# Patient Record
Sex: MALE | Race: WHITE | NOT HISPANIC OR LATINO | Employment: OTHER | URBAN - METROPOLITAN AREA
[De-identification: names, ages, dates, MRNs, and addresses within clinical notes are randomized per-mention and may not be internally consistent; named-entity substitution may affect disease eponyms.]

---

## 2017-01-04 RX ORDER — ATORVASTATIN CALCIUM 10 MG/1
TABLET, FILM COATED ORAL
Qty: 90 TABLET | Refills: 1 | Status: SHIPPED | OUTPATIENT
Start: 2017-01-04 | End: 2017-07-11 | Stop reason: SDUPTHER

## 2017-01-27 RX ORDER — NEBIVOLOL HYDROCHLORIDE 10 MG/1
TABLET ORAL
Qty: 90 TABLET | Refills: 2 | Status: SHIPPED | OUTPATIENT
Start: 2017-01-27 | End: 2017-12-20 | Stop reason: SDUPTHER

## 2017-03-08 PROBLEM — K21.9 GERD (GASTROESOPHAGEAL REFLUX DISEASE): Status: ACTIVE | Noted: 2017-03-08

## 2017-03-08 PROBLEM — I10 HYPERTENSION: Status: ACTIVE | Noted: 2017-03-08

## 2017-03-08 PROBLEM — E78.5 HYPERLIPIDEMIA: Status: ACTIVE | Noted: 2017-03-08

## 2017-03-08 PROBLEM — J45.909 ASTHMA: Status: ACTIVE | Noted: 2017-03-08

## 2017-03-08 PROBLEM — I73.9 PAD (PERIPHERAL ARTERY DISEASE): Status: ACTIVE | Noted: 2017-03-08

## 2017-03-08 PROBLEM — I25.10 CAD (CORONARY ARTERY DISEASE): Status: ACTIVE | Noted: 2017-03-08

## 2017-05-17 ENCOUNTER — OFFICE VISIT (OUTPATIENT)
Dept: CARDIOLOGY | Facility: CLINIC | Age: 72
End: 2017-05-17

## 2017-05-17 VITALS
HEIGHT: 67 IN | DIASTOLIC BLOOD PRESSURE: 82 MMHG | HEART RATE: 85 BPM | WEIGHT: 183.4 LBS | SYSTOLIC BLOOD PRESSURE: 147 MMHG | BODY MASS INDEX: 28.79 KG/M2

## 2017-05-17 DIAGNOSIS — I25.10 CORONARY ARTERY DISEASE INVOLVING NATIVE CORONARY ARTERY OF NATIVE HEART WITHOUT ANGINA PECTORIS: Primary | ICD-10-CM

## 2017-05-17 DIAGNOSIS — E78.5 DYSLIPIDEMIA: ICD-10-CM

## 2017-05-17 DIAGNOSIS — I73.9 PAD (PERIPHERAL ARTERY DISEASE) (HCC): ICD-10-CM

## 2017-05-17 DIAGNOSIS — I10 ESSENTIAL HYPERTENSION: ICD-10-CM

## 2017-05-17 PROCEDURE — 99213 OFFICE O/P EST LOW 20 MIN: CPT | Performed by: NURSE PRACTITIONER

## 2017-05-17 RX ORDER — ASPIRIN 325 MG
325 TABLET ORAL DAILY
COMMUNITY

## 2017-05-25 RX ORDER — CHLORTHALIDONE 25 MG/1
TABLET ORAL
Qty: 45 TABLET | Refills: 3 | Status: SHIPPED | OUTPATIENT
Start: 2017-05-25 | End: 2018-05-24 | Stop reason: SDUPTHER

## 2017-06-21 RX ORDER — LISINOPRIL 10 MG/1
TABLET ORAL
Qty: 90 TABLET | Refills: 2 | Status: SHIPPED | OUTPATIENT
Start: 2017-06-21 | End: 2018-02-24 | Stop reason: SDUPTHER

## 2017-07-12 RX ORDER — ATORVASTATIN CALCIUM 10 MG/1
TABLET, FILM COATED ORAL
Qty: 90 TABLET | Refills: 3 | Status: SHIPPED | OUTPATIENT
Start: 2017-07-12 | End: 2018-06-24 | Stop reason: SDUPTHER

## 2017-12-20 RX ORDER — NEBIVOLOL HYDROCHLORIDE 10 MG/1
TABLET ORAL
Qty: 90 TABLET | Refills: 2 | Status: SHIPPED | OUTPATIENT
Start: 2017-12-20 | End: 2018-09-14 | Stop reason: SDUPTHER

## 2018-01-11 ENCOUNTER — TELEPHONE (OUTPATIENT)
Dept: CARDIOLOGY | Facility: CLINIC | Age: 73
End: 2018-01-11

## 2018-01-11 NOTE — TELEPHONE ENCOUNTER
Wife called and said they think patient has a DVT, tried to call back and advise to call PCP, Dr. Blas is not in office today.  Number she left is wrong # and number on chart goes to voicemail.

## 2018-02-26 RX ORDER — LISINOPRIL 10 MG/1
TABLET ORAL
Qty: 90 TABLET | Refills: 2 | Status: SHIPPED | OUTPATIENT
Start: 2018-02-26 | End: 2018-11-05 | Stop reason: SDUPTHER

## 2018-05-21 NOTE — PROGRESS NOTES
Subjective:     Encounter Date:05/23/2018    Primary Care Physician: Simon Easley MD      Patient ID: Uzair Jacob is a 72 y.o. male.    Chief Complaint:Coronary Artery Disease and PAD (peripheral artery disease    PROBLEM LIST:  1. Coronary artery disease:  a. Catheterization, March 2012: 70% to 80% mid-LAD with ‘borderline” FFR. 3.5 x 15 Xience. EF normal.   2. Peripheral arterial disease:  a. Asymptomatic abnormal MADISON.  b. Right 0.7, left NC, April 2012.  3. Hypertension.  4. Hyperlipidemia.  5. DVT, right lower extremity.  1/18.  6. Asthma.  7. Gastroesophageal reflux disease.  8. Tonsillectomy.  9. Sinus polyp extraction.      No Known Allergies      Current Outpatient Prescriptions:   •  aspirin 325 MG tablet, Take 325 mg by mouth Daily., Disp: , Rfl:   •  atorvastatin (LIPITOR) 10 MG tablet, TAKE 1 TABLET AT BEDTIME, Disp: 90 tablet, Rfl: 3  •  BYSTOLIC 10 MG tablet, TAKE 1 TABLET BY MOUTH EVERY DAY, Disp: 90 tablet, Rfl: 2  •  chlorthalidone (HYGROTON) 25 MG tablet, TAKE 1/2 TABLET DAILY., Disp: 45 tablet, Rfl: 3  •  fluticasone-salmeterol (ADVAIR) 100-50 MCG/DOSE DISKUS, Inhale 2 (Two) Times a Day., Disp: , Rfl:   •  lisinopril (PRINIVIL,ZESTRIL) 10 MG tablet, TAKE 1 TABLET BY MOUTH EVERY DAY, Disp: 90 tablet, Rfl: 2  •  warfarin (COUMADIN) 5 MG tablet, Take 5 mg by mouth Daily. 5mg, 5mg 7,5mg cycle, Disp: , Rfl:         History of Present Illness    Patient returns today for annual follow-up of coronary disease and peripheral till disease.  Since her last visit he has no cardiovascular symptoms.  Did have a DVT of his right femoral vein diagnosed in January of this year per the patient, that occurred in the setting of a prolonged car ride.  He is currently on warfarin for this.  Symptoms of his right leg have resolved.  He still remains active physically, and denies chest pain and shortness of breath palpitations presyncope or syncope.  He has tried to self decrease his antihypertensives but  "notes that his heart starts to race and he goes back on his previous doses.    The following portions of the patient's history were reviewed and updated as appropriate: allergies, current medications, past family history, past medical history, past social history, past surgical history and problem list.    Social History   Substance Use Topics   • Smoking status: Never Smoker   • Smokeless tobacco: Never Used   • Alcohol use Yes      Comment: socially         Review of Systems   Constitution: Negative for fever, weakness and malaise/fatigue.   HENT: Negative for nosebleeds.    Eyes: Negative for redness and visual disturbance.   Cardiovascular: Positive for leg swelling. Negative for orthopnea, palpitations and paroxysmal nocturnal dyspnea.   Respiratory: Positive for cough. Negative for snoring, sputum production and wheezing.    Hematologic/Lymphatic: Negative for bleeding problem.   Skin: Negative for flushing, itching and rash.   Musculoskeletal: Negative for falls, joint pain and muscle cramps.   Gastrointestinal: Negative for abdominal pain, diarrhea, heartburn, nausea and vomiting.   Genitourinary: Negative for hematuria.   Neurological: Negative for excessive daytime sleepiness, dizziness, headaches and tremors.   Psychiatric/Behavioral: Negative for substance abuse. The patient is not nervous/anxious.           Objective:    height is 170.2 cm (67\") and weight is 83.5 kg (184 lb). His blood pressure is 126/72 and his pulse is 69.         Physical Exam   Constitutional: He is oriented to person, place, and time. He appears well-developed and well-nourished.   HENT:   Mouth/Throat: Oropharynx is clear and moist.   Neck: No JVD present. Carotid bruit is not present. No thyromegaly present.   Cardiovascular: Regular rhythm, S1 normal, S2 normal, normal heart sounds and intact distal pulses.  Exam reveals no gallop, no S3 and no S4.    No murmur heard.  Pulses:       Carotid pulses are 2+ on the right side, and " 2+ on the left side.       Radial pulses are 2+ on the right side, and 2+ on the left side.   Pulmonary/Chest: Breath sounds normal.   Abdominal: Soft. Bowel sounds are normal. He exhibits no mass. There is no tenderness.   Musculoskeletal: He exhibits no edema.   Neurological: He is alert and oriented to person, place, and time.   Skin: Skin is warm and dry. No rash noted.       Procedures          Assessment:   Assessment/Plan      Uzair was seen today for coronary artery disease and pad (peripheral artery disease.    Diagnoses and all orders for this visit:    Coronary artery disease involving native coronary artery of native heart without angina pectoris    PAD (peripheral artery disease)    Essential hypertension    Pure hypercholesterolemia      1.  Coronary artery disease stable no angina  2.  Peripheral artery disease, asymptomatic  3.  Hypertension controlled  4.  Dyslipidemia controlled  5.  DVT in the setting of prolonged car ride, currently on warfarin, being managed per primary physician.     Recommendations: Discussed the duration of warfarin which we will defer to primary care physician, as we are unclear whether he's had workup with the results of his risk for recurrence (factor V Ley[place] at all) nonetheless he is doing well from a cardiovascular standpoint, has no evidence of active ischemia or claudication and therefore continue current medical therapy.  Revisit annually or when necessary symptom change.    Vic Blas MD      Dictated utilizing Dragon dictation

## 2018-05-23 ENCOUNTER — OFFICE VISIT (OUTPATIENT)
Dept: CARDIOLOGY | Facility: CLINIC | Age: 73
End: 2018-05-23

## 2018-05-23 VITALS
DIASTOLIC BLOOD PRESSURE: 72 MMHG | SYSTOLIC BLOOD PRESSURE: 126 MMHG | WEIGHT: 184 LBS | HEIGHT: 67 IN | HEART RATE: 69 BPM | BODY MASS INDEX: 28.88 KG/M2

## 2018-05-23 DIAGNOSIS — E78.00 PURE HYPERCHOLESTEROLEMIA: ICD-10-CM

## 2018-05-23 DIAGNOSIS — I25.10 CORONARY ARTERY DISEASE INVOLVING NATIVE CORONARY ARTERY OF NATIVE HEART WITHOUT ANGINA PECTORIS: Primary | ICD-10-CM

## 2018-05-23 DIAGNOSIS — I10 ESSENTIAL HYPERTENSION: ICD-10-CM

## 2018-05-23 DIAGNOSIS — I73.9 PAD (PERIPHERAL ARTERY DISEASE) (HCC): ICD-10-CM

## 2018-05-23 PROCEDURE — 99214 OFFICE O/P EST MOD 30 MIN: CPT | Performed by: INTERNAL MEDICINE

## 2018-05-23 RX ORDER — WARFARIN SODIUM 5 MG/1
5 TABLET ORAL
COMMUNITY
End: 2019-05-01

## 2018-05-24 RX ORDER — CHLORTHALIDONE 25 MG/1
TABLET ORAL
Qty: 45 TABLET | Refills: 3 | Status: SHIPPED | OUTPATIENT
Start: 2018-05-24 | End: 2019-04-28 | Stop reason: SDUPTHER

## 2018-06-25 RX ORDER — ATORVASTATIN CALCIUM 10 MG/1
TABLET, FILM COATED ORAL
Qty: 90 TABLET | Refills: 3 | Status: SHIPPED | OUTPATIENT
Start: 2018-06-25 | End: 2018-12-17 | Stop reason: SDUPTHER

## 2018-09-17 RX ORDER — NEBIVOLOL HYDROCHLORIDE 10 MG/1
TABLET ORAL
Qty: 90 TABLET | Refills: 2 | Status: SHIPPED | OUTPATIENT
Start: 2018-09-17 | End: 2018-11-05 | Stop reason: SDUPTHER

## 2018-11-05 RX ORDER — LISINOPRIL 10 MG/1
10 TABLET ORAL DAILY
Qty: 90 TABLET | Refills: 3 | Status: SHIPPED | OUTPATIENT
Start: 2018-11-05 | End: 2019-10-16 | Stop reason: SDUPTHER

## 2018-11-05 RX ORDER — NEBIVOLOL 10 MG/1
10 TABLET ORAL DAILY
Qty: 90 TABLET | Refills: 3 | Status: SHIPPED | OUTPATIENT
Start: 2018-11-05 | End: 2019-12-06 | Stop reason: SDUPTHER

## 2018-12-17 RX ORDER — ATORVASTATIN CALCIUM 10 MG/1
10 TABLET, FILM COATED ORAL
Qty: 30 TABLET | Refills: 3 | Status: SHIPPED | OUTPATIENT
Start: 2018-12-17 | End: 2019-05-01 | Stop reason: SDUPTHER

## 2019-03-04 DIAGNOSIS — Z12.11 SCREENING FOR COLON CANCER: Primary | ICD-10-CM

## 2019-03-12 ENCOUNTER — OUTSIDE FACILITY SERVICE (OUTPATIENT)
Dept: GASTROENTEROLOGY | Facility: CLINIC | Age: 74
End: 2019-03-12

## 2019-03-12 ENCOUNTER — LAB REQUISITION (OUTPATIENT)
Dept: LAB | Facility: HOSPITAL | Age: 74
End: 2019-03-12

## 2019-03-12 DIAGNOSIS — Z12.11 ENCOUNTER FOR SCREENING FOR MALIGNANT NEOPLASM OF COLON: ICD-10-CM

## 2019-03-12 DIAGNOSIS — Z86.010 HISTORY OF COLONIC POLYPS: ICD-10-CM

## 2019-03-12 PROCEDURE — 45385 COLONOSCOPY W/LESION REMOVAL: CPT | Performed by: INTERNAL MEDICINE

## 2019-03-12 PROCEDURE — 88305 TISSUE EXAM BY PATHOLOGIST: CPT | Performed by: INTERNAL MEDICINE

## 2019-03-12 PROCEDURE — G0500 MOD SEDAT ENDO SERVICE >5YRS: HCPCS | Performed by: INTERNAL MEDICINE

## 2019-03-13 LAB
CYTO UR: NORMAL
LAB AP CASE REPORT: NORMAL
LAB AP CLINICAL INFORMATION: NORMAL
PATH REPORT.FINAL DX SPEC: NORMAL
PATH REPORT.GROSS SPEC: NORMAL

## 2019-03-26 RX ORDER — ATORVASTATIN CALCIUM 10 MG/1
10 TABLET, FILM COATED ORAL
Qty: 30 TABLET | Refills: 1 | OUTPATIENT
Start: 2019-03-26

## 2019-04-18 PROBLEM — J44.89 CHRONIC ASTHMATIC BRONCHITIS: Status: ACTIVE | Noted: 2019-04-18

## 2019-04-18 PROBLEM — I82.401 DEEP VEIN THROMBOSIS (DVT) OF RIGHT LOWER EXTREMITY: Status: ACTIVE | Noted: 2019-04-18

## 2019-04-18 PROBLEM — J44.9 CHRONIC ASTHMATIC BRONCHITIS (HCC): Status: ACTIVE | Noted: 2019-04-18

## 2019-04-18 PROBLEM — Z78.9 ALCOHOL USE: Status: ACTIVE | Noted: 2019-04-18

## 2019-04-18 PROBLEM — E78.00 HYPERCHOLESTEROLEMIA: Status: ACTIVE | Noted: 2019-04-18

## 2019-04-18 PROBLEM — R60.0 EDEMA OF RIGHT LOWER EXTREMITY: Status: ACTIVE | Noted: 2019-04-18

## 2019-04-18 PROBLEM — Z00.00 ANNUAL PHYSICAL EXAM: Status: ACTIVE | Noted: 2019-04-18

## 2019-04-18 PROBLEM — R00.1 SINUS BRADYCARDIA, PERSISTENT: Status: ACTIVE | Noted: 2019-04-18

## 2019-04-18 PROBLEM — I20.9 ANGINA PECTORIS: Status: ACTIVE | Noted: 2019-04-18

## 2019-04-18 PROBLEM — J45.909 ACUTE ASTHMATIC BRONCHITIS: Status: ACTIVE | Noted: 2019-04-18

## 2019-04-18 PROBLEM — J30.2 SEASONAL ALLERGIC RHINITIS: Status: ACTIVE | Noted: 2019-04-18

## 2019-04-18 PROBLEM — I82.411 ACUTE DEEP VEIN THROMBOSIS (DVT) OF FEMORAL VEIN OF RIGHT LOWER EXTREMITY (HCC): Status: ACTIVE | Noted: 2019-04-18

## 2019-04-18 PROBLEM — I73.9 PERIPHERAL VASCULAR DISEASE: Status: ACTIVE | Noted: 2019-04-18

## 2019-04-18 PROBLEM — Z01.818 PREOPERATIVE EXAMINATION: Status: ACTIVE | Noted: 2019-04-18

## 2019-04-22 RX ORDER — ATORVASTATIN CALCIUM 10 MG/1
10 TABLET, FILM COATED ORAL
Qty: 30 TABLET | Refills: 3 | OUTPATIENT
Start: 2019-04-22

## 2019-04-22 RX ORDER — ALBUTEROL SULFATE 90 UG/1
2 AEROSOL, METERED RESPIRATORY (INHALATION) EVERY 4 HOURS PRN
Qty: 1 INHALER | Refills: 5 | Status: SHIPPED | OUTPATIENT
Start: 2019-04-22 | End: 2019-12-09 | Stop reason: SDUPTHER

## 2019-04-22 RX ORDER — ALBUTEROL SULFATE 90 UG/1
2 AEROSOL, METERED RESPIRATORY (INHALATION) EVERY 4 HOURS PRN
COMMUNITY
End: 2019-04-22 | Stop reason: SDUPTHER

## 2019-04-29 RX ORDER — CHLORTHALIDONE 25 MG/1
TABLET ORAL
Qty: 45 TABLET | Refills: 3 | Status: SHIPPED | OUTPATIENT
Start: 2019-04-29 | End: 2020-03-03

## 2019-05-01 ENCOUNTER — OFFICE VISIT (OUTPATIENT)
Dept: INTERNAL MEDICINE | Facility: CLINIC | Age: 74
End: 2019-05-01

## 2019-05-01 ENCOUNTER — LAB REQUISITION (OUTPATIENT)
Dept: LAB | Facility: HOSPITAL | Age: 74
End: 2019-05-01

## 2019-05-01 VITALS
SYSTOLIC BLOOD PRESSURE: 122 MMHG | HEIGHT: 67 IN | HEART RATE: 88 BPM | WEIGHT: 177 LBS | DIASTOLIC BLOOD PRESSURE: 66 MMHG | BODY MASS INDEX: 27.78 KG/M2

## 2019-05-01 DIAGNOSIS — I87.009 POSTPHLEBITIC SYNDROME: ICD-10-CM

## 2019-05-01 DIAGNOSIS — J45.40 MODERATE PERSISTENT ASTHMA WITHOUT COMPLICATION: ICD-10-CM

## 2019-05-01 DIAGNOSIS — I10 ESSENTIAL HYPERTENSION: ICD-10-CM

## 2019-05-01 DIAGNOSIS — Z00.00 ROUTINE GENERAL MEDICAL EXAMINATION AT A HEALTH CARE FACILITY: ICD-10-CM

## 2019-05-01 DIAGNOSIS — E78.2 MIXED HYPERLIPIDEMIA: Primary | ICD-10-CM

## 2019-05-01 DIAGNOSIS — I25.10 CORONARY ARTERY DISEASE INVOLVING NATIVE CORONARY ARTERY OF NATIVE HEART WITHOUT ANGINA PECTORIS: ICD-10-CM

## 2019-05-01 DIAGNOSIS — J30.89 SEASONAL ALLERGIC RHINITIS DUE TO OTHER ALLERGIC TRIGGER: ICD-10-CM

## 2019-05-01 PROBLEM — I82.401 DEEP VEIN THROMBOSIS (DVT) OF RIGHT LOWER EXTREMITY: Status: RESOLVED | Noted: 2019-04-18 | Resolved: 2019-05-01

## 2019-05-01 PROCEDURE — 36415 COLL VENOUS BLD VENIPUNCTURE: CPT

## 2019-05-01 PROCEDURE — 99214 OFFICE O/P EST MOD 30 MIN: CPT | Performed by: INTERNAL MEDICINE

## 2019-05-01 RX ORDER — OMEPRAZOLE 20 MG/1
20 CAPSULE, DELAYED RELEASE ORAL DAILY
COMMUNITY

## 2019-05-01 RX ORDER — PREDNISONE 10 MG/1
10 TABLET ORAL DAILY
Qty: 14 TABLET | Refills: 0 | Status: SHIPPED | OUTPATIENT
Start: 2019-05-01 | End: 2019-05-29

## 2019-05-01 RX ORDER — FEXOFENADINE HCL 180 MG/1
180 TABLET ORAL DAILY PRN
COMMUNITY
End: 2021-05-12

## 2019-05-01 RX ORDER — TRIAMCINOLONE ACETONIDE 55 UG/1
2 SPRAY, METERED NASAL 2 TIMES DAILY
COMMUNITY

## 2019-05-01 NOTE — PROGRESS NOTES
[facility]     Uzair Jacob  1945   [id number]      Patient Care Team:  Simon Easley MD as PCP - General    Chief Complaint::   Chief Complaint   Patient presents with   • Allergies     productive green cough   • Hypertension     He is fasting   • Hyperlipidemia        HPI  Mr. Jacob is now 73.  He comes in for follow-up of his hypertension, hyperlipidemia, allergy/asthma, and coronary artery disease.  He sees cardiology annually for his heart disease.  His only complaint today is increasing allergy symptoms with postnasal drainage congestion and wheezing.  There is been no chest pain he wears his compression stockings faithfully, now taking 325 mg aspirin daily since being off warfarin for DVT.    Chronic Conditions:      Patient Active Problem List   Diagnosis   • CAD (coronary artery disease)   • PAD (peripheral artery disease) (CMS/[facility])   • Essential hypertension   • Mixed hyperlipidemia   • Asthma   • GERD (gastroesophageal reflux disease)   • Angina pectoris (CMS/[facility])   • Chronic asthmatic bronchitis (CMS/[facility])   • Peripheral vascular disease (CMS/[facility])   • Seasonal allergic rhinitis   • Edema of right lower extremity   • Annual physical exam   • Alcohol use   • Preoperative examination   • Acute asthmatic bronchitis   • Sinus bradycardia, persistent   • Postphlebitic syndrome        Past Medical History:   Diagnosis Date   • Asthma 3/8/2017   • CAD (coronary artery disease) 3/8/2017   • Chest pain     stent to LAD 2012   • Deep vein thrombosis (DVT) of right lower extremity (CMS/HCC) 4/18/2019   • GERD (gastroesophageal reflux disease) 3/8/2017   • Hyperlipidemia 3/8/2017   • Hypertension 3/8/2017   • Nasal polyps     nasal polypectomy 1996   • PAD (peripheral artery disease) (CMS/HCC) 3/8/2017       Past Surgical History:   Procedure Laterality Date   • CORONARY ANGIOPLASTY WITH STENT PLACEMENT  03/2012    Chest pain; stent to LAD   • NASAL POLYP SURGERY  1996    Nasal  polyps   • POLYPECTOMY      Sinus polyp extraction.   • TONSILLECTOMY         Family History   Problem Relation Age of Onset   • Esophageal cancer Father         cause of death       Social History     Socioeconomic History   • Marital status:      Spouse name: Not on file   • Number of children: Not on file   • Years of education: Not on file   • Highest education level: Not on file   Tobacco Use   • Smoking status: Never Smoker   • Smokeless tobacco: Never Used   Substance and Sexual Activity   • Alcohol use: Yes     Comment: socially   • Drug use: No   • Sexual activity: Defer       No Known Allergies      Current Outpatient Medications:   •  albuterol sulfate  (90 Base) MCG/ACT inhaler, Inhale 2 puffs Every 4 (Four) Hours As Needed (inhale 2 puffs by inhalation route every 4-6 hours as needed)., Disp: 1 inhaler, Rfl: 5  •  aspirin 325 MG tablet, Take 325 mg by mouth Daily., Disp: , Rfl:   •  atorvastatin (LIPITOR) 10 MG tablet, Take 1 tablet by mouth every night at bedtime. Please defer to PCP or have lipid panel checked, Disp: 30 tablet, Rfl: 3  •  chlorthalidone (HYGROTON) 25 MG tablet, TAKE 1/2 TABLET DAILY., Disp: 45 tablet, Rfl: 3  •  fexofenadine (ALLEGRA) 180 MG tablet, Take 180 mg by mouth Daily., Disp: , Rfl:   •  fluticasone-salmeterol (ADVAIR) 100-50 MCG/DOSE DISKUS, Inhale 2 puffs 2 (Two) Times a Day., Disp: , Rfl:   •  lisinopril (PRINIVIL,ZESTRIL) 10 MG tablet, Take 1 tablet by mouth Daily., Disp: 90 tablet, Rfl: 3  •  nebivolol (BYSTOLIC) 10 MG tablet, Take 1 tablet by mouth Daily., Disp: 90 tablet, Rfl: 3  •  omeprazole (priLOSEC) 20 MG capsule, Take 20 mg by mouth Daily., Disp: , Rfl:   •  Triamcinolone Acetonide (NASACORT) 55 MCG/ACT nasal inhaler, 2 sprays into the nostril(s) as directed by provider 2 (Two) Times a Day., Disp: , Rfl:   •  predniSONE (DELTASONE) 10 MG tablet, Take 1 tablet by mouth Daily., Disp: 14 tablet, Rfl: 0    Review of Systems   Constitutional: Negative for  "chills, fatigue and fever.   HENT: Positive for congestion and sneezing. Negative for sinus pressure and sore throat.    Respiratory: Positive for cough, shortness of breath and wheezing. Negative for chest tightness.    Cardiovascular: Negative for chest pain and palpitations.   Gastrointestinal: Negative for abdominal pain, blood in stool and constipation.   Skin: Negative for color change.   Allergic/Immunologic: Negative for environmental allergies.   Neurological: Negative for dizziness, speech difficulty and headache.   Psychiatric/Behavioral: Negative for decreased concentration. The patient is not nervous/anxious.         Vital Signs  Vitals:    05/01/19 0941   BP: 122/66   BP Location: Left arm   Patient Position: Sitting   Cuff Size: Adult   Pulse: 88   Weight: 80.3 kg (177 lb)   Height: 170.2 cm (67\")       Physical Exam   Constitutional: He is oriented to person, place, and time. He appears well-developed and well-nourished.   HENT:   Head: Normocephalic and atraumatic.   Right Ear: External ear normal.   Left Ear: External ear normal.   Mouth/Throat: Posterior oropharyngeal erythema present. No oropharyngeal exudate.   Cardiovascular: Normal rate, regular rhythm and normal heart sounds.   No murmur heard.  Pulmonary/Chest: Effort normal. He has wheezes in the right upper field, the right middle field, the right lower field, the left upper field, the left middle field and the left lower field.   Musculoskeletal:   No edema, wearing compression.   Neurological: He is alert and oriented to person, place, and time.   Psychiatric: He has a normal mood and affect.   Vitals reviewed.     Procedures    ACE III MINI             Assessment/Plan:    Uzair was seen today for allergies, hypertension and hyperlipidemia.    Diagnoses and all orders for this visit:    Mixed hyperlipidemia  -     Lipid Panel; Future  -     Comprehensive Metabolic Panel; Future  -     Comprehensive Metabolic Panel  -     Lipid " Panel    Coronary artery disease involving native coronary artery of native heart without angina pectoris    Essential hypertension  -     Comprehensive Metabolic Panel; Future  -     Comprehensive Metabolic Panel    Seasonal allergic rhinitis due to other allergic trigger    Moderate persistent asthma without complication    Postphlebitic syndrome    Other orders  -     predniSONE (DELTASONE) 10 MG tablet; Take 1 tablet by mouth Daily.    Lipid panel pending, continue atorvastatin, regular physical exercise and healthy diet.    Coronary artery disease is asymptomatic, continue follow-up with cardiology.    Blood pressure well controlled on Bystolic and lisinopril.    Allergies and asthma flare due to the season.  He is given a short course of oral prednisone 10 mg a day for 2 weeks.    Postphlebitic syndrome right lower extremity, well managed with compression and aspirin.        Plan of care reviewed with patient at the conclusion of today's visit. Education was provided regarding diagnosis, management, and any prescribed or recommended OTC medications.Patient verbalizes understanding of and agreement with management plan.         Simon Easley MD

## 2019-05-02 LAB
ALBUMIN SERPL-MCNC: 4 G/DL (ref 3.5–5.2)
ALBUMIN/GLOB SERPL: 1.4 G/DL
ALP SERPL-CCNC: 67 U/L (ref 39–117)
ALT SERPL-CCNC: 15 U/L (ref 1–41)
AST SERPL-CCNC: 15 U/L (ref 1–40)
BILIRUB SERPL-MCNC: 1.2 MG/DL (ref 0.2–1.2)
BUN SERPL-MCNC: 15 MG/DL (ref 8–23)
BUN/CREAT SERPL: 14 (ref 7–25)
CALCIUM SERPL-MCNC: 9.6 MG/DL (ref 8.6–10.5)
CHLORIDE SERPL-SCNC: 99 MMOL/L (ref 98–107)
CHOLEST SERPL-MCNC: 153 MG/DL (ref 0–200)
CO2 SERPL-SCNC: 29.8 MMOL/L (ref 22–29)
CREAT SERPL-MCNC: 1.07 MG/DL (ref 0.76–1.27)
GLOBULIN SER CALC-MCNC: 2.9 GM/DL
GLUCOSE SERPL-MCNC: 101 MG/DL (ref 65–99)
HDLC SERPL-MCNC: 62 MG/DL (ref 40–60)
LDLC SERPL CALC-MCNC: 78 MG/DL (ref 0–100)
POTASSIUM SERPL-SCNC: 3.8 MMOL/L (ref 3.5–5.2)
PROT SERPL-MCNC: 6.9 G/DL (ref 6–8.5)
SODIUM SERPL-SCNC: 140 MMOL/L (ref 136–145)
TRIGL SERPL-MCNC: 63 MG/DL (ref 0–150)
VLDLC SERPL CALC-MCNC: 12.6 MG/DL

## 2019-05-02 RX ORDER — ATORVASTATIN CALCIUM 10 MG/1
10 TABLET, FILM COATED ORAL
Qty: 90 TABLET | Refills: 3 | Status: SHIPPED | OUTPATIENT
Start: 2019-05-02 | End: 2019-11-06

## 2019-05-28 NOTE — PROGRESS NOTES
Subjective:     Encounter Date:05/29/2019    Primary Care Physician: Simon Easley MD      Patient ID: Uzair Jacob is a 73 y.o. male.    Chief Complaint:Follow-up    PROBLEM LIST:  1. Coronary artery disease:  a. Catheterization, March 2012: 70% to 80% mid-LAD with ‘borderline” FFR. 3.5 x 15 Xience. EF normal.   2. Peripheral arterial disease:  a. Asymptomatic abnormal MADISON.  b. Right 0.7, left NC, April 2012.  3. Hypertension.  4. Hyperlipidemia.  5. DVT, right lower extremity.  1/18.  6. Asthma.  7. Gastroesophageal reflux disease.  8. Tonsillectomy.  9. Sinus polyp extraction.      No Known Allergies      Current Outpatient Medications:   •  albuterol sulfate  (90 Base) MCG/ACT inhaler, Inhale 2 puffs Every 4 (Four) Hours As Needed (inhale 2 puffs by inhalation route every 4-6 hours as needed)., Disp: 1 inhaler, Rfl: 5  •  aspirin 325 MG tablet, Take 325 mg by mouth Daily., Disp: , Rfl:   •  atorvastatin (LIPITOR) 10 MG tablet, TAKE 1 TABLET BY MOUTH EVERY NIGHT AT BEDTIME. PLEASE DEFER TO PCP OR HAVE LIPID PANEL CHECKED, Disp: 90 tablet, Rfl: 3  •  chlorthalidone (HYGROTON) 25 MG tablet, TAKE 1/2 TABLET DAILY., Disp: 45 tablet, Rfl: 3  •  fexofenadine (ALLEGRA) 180 MG tablet, Take 180 mg by mouth Daily., Disp: , Rfl:   •  fluticasone-salmeterol (ADVAIR) 100-50 MCG/DOSE DISKUS, Inhale 2 puffs 2 (Two) Times a Day., Disp: , Rfl:   •  lisinopril (PRINIVIL,ZESTRIL) 10 MG tablet, Take 1 tablet by mouth Daily., Disp: 90 tablet, Rfl: 3  •  nebivolol (BYSTOLIC) 10 MG tablet, Take 1 tablet by mouth Daily., Disp: 90 tablet, Rfl: 3  •  omeprazole (priLOSEC) 20 MG capsule, Take 20 mg by mouth Daily., Disp: , Rfl:   •  Triamcinolone Acetonide (NASACORT) 55 MCG/ACT nasal inhaler, 2 sprays into the nostril(s) as directed by provider 2 (Two) Times a Day., Disp: , Rfl:         History of Present Illness    Patient is a 73-year-old  male who we are seeing today for annual follow-up of coronary disease  "and peripheral arterial disease.  Since last being seen he notes to overall be doing well.  He is now off of anticoagulant therapy for DVT.  He denies any chest pain, pressure, tightness.  He denies any increasing shortness of breath.  No syncope, near-syncope, or edema.  Notes that he is compliant with his medications and feels well.  Had recent lab work done with LDL of 78.  His primary complaint is of seasonal allergies.    The following portions of the patient's history were reviewed and updated as appropriate: allergies, current medications, past family history, past medical history, past social history, past surgical history and problem list.      Social History     Tobacco Use   • Smoking status: Never Smoker   • Smokeless tobacco: Never Used   Substance Use Topics   • Alcohol use: Yes     Comment: socially   • Drug use: No         Review of Systems   Constitution: Negative.   Cardiovascular: Negative.    Respiratory: Positive for cough, sputum production and wheezing.    Hematologic/Lymphatic: Negative for bleeding problem. Does not bruise/bleed easily.   Skin: Negative for rash.   Musculoskeletal: Negative for muscle weakness and myalgias.   Gastrointestinal: Negative for heartburn, nausea and vomiting.   Neurological: Negative.    Allergic/Immunologic: Positive for environmental allergies.          Objective:    height is 170.2 cm (67\") and weight is 81.6 kg (180 lb). His blood pressure is 136/72 and his pulse is 70. His oxygen saturation is 94%.         Physical Exam   Constitutional: He is oriented to person, place, and time. He appears well-developed and well-nourished. No distress.   Neck: No JVD present. No tracheal deviation present.   Cardiovascular: Normal rate, regular rhythm, normal heart sounds and intact distal pulses. Exam reveals no friction rub.   No murmur heard.  Pulmonary/Chest: Effort normal. No respiratory distress.   Mild expiratory wheeze   Abdominal: Soft. Bowel sounds are normal. " There is no tenderness.   Musculoskeletal: He exhibits no edema or deformity.   Neurological: He is alert and oriented to person, place, and time.   Skin: Skin is warm and dry.       Procedures          Assessment:   Assessment/Plan      Uzair was seen today for follow-up.    Diagnoses and all orders for this visit:    Coronary artery disease involving native coronary artery of native heart without angina pectoris.  Stable.    PAD (peripheral artery disease) (CMS/HCC), asymptomatic.    Essential hypertension, controlled.    Mixed hyperlipidemia, controlled on statin therapy.      Plan:  1. Continue aspirin and statin for history of coronary disease as well as beta-blocker.  2. Continue statin for dyslipidemia  3. Continue beta-blocker and ACE inhibitor as well as chlorthalidone for hypertension.  4. Continue statin as well for peripheral chill disease.  5. Discussed with patient possibility of decreasing aspirin therapy to 81 mg daily.  However, given his noted history of DVT and that he has been on this dose for quite some time he wishes to remain on 325 mg.  For now we will continue his 325 mg dose.  Discussed with patient should he begin to experience any issues such as excessive bruising he may decrease this to 81 mg.  6. Follow-up in 1 years time or sooner if symptoms change.       CATERINA Gaitan     Dictated utilizing Dragon dictation

## 2019-05-29 ENCOUNTER — OFFICE VISIT (OUTPATIENT)
Dept: CARDIOLOGY | Facility: CLINIC | Age: 74
End: 2019-05-29

## 2019-05-29 VITALS
WEIGHT: 180 LBS | HEIGHT: 67 IN | DIASTOLIC BLOOD PRESSURE: 72 MMHG | OXYGEN SATURATION: 94 % | SYSTOLIC BLOOD PRESSURE: 136 MMHG | HEART RATE: 70 BPM | BODY MASS INDEX: 28.25 KG/M2

## 2019-05-29 DIAGNOSIS — I25.10 CORONARY ARTERY DISEASE INVOLVING NATIVE CORONARY ARTERY OF NATIVE HEART WITHOUT ANGINA PECTORIS: Primary | ICD-10-CM

## 2019-05-29 DIAGNOSIS — I10 ESSENTIAL HYPERTENSION: ICD-10-CM

## 2019-05-29 DIAGNOSIS — I73.9 PAD (PERIPHERAL ARTERY DISEASE) (HCC): ICD-10-CM

## 2019-05-29 DIAGNOSIS — E78.2 MIXED HYPERLIPIDEMIA: ICD-10-CM

## 2019-05-29 PROCEDURE — 99213 OFFICE O/P EST LOW 20 MIN: CPT | Performed by: NURSE PRACTITIONER

## 2019-05-30 DIAGNOSIS — J45.20 MILD INTERMITTENT ASTHMA, UNSPECIFIED WHETHER COMPLICATED: Primary | ICD-10-CM

## 2019-08-22 RX ORDER — FLUTICASONE PROPIONATE AND SALMETEROL XINAFOATE 115; 21 UG/1; UG/1
AEROSOL, METERED RESPIRATORY (INHALATION)
Qty: 36 INHALER | Refills: 3 | Status: SHIPPED | OUTPATIENT
Start: 2019-08-22 | End: 2019-11-06

## 2019-10-16 RX ORDER — LISINOPRIL 10 MG/1
TABLET ORAL
Qty: 90 TABLET | Refills: 3 | Status: SHIPPED | OUTPATIENT
Start: 2019-10-16 | End: 2020-10-02

## 2019-10-25 ENCOUNTER — TELEPHONE (OUTPATIENT)
Dept: CARDIOLOGY | Facility: CLINIC | Age: 74
End: 2019-10-25

## 2019-10-25 NOTE — TELEPHONE ENCOUNTER
Cardiac rehab called to report patient is having increasing shortness of breath with rehab.  His sats are dropping as well.  Has an uncoming appt with PCP on 11/1/19.  Advised will check with TM for furher recommendations if necessary.

## 2019-11-01 ENCOUNTER — OFFICE VISIT (OUTPATIENT)
Dept: INTERNAL MEDICINE | Facility: CLINIC | Age: 74
End: 2019-11-01

## 2019-11-01 ENCOUNTER — LAB REQUISITION (OUTPATIENT)
Dept: LAB | Facility: HOSPITAL | Age: 74
End: 2019-11-01

## 2019-11-01 VITALS
DIASTOLIC BLOOD PRESSURE: 68 MMHG | HEART RATE: 80 BPM | BODY MASS INDEX: 27.47 KG/M2 | WEIGHT: 175 LBS | SYSTOLIC BLOOD PRESSURE: 126 MMHG | HEIGHT: 67 IN

## 2019-11-01 DIAGNOSIS — E78.2 MIXED HYPERLIPIDEMIA: ICD-10-CM

## 2019-11-01 DIAGNOSIS — I10 ESSENTIAL HYPERTENSION: ICD-10-CM

## 2019-11-01 DIAGNOSIS — Z12.5 SCREENING PSA (PROSTATE SPECIFIC ANTIGEN): ICD-10-CM

## 2019-11-01 DIAGNOSIS — K21.9 GASTROESOPHAGEAL REFLUX DISEASE WITHOUT ESOPHAGITIS: ICD-10-CM

## 2019-11-01 DIAGNOSIS — Z00.00 WELL ADULT EXAM: Primary | ICD-10-CM

## 2019-11-01 DIAGNOSIS — Z00.00 ROUTINE GENERAL MEDICAL EXAMINATION AT A HEALTH CARE FACILITY: ICD-10-CM

## 2019-11-01 PROCEDURE — G0439 PPPS, SUBSEQ VISIT: HCPCS | Performed by: PHYSICIAN ASSISTANT

## 2019-11-01 PROCEDURE — 36415 COLL VENOUS BLD VENIPUNCTURE: CPT | Performed by: PHYSICIAN ASSISTANT

## 2019-11-01 NOTE — PATIENT INSTRUCTIONS
Medicare Wellness  Personal Prevention Plan of Service     Date of Office Visit:  2019  Encounter Provider:  Iris Gilmore PA-C  Place of Service:  [facility]  Patient Name: Uzair Jacob  :  1945    As part of the Medicare Wellness portion of your visit today, we are providing you with this personalized preventive plan of services (PPPS). This plan is based upon recommendations of the [place] Preventive Services Task Force (USPSTF) and the Advisory Committee on Immunization Practices (ACIP).    This lists the preventive care services that should be considered, and provides dates of when you are due. Items listed as completed are up-to-date and do not require any further intervention.    Health Maintenance   Topic Date Due   • ZOSTER VACCINE (2 of 3) 08/15/2012   • HEPATITIS C SCREENING  2017   • INFLUENZA VACCINE  2019   • MEDICARE ANNUAL WELLNESS  10/01/2019   • LIPID PANEL  2020   • TDAP/TD VACCINES (2 - Td) 2023   • COLONOSCOPY  2029   • PNEUMOCOCCAL VACCINES (65+ LOW/MEDIUM RISK)  Completed       Orders Placed This Encounter   Procedures   • Comprehensive Metabolic Panel     Standing Status:   Future     Standing Expiration Date:   2020   • Lipid Panel     Standing Status:   Future     Standing Expiration Date:   2020   • MicroAlbumin, Urine, Random - Urine, Clean Catch     Standing Status:   Future     Standing Expiration Date:   2020   • PSA Screen     Standing Status:   Future     Standing Expiration Date:   2020   • CBC & Differential     Standing Status:   Future     Standing Expiration Date:   2020     Order Specific Question:   Manual Differential     Answer:   No       Return for Next scheduled follow up, labs today.

## 2019-11-01 NOTE — PROGRESS NOTES
Subsequent Medicare Wellness Visit   The ABC's of the Annual Wellness Visit    Chief Complaint   Patient presents with   • Medicare Wellness-subsequent     he is fasting       HPI:  Uzair Jacob, -1945, is a 74 y.o. male who presents for a Subsequent Medicare Wellness Visit.    Recent Hospitalizations:  No hospitalization(s) within the last year..    Current Medical Providers:  Patient Care Team:  Simon Easley MD as PCP - General  Vic Blas MD as Consulting Physician (Cardiology)    Health Habits and Functional and Cognitive Screening and Depression Screening:  Functional & Cognitive Status 2019   Do you have difficulty preparing food and eating? No   Do you have difficulty bathing yourself, getting dressed or grooming yourself? No   Do you have difficulty using the toilet? No   Do you have difficulty moving around from place to place? No   Do you have trouble with steps or getting out of a bed or a chair? No   Current Diet Well Balanced Diet   Dental Exam Up to date   Eye Exam Up to date   Exercise (times per week) 3 times per week   Do you need help using the phone?  No   Are you deaf or do you have serious difficulty hearing?  No   Do you need help with transportation? No   Do you need help shopping? No   Do you need help preparing meals?  No   Do you need help with housework?  No   Do you need help with laundry? No   Do you need help taking your medications? No   Do you need help managing money? No   Do you ever drive or ride in a car without wearing a seat belt? No   Have you felt unusual stress, anger or loneliness in the last month? No   Who do you live with? Spouse   If you need help, do you have trouble finding someone available to you? No   Have you been bothered in the last four weeks by sexual problems? No   Do you have difficulty concentrating, remembering or making decisions? No       Compared to one year ago, the patient feels his physical health is the same and his  mental health is the same.    Depression Screen:  PHQ-2/PHQ-9 Depression Screening 2019   Little interest or pleasure in doing things 0   Feeling down, depressed, or hopeless 0   Total Score 0       STEADI Fall Risk Assessment was completed, and patient is at LOW risk for falls.Assessment completed on:2019    ATTENTION  What is the year: correct  What is the month of the year: correct  What is the day of the week?: correct  What is the date?: correct  MEMORY  Repeat address three times, only score third attempt: [address], [place]: 7  HOW MANY ANIMALS DID THE PATIENT NAME  Verbal Fluency -- Animal Names (0-25): 22+  CLOCK DRAWING  Clock Drawing: All Correct  MEMORY RECALL  Tell me what you remember about that name and address we were repeating at the beginnin  ACE TOTAL SCORE  Total ACE Score - <25/30 strongly suggests cognitive impairment; <21/30 almost certainly shows dementia: 27    Past Medical/Family/Social History:  The following portions of the patient's history were reviewed and updated as appropriate: allergies, current medications, past family history, past medical history, past social history and past surgical history.    No Known Allergies      Current Outpatient Medications:   •  ADVAIR -21 MCG/ACT inhaler, INHALE 2 PUFFS TWICE A DAY IN MORNING AND EVENING, Disp: 36 inhaler, Rfl: 3  •  albuterol sulfate  (90 Base) MCG/ACT inhaler, Inhale 2 puffs Every 4 (Four) Hours As Needed (inhale 2 puffs by inhalation route every 4-6 hours as needed)., Disp: 1 inhaler, Rfl: 5  •  aspirin 325 MG tablet, Take 325 mg by mouth Daily., Disp: , Rfl:   •  atorvastatin (LIPITOR) 10 MG tablet, TAKE 1 TABLET BY MOUTH EVERY NIGHT AT BEDTIME. PLEASE DEFER TO PCP OR HAVE LIPID PANEL CHECKED, Disp: 90 tablet, Rfl: 3  •  chlorthalidone (HYGROTON) 25 MG tablet, TAKE 1/2 TABLET DAILY., Disp: 45 tablet, Rfl: 3  •  fexofenadine (ALLEGRA) 180 MG tablet, Take 180 mg by mouth  Daily., Disp: , Rfl:   •  lisinopril (PRINIVIL,ZESTRIL) 10 MG tablet, TAKE 1 TABLET BY MOUTH EVERY DAY, Disp: 90 tablet, Rfl: 3  •  nebivolol (BYSTOLIC) 10 MG tablet, Take 1 tablet by mouth Daily., Disp: 90 tablet, Rfl: 3  •  omeprazole (priLOSEC) 20 MG capsule, Take 20 mg by mouth Daily., Disp: , Rfl:   •  Triamcinolone Acetonide (NASACORT) 55 MCG/ACT nasal inhaler, 2 sprays into the nostril(s) as directed by provider 2 (Two) Times a Day., Disp: , Rfl:     Aspirin use counseling: Taking ASA appropriately as indicated    Current medication list contains no high risk medications.  No harmful drug interactions have been identified.     Family History   Problem Relation Age of Onset   • Esophageal cancer Father         cause of death       Social History     Tobacco Use   • Smoking status: Never Smoker   • Smokeless tobacco: Never Used   Substance Use Topics   • Alcohol use: Yes     Frequency: Monthly or less     Drinks per session: 1 or 2     Binge frequency: Never     Comment: socially       Past Surgical History:   Procedure Laterality Date   • CORONARY ANGIOPLASTY WITH STENT PLACEMENT  03/2012    Chest pain; stent to LAD   • NASAL POLYP SURGERY  1996    Nasal polyps   • POLYPECTOMY      Sinus polyp extraction.   • TONSILLECTOMY         Patient Active Problem List   Diagnosis   • CAD (coronary artery disease)   • PAD (peripheral artery disease) (CMS/HCC)   • Essential hypertension   • Mixed hyperlipidemia   • Asthma   • GERD (gastroesophageal reflux disease)   • Angina pectoris (CMS/HCC)   • Chronic asthmatic bronchitis (CMS/HCC)   • Peripheral vascular disease (CMS/HCC)   • Seasonal allergic rhinitis   • Edema of right lower extremity   • Annual physical exam   • Alcohol use   • Preoperative examination   • Acute asthmatic bronchitis   • Sinus bradycardia, persistent   • Postphlebitic syndrome       Review of Systems    Objective     Vitals:    11/01/19 0924   BP: 126/68   BP Location: Left arm   Patient Position:  "Sitting   Cuff Size: Adult   Pulse: 80   Weight: 79.4 kg (175 lb)   Height: 170.2 cm (67\")   PainSc: 0-No pain       Patient's Body mass index is 27.41 kg/m². BMI is within normal parameters. No follow-up required..      No exam data present    The patient has no evidence of cognitve impairment.   ATTENTION  What is the year: correct  What is the month of the year: correct  What is the day of the week?: correct  What is the date?: correct  MEMORY  Repeat address three times, only score third attempt: [address] [place], [place]: 7  HOW MANY ANIMALS DID THE PATIENT NAME  Verbal Fluency -- Animal Names (0-25): 22+  CLOCK DRAWING  Clock Drawing: All Correct  MEMORY RECALL  Tell me what you remember about that name and address we were repeating at the beginnin  ACE TOTAL SCORE  Total ACE Score - <25/30 strongly suggests cognitive impairment; <21/30 almost certainly shows dementia: 27    Physical Exam    Recent Lab Results:  Lab Results   Component Value Date     (H) 2019     Lab Results   Component Value Date    TRIG 63 2019    HDL 62 (H) 2019    VLDL 12.6 2019       Assessment/Plan   Age-appropriate Screening Schedule:  Refer to the list below for future screening recommendations based on patient's age, sex and/or medical conditions.      Health Maintenance   Topic Date Due   • ZOSTER VACCINE (2 of 3) 08/15/2012   • INFLUENZA VACCINE  2019   • LIPID PANEL  2020   • TDAP/TD VACCINES (2 - Td) 2023   • COLONOSCOPY  2029   • PNEUMOCOCCAL VACCINES (65+ LOW/MEDIUM RISK)  Completed       Medicare Risks and Personalized Health Plan:  Cardiovascular risk      CMS-Preventive Services Quick Reference  Medicare Preventive Services Addressed:  Annual Wellness Visit (AWV)    Advance Care Planning:  Patient has an advance directive - a copy has not been provided. Have asked the patient to send this to us to add to record    Diagnoses and all orders for " this visit:    1. Well adult exam (Primary)  Comments:  He is fasting for labs.  Normal cognitive assessment ACE score 27/30.  Vaccines are up-to-date.    2. Essential hypertension  -     MicroAlbumin, Urine, Random - Urine, Clean Catch; Future    3. Mixed hyperlipidemia  -     Comprehensive Metabolic Panel; Future  -     Lipid Panel; Future    4. Gastroesophageal reflux disease without esophagitis  -     CBC & Differential; Future    5. Screening PSA (prostate specific antigen)  -     PSA Screen; Future        An After Visit Summary and PPPS with all of these plans were given to the patient.      Follow Up:  Return for Next scheduled follow up, labs today.

## 2019-11-02 LAB
ALBUMIN SERPL-MCNC: 4.2 G/DL (ref 3.5–5.2)
ALBUMIN/GLOB SERPL: 1.6 G/DL
ALP SERPL-CCNC: 72 U/L (ref 39–117)
ALT SERPL-CCNC: 11 U/L (ref 1–41)
AST SERPL-CCNC: 12 U/L (ref 1–40)
BASOPHILS # BLD AUTO: 0.08 10*3/MM3 (ref 0–0.2)
BASOPHILS NFR BLD AUTO: 0.8 % (ref 0–1.5)
BILIRUB SERPL-MCNC: 0.9 MG/DL (ref 0.2–1.2)
BUN SERPL-MCNC: 16 MG/DL (ref 8–23)
BUN/CREAT SERPL: 14.3 (ref 7–25)
CALCIUM SERPL-MCNC: 9.2 MG/DL (ref 8.6–10.5)
CHLORIDE SERPL-SCNC: 100 MMOL/L (ref 98–107)
CHOLEST SERPL-MCNC: 168 MG/DL (ref 0–200)
CO2 SERPL-SCNC: 32.2 MMOL/L (ref 22–29)
CREAT SERPL-MCNC: 1.12 MG/DL (ref 0.76–1.27)
EOSINOPHIL # BLD AUTO: 0.96 10*3/MM3 (ref 0–0.4)
EOSINOPHIL NFR BLD AUTO: 9.6 % (ref 0.3–6.2)
ERYTHROCYTE [DISTWIDTH] IN BLOOD BY AUTOMATED COUNT: 13.2 % (ref 12.3–15.4)
GLOBULIN SER CALC-MCNC: 2.7 GM/DL
GLUCOSE SERPL-MCNC: 107 MG/DL (ref 65–99)
HCT VFR BLD AUTO: 45.8 % (ref 37.5–51)
HDLC SERPL-MCNC: 56 MG/DL (ref 40–60)
HGB BLD-MCNC: 15.3 G/DL (ref 13–17.7)
IMM GRANULOCYTES # BLD AUTO: 0.03 10*3/MM3 (ref 0–0.05)
IMM GRANULOCYTES NFR BLD AUTO: 0.3 % (ref 0–0.5)
LDLC SERPL CALC-MCNC: 96 MG/DL (ref 0–100)
LYMPHOCYTES # BLD AUTO: 1.64 10*3/MM3 (ref 0.7–3.1)
LYMPHOCYTES NFR BLD AUTO: 16.4 % (ref 19.6–45.3)
MCH RBC QN AUTO: 28.8 PG (ref 26.6–33)
MCHC RBC AUTO-ENTMCNC: 33.4 G/DL (ref 31.5–35.7)
MCV RBC AUTO: 86.1 FL (ref 79–97)
MICROALBUMIN UR-MCNC: 7.1 UG/ML
MONOCYTES # BLD AUTO: 1.03 10*3/MM3 (ref 0.1–0.9)
MONOCYTES NFR BLD AUTO: 10.3 % (ref 5–12)
NEUTROPHILS # BLD AUTO: 6.27 10*3/MM3 (ref 1.7–7)
NEUTROPHILS NFR BLD AUTO: 62.6 % (ref 42.7–76)
NRBC BLD AUTO-RTO: 0 /100 WBC (ref 0–0.2)
PLATELET # BLD AUTO: 281 10*3/MM3 (ref 140–450)
POTASSIUM SERPL-SCNC: 4 MMOL/L (ref 3.5–5.2)
PROT SERPL-MCNC: 6.9 G/DL (ref 6–8.5)
PSA SERPL-MCNC: 2.22 NG/ML (ref 0–4)
RBC # BLD AUTO: 5.32 10*6/MM3 (ref 4.14–5.8)
SODIUM SERPL-SCNC: 144 MMOL/L (ref 136–145)
TRIGL SERPL-MCNC: 82 MG/DL (ref 0–150)
VLDLC SERPL CALC-MCNC: 16.4 MG/DL
WBC # BLD AUTO: 10.01 10*3/MM3 (ref 3.4–10.8)

## 2019-11-04 ENCOUNTER — TELEPHONE (OUTPATIENT)
Dept: CARDIOLOGY | Facility: CLINIC | Age: 74
End: 2019-11-04

## 2019-11-04 NOTE — TELEPHONE ENCOUNTER
"Patient called to request to get in to talk to Dr. Escalera due to sats decreasing and having some discomfort in chest.  Saw PCP on 11/1/19, patient states \"that went nowhere\" and says I may need some more stents.  Advised will ask  to place on next available.   "

## 2019-11-06 ENCOUNTER — HOSPITAL ENCOUNTER (OUTPATIENT)
Dept: GENERAL RADIOLOGY | Facility: HOSPITAL | Age: 74
Discharge: HOME | End: 2019-11-06
Admitting: INTERNAL MEDICINE

## 2019-11-06 ENCOUNTER — OFFICE VISIT (OUTPATIENT)
Dept: INTERNAL MEDICINE | Facility: CLINIC | Age: 74
End: 2019-11-06

## 2019-11-06 ENCOUNTER — OFFICE VISIT (OUTPATIENT)
Dept: CARDIOLOGY | Facility: CLINIC | Age: 74
End: 2019-11-06

## 2019-11-06 VITALS
DIASTOLIC BLOOD PRESSURE: 68 MMHG | HEIGHT: 68 IN | HEART RATE: 76 BPM | SYSTOLIC BLOOD PRESSURE: 132 MMHG | BODY MASS INDEX: 26.22 KG/M2 | WEIGHT: 173 LBS

## 2019-11-06 VITALS
WEIGHT: 176 LBS | DIASTOLIC BLOOD PRESSURE: 64 MMHG | HEART RATE: 76 BPM | SYSTOLIC BLOOD PRESSURE: 130 MMHG | OXYGEN SATURATION: 91 % | BODY MASS INDEX: 26.67 KG/M2 | HEIGHT: 68 IN

## 2019-11-06 DIAGNOSIS — I25.10 CORONARY ARTERY DISEASE INVOLVING NATIVE CORONARY ARTERY OF NATIVE HEART WITHOUT ANGINA PECTORIS: Primary | ICD-10-CM

## 2019-11-06 DIAGNOSIS — J45.40 MODERATE PERSISTENT ASTHMA WITHOUT COMPLICATION: ICD-10-CM

## 2019-11-06 DIAGNOSIS — L57.0 ACTINIC KERATOSES: ICD-10-CM

## 2019-11-06 DIAGNOSIS — I10 ESSENTIAL HYPERTENSION: ICD-10-CM

## 2019-11-06 DIAGNOSIS — E78.2 MIXED HYPERLIPIDEMIA: ICD-10-CM

## 2019-11-06 DIAGNOSIS — I20.9 ANGINA PECTORIS (HCC): ICD-10-CM

## 2019-11-06 DIAGNOSIS — K21.9 GASTROESOPHAGEAL REFLUX DISEASE WITHOUT ESOPHAGITIS: ICD-10-CM

## 2019-11-06 DIAGNOSIS — R06.09 DOE (DYSPNEA ON EXERTION): ICD-10-CM

## 2019-11-06 DIAGNOSIS — J44.9 CHRONIC ASTHMATIC BRONCHITIS (HCC): ICD-10-CM

## 2019-11-06 DIAGNOSIS — I10 ESSENTIAL HYPERTENSION: Primary | ICD-10-CM

## 2019-11-06 DIAGNOSIS — I25.10 CORONARY ARTERY DISEASE INVOLVING NATIVE CORONARY ARTERY OF NATIVE HEART WITHOUT ANGINA PECTORIS: ICD-10-CM

## 2019-11-06 PROCEDURE — 71046 X-RAY EXAM CHEST 2 VIEWS: CPT

## 2019-11-06 PROCEDURE — 17110 DESTRUCTION B9 LES UP TO 14: CPT | Performed by: INTERNAL MEDICINE

## 2019-11-06 PROCEDURE — 94010 BREATHING CAPACITY TEST: CPT | Performed by: INTERNAL MEDICINE

## 2019-11-06 PROCEDURE — 99214 OFFICE O/P EST MOD 30 MIN: CPT | Performed by: INTERNAL MEDICINE

## 2019-11-06 PROCEDURE — 93010 ELECTROCARDIOGRAM REPORT: CPT | Performed by: INTERNAL MEDICINE

## 2019-11-06 RX ORDER — ATORVASTATIN CALCIUM 20 MG/1
20 TABLET, FILM COATED ORAL
Qty: 30 TABLET | Refills: 5 | Status: SHIPPED | OUTPATIENT
Start: 2019-11-06 | End: 2020-06-01

## 2019-11-06 RX ORDER — DOXYCYCLINE HYCLATE 100 MG/1
100 CAPSULE ORAL 2 TIMES DAILY
Qty: 28 CAPSULE | Refills: 0 | Status: SHIPPED | OUTPATIENT
Start: 2019-11-06 | End: 2019-11-20

## 2019-11-06 NOTE — PROGRESS NOTES
[facility]     Uzair Jacob  1945   [id number]      Patient Care Team:  Simon Easley MD as PCP - General  Vic Blas MD as Consulting Physician (Cardiology)    Chief Complaint::   Chief Complaint   Patient presents with   • Coronary Artery Disease   • Hyperlipidemia   • Hypertension   • Asthma        HPI  Mr. Jacob is now 74.  He comes in for follow-up of his hypertension, coronary artery disease, asthma, hyperlipidemia, coronary artery disease and GERD.  His main concern recently has been persistent shortness of breath.  He saw Dr. Blas recently, who feels it is not his heart, but has scheduled a stress test.  He ordered a chest x-ray which by my reading unofficially shows chronic changes.  He complains of congestion and cough.  He has long-standing asthma and has been on Advair.  It was noted at cardiac rehab that after he had been on the treadmill for some time is O2 sat dropped from the low 90s to 89.  He has not found this limiting in any way.    Chronic Conditions:      Patient Active Problem List   Diagnosis   • CAD (coronary artery disease)   • PAD (peripheral artery disease) (CMS/[facility])   • Essential hypertension   • Mixed hyperlipidemia   • Asthma   • GERD (gastroesophageal reflux disease)   • Angina pectoris (CMS/HCC)   • Chronic asthmatic bronchitis (CMS/[facility])   • Peripheral vascular disease (CMS/HCC)   • Seasonal allergic rhinitis   • Edema of right lower extremity   • Annual physical exam   • Alcohol use   • Preoperative examination   • Acute asthmatic bronchitis   • Sinus bradycardia, persistent   • Postphlebitic syndrome        Past Medical History:   Diagnosis Date   • Asthma 3/8/2017   • CAD (coronary artery disease) 3/8/2017   • Chest pain     stent to LAD 2012   • Deep vein thrombosis (DVT) of right lower extremity (CMS/HCC) 4/18/2019   • GERD (gastroesophageal reflux disease) 3/8/2017   • Hyperlipidemia 3/8/2017   • Hypertension 3/8/2017   • Nasal polyps      nasal polypectomy 1996   • PAD (peripheral artery disease) (CMS/HCC) 3/8/2017       Past Surgical History:   Procedure Laterality Date   • CORONARY ANGIOPLASTY WITH STENT PLACEMENT  03/2012    Chest pain; stent to LAD   • NASAL POLYP SURGERY  1996    Nasal polyps   • POLYPECTOMY      Sinus polyp extraction.   • TONSILLECTOMY         Family History   Problem Relation Age of Onset   • Esophageal cancer Father         cause of death       Social History     Socioeconomic History   • Marital status:      Spouse name: Not on file   • Number of children: Not on file   • Years of education: Not on file   • Highest education level: Not on file   Tobacco Use   • Smoking status: Never Smoker   • Smokeless tobacco: Never Used   Substance and Sexual Activity   • Alcohol use: Yes     Frequency: Monthly or less     Drinks per session: 1 or 2     Binge frequency: Never     Comment: socially   • Drug use: No   • Sexual activity: Defer       No Known Allergies      Current Outpatient Medications:   •  albuterol sulfate  (90 Base) MCG/ACT inhaler, Inhale 2 puffs Every 4 (Four) Hours As Needed (inhale 2 puffs by inhalation route every 4-6 hours as needed)., Disp: 1 inhaler, Rfl: 5  •  aspirin 325 MG tablet, Take 325 mg by mouth Daily., Disp: , Rfl:   •  atorvastatin (LIPITOR) 20 MG tablet, Take 1 tablet by mouth every night at bedtime., Disp: 30 tablet, Rfl: 5  •  chlorthalidone (HYGROTON) 25 MG tablet, TAKE 1/2 TABLET DAILY., Disp: 45 tablet, Rfl: 3  •  doxycycline (VIBRAMYCIN) 100 MG capsule, Take 1 capsule by mouth 2 (Two) Times a Day for 14 days., Disp: 28 capsule, Rfl: 0  •  fexofenadine (ALLEGRA) 180 MG tablet, Take 180 mg by mouth Daily As Needed., Disp: , Rfl:   •  Fluticasone Furoate-Vilanterol (BREO ELLIPTA) 100-25 MCG/INH inhaler, Inhale 1 puff Daily., Disp: 1 inhaler, Rfl: 0  •  lisinopril (PRINIVIL,ZESTRIL) 10 MG tablet, TAKE 1 TABLET BY MOUTH EVERY DAY, Disp: 90 tablet, Rfl: 3  •  nebivolol (BYSTOLIC) 10  "MG tablet, Take 1 tablet by mouth Daily., Disp: 90 tablet, Rfl: 3  •  omeprazole (priLOSEC) 20 MG capsule, Take 20 mg by mouth Daily., Disp: , Rfl:   •  Triamcinolone Acetonide (NASACORT) 55 MCG/ACT nasal inhaler, 2 sprays into the nostril(s) as directed by provider 2 (Two) Times a Day., Disp: , Rfl:     Review of Systems   Constitutional: Negative for chills, fatigue and fever.   HENT: Positive for congestion. Negative for ear pain and sinus pressure.    Respiratory: Positive for cough, shortness of breath and wheezing. Negative for chest tightness.    Cardiovascular: Negative for chest pain and palpitations.   Gastrointestinal: Negative for abdominal pain, blood in stool and constipation.   Skin: Negative for color change.   Allergic/Immunologic: Negative for environmental allergies.   Neurological: Negative for dizziness, speech difficulty and headache.   Psychiatric/Behavioral: Negative for decreased concentration. The patient is not nervous/anxious.         Vital Signs  Vitals:    11/06/19 1325   BP: 132/68   BP Location: Left arm   Patient Position: Sitting   Cuff Size: Adult   Pulse: 76   Weight: 78.5 kg (173 lb)   Height: 172.7 cm (67.99\")   PainSc: 0-No pain       Physical Exam   Constitutional: He is oriented to person, place, and time. He appears well-developed and well-nourished.   HENT:   Head: Normocephalic and atraumatic.   Right Ear: External ear normal.   Left Ear: External ear normal.   Nose: Nose normal.   Mouth/Throat: Oropharynx is clear and moist. No oropharyngeal exudate.   Eyes: Conjunctivae and EOM are normal. Pupils are equal, round, and reactive to light.   Neck: Normal range of motion. Neck supple. No JVD present. No thyromegaly present.   Cardiovascular: Normal rate, regular rhythm, normal heart sounds and intact distal pulses. Exam reveals no gallop and no friction rub.   No murmur heard.  Pulmonary/Chest: Effort normal. No respiratory distress. He has wheezes in the right upper field, " the right middle field, the right lower field, the left upper field, the left middle field and the left lower field. He has no rales. He exhibits no tenderness.   Coarse expiratory wheezes throughout on forced expiration.   Abdominal: Soft. Bowel sounds are normal. He exhibits no distension and no mass. There is no tenderness. There is no rebound and no guarding. No hernia.   Musculoskeletal: Normal range of motion. He exhibits no tenderness.   Lymphadenopathy:     He has no cervical adenopathy.   Neurological: He is alert and oriented to person, place, and time. He displays normal reflexes. No cranial nerve deficit or sensory deficit. He exhibits normal muscle tone. Coordination normal.   Skin: Skin is warm and dry. No rash noted. No erythema.   He has crusted lesions on his scalp, 2 AK's on each ear 4 on his scalp.   Psychiatric: He has a normal mood and affect. His behavior is normal. Judgment and thought content normal.   Nursing note and vitals reviewed.     Cryotherapy, Skin Lesion  Date/Time: 11/6/2019 9:30 PM  Performed by: Simon Easley MD  Authorized by: Simon Easley MD   Local anesthesia used: no    Anesthesia:  Local anesthesia used: no    Sedation:  Patient sedated: no    Patient tolerance: Patient tolerated the procedure well with no immediate complications  Comments: 2 crusted lesions on the left ear, 2 crusted lesions on the right here, and 4 crusted lesions on his scalp were treated with cryotherapy.          ACE III MINI      PFTs show severe obstruction with FVC of 2.80 and FEV1 of 1.16.      Assessment/Plan:    Uzair was seen today for coronary artery disease, hyperlipidemia, hypertension and asthma.    Diagnoses and all orders for this visit:    Essential hypertension    Angina pectoris (CMS/HCC)    Chronic asthmatic bronchitis (CMS/HCC)    Mixed hyperlipidemia    Coronary artery disease involving native coronary artery of native heart without angina  pectoris    Gastroesophageal reflux disease without esophagitis    Plan    Blood pressure is well controlled on lisinopril, chlorthalidone and Bystolic.    Angina is asymptomatic on aspirin, atorvastatin, nebivolol, and lisinopril.    He may well now have emphysema, a consequence of long-standing asthma, despite being on controller therapy with Advair for many years.  Chest x-ray reading is pending, PFTs show a severe obstructive component which does not appear reversible.  He will continue the trial of Breo given by Dr. Blas.  However if he is not improved in 2 to 3 weeks, would consider an anticholinergic such as Spiriva.  If that does not help with then consider pulmonary referral.    Lipids are well controlled on atorvastatin and healthy diet.    GERD is stable on omeprazole.      Plan of care reviewed with patient at the conclusion of today's visit. Education was provided regarding diagnosis, management, and any prescribed or recommended OTC medications.Patient verbalizes understanding of and agreement with management plan.         Simon Easley MD

## 2019-11-06 NOTE — PROGRESS NOTES
Subjective:     Encounter Date:11/06/2019    Primary Care Physician: Simon Easley MD      Patient ID: Uzair Jacob is a 74 y.o. male.    Chief Complaint:chest pain    PROBLEM LIST:  1. Coronary artery disease:  a. Catheterization, March 2012: 70% to 80% mid-LAD with ‘borderline” FFR. 3.5 x 15 Xience. EF normal.   2. Peripheral arterial disease:  a. Asymptomatic abnormal MADISON.  b. Right 0.7, left NC, April 2012.  3. Hypertension.  4. Hyperlipidemia.  5. DVT, right lower extremity.  1/18.  6. Asthma.  7. Gastroesophageal reflux disease.  8. Tonsillectomy.  9. Sinus polyp extraction.        No Known Allergies      Current Outpatient Medications:   •  ADVAIR -21 MCG/ACT inhaler, INHALE 2 PUFFS TWICE A DAY IN MORNING AND EVENING, Disp: 36 inhaler, Rfl: 3  •  albuterol sulfate  (90 Base) MCG/ACT inhaler, Inhale 2 puffs Every 4 (Four) Hours As Needed (inhale 2 puffs by inhalation route every 4-6 hours as needed)., Disp: 1 inhaler, Rfl: 5  •  aspirin 325 MG tablet, Take 325 mg by mouth Daily., Disp: , Rfl:   •  atorvastatin (LIPITOR) 10 MG tablet, TAKE 1 TABLET BY MOUTH EVERY NIGHT AT BEDTIME. PLEASE DEFER TO PCP OR HAVE LIPID PANEL CHECKED, Disp: 90 tablet, Rfl: 3  •  chlorthalidone (HYGROTON) 25 MG tablet, TAKE 1/2 TABLET DAILY., Disp: 45 tablet, Rfl: 3  •  fexofenadine (ALLEGRA) 180 MG tablet, Take 180 mg by mouth Daily., Disp: , Rfl:   •  lisinopril (PRINIVIL,ZESTRIL) 10 MG tablet, TAKE 1 TABLET BY MOUTH EVERY DAY, Disp: 90 tablet, Rfl: 3  •  nebivolol (BYSTOLIC) 10 MG tablet, Take 1 tablet by mouth Daily., Disp: 90 tablet, Rfl: 3  •  omeprazole (priLOSEC) 20 MG capsule, Take 20 mg by mouth Daily., Disp: , Rfl:   •  Triamcinolone Acetonide (NASACORT) 55 MCG/ACT nasal inhaler, 2 sprays into the nostril(s) as directed by provider 2 (Two) Times a Day., Disp: , Rfl:         History of Present Illness    Patient returns today for follow-up of coronary artery disease peripheral till disease and for  "evaluation of new onset dyspnea.  Patient has been continued to cardiac rehab.  He notes over the last 6 to 9 months progressive dyspnea on exertion.  This is progressive he is also somewhat short of breath at rest.  He is doing cardiac rehab but notes his exercise capacity is decreased significantly, and his O2 saturations are dropping to the high 80s at the end of exercise before rapid recovery.  He notes he is over this last several months he is also had a cough, productive of some mildly \"green\" sputum.  He notes some wheezing.  He has lots of \"secretions\", and requires taking a Benadryl at night so that he can breathe.  When the \"Benadryl wears off\", he notes he is dyspneic again.  Saw his recent primary care physician's PA, where CBC BMP were all normal.  Does note some chest discomfort in his left chest with heavy exertion as well over the same time..  He does not have this at rest.    The following portions of the patient's history were reviewed and updated as appropriate: allergies, current medications, past family history, past medical history, past social history, past surgical history and problem list.      Social History     Tobacco Use   • Smoking status: Never Smoker   • Smokeless tobacco: Never Used   Substance Use Topics   • Alcohol use: Yes     Frequency: Monthly or less     Drinks per session: 1 or 2     Binge frequency: Never     Comment: socially   • Drug use: No         Review of Systems   Constitution: Negative.   Cardiovascular: Positive for chest pain.   Respiratory: Positive for cough, shortness of breath, sputum production and wheezing.    Hematologic/Lymphatic: Negative for bleeding problem. Does not bruise/bleed easily.   Skin: Negative for rash.   Musculoskeletal: Negative for muscle weakness and myalgias.   Gastrointestinal: Negative for heartburn, nausea and vomiting.   Neurological: Negative.           Objective:    height is 172.7 cm (68\") and weight is 79.8 kg (176 lb). His blood " pressure is 130/64 and his pulse is 76. His oxygen saturation is 91%.         Physical Exam   Constitutional: He is oriented to person, place, and time. He appears well-developed and well-nourished.   HENT:   Mouth/Throat: Oropharynx is clear and moist.   Neck: No JVD present. Carotid bruit is not present. No thyromegaly present.   Cardiovascular: Regular rhythm, S1 normal, S2 normal, normal heart sounds and intact distal pulses. Exam reveals no gallop, no S3 and no S4.   No murmur heard.  Pulses:       Carotid pulses are 2+ on the right side, and 2+ on the left side.       Radial pulses are 2+ on the right side, and 2+ on the left side.   Pulmonary/Chest: He has rhonchi in the right middle field, the right lower field, the left middle field and the left lower field.   Abdominal: Soft. Bowel sounds are normal. He exhibits no mass. There is no tenderness.   Musculoskeletal: He exhibits no edema.   Neurological: He is alert and oriented to person, place, and time.   Skin: Skin is warm and dry. No rash noted.         ECG 12 Lead  Date/Time: 11/6/2019 11:09 AM  Performed by: Vic Blas MD  Authorized by: Vic Blas MD   Previous ECG: no previous ECG available  Rhythm: sinus rhythm  Q waves: II, III and aVF                      Assessment:   Assessment/Plan      Diagnoses and all orders for this visit:    Coronary artery disease involving native coronary artery of native heart without angina pectoris  -     Stress Test With Myocardial Perfusion; Future  -     Adult Transthoracic Echo Complete W/ Cont if Necessary Per Protocol; Future    GOODSON (dyspnea on exertion)  -     XR Chest PA & Lateral; Future  -     Stress Test With Myocardial Perfusion; Future  -     Adult Transthoracic Echo Complete W/ Cont if Necessary Per Protocol; Future    Essential hypertension    Mixed hyperlipidemia    Moderate persistent asthma without complication    Other orders  -     doxycycline (VIBRAMYCIN) 100 MG capsule; Take 1 capsule  by mouth 2 (Two) Times a Day for 14 days.  -     Fluticasone Furoate-Vilanterol (BREO ELLIPTA) 100-25 MCG/INH inhaler; Inhale 1 puff Daily.  -     ECG 12 Lead      1.  Coronary artery disease, dyspnea on exertion and chest discomfort on exertion possible angina.  Will evaluate initially noninvasively with an echocardiogram, and stress myocardial perfusion study.  2.  Asthma, hypoxic with exertion, with the abnormal examination and hypoxia believe this is the most likely cause of his symptoms of dyspnea on exertion.  We will check a chest x-ray today.  We will give the patient Brio metered-dose inhaler (discontinue Advair).  Also 2-week course of doxycycline 100 mg twice daily.  3.  Hypertension, well-controlled today will not change medical therapy  4.  Dyslipidemia LDL checked earlier this week of 96.  Will increase atorvastatin to 20 mg nightly  We will follow-up after the above testing.    Vic Blas MD             Dictated utilizing Dragon dictation

## 2019-12-06 ENCOUNTER — HOSPITAL ENCOUNTER (OUTPATIENT)
Dept: CARDIOLOGY | Facility: HOSPITAL | Age: 74
Discharge: HOME | End: 2019-12-06

## 2019-12-06 ENCOUNTER — HOSPITAL ENCOUNTER (OUTPATIENT)
Dept: CARDIOLOGY | Facility: HOSPITAL | Age: 74
Discharge: HOME | End: 2019-12-06
Admitting: INTERNAL MEDICINE

## 2019-12-06 VITALS — WEIGHT: 173.06 LBS | HEIGHT: 68 IN | BODY MASS INDEX: 26.23 KG/M2

## 2019-12-06 VITALS — BODY MASS INDEX: 26.23 KG/M2 | HEIGHT: 68 IN | WEIGHT: 173.06 LBS

## 2019-12-06 DIAGNOSIS — R06.09 DOE (DYSPNEA ON EXERTION): ICD-10-CM

## 2019-12-06 DIAGNOSIS — I25.10 CORONARY ARTERY DISEASE INVOLVING NATIVE CORONARY ARTERY OF NATIVE HEART WITHOUT ANGINA PECTORIS: ICD-10-CM

## 2019-12-06 LAB
ASCENDING AORTA: 3.46 CM
BH CV ECHO MEAS - AO MAX PG (FULL): 2.1 MMHG
BH CV ECHO MEAS - AO MAX PG: 5.1 MMHG
BH CV ECHO MEAS - AO MEAN PG (FULL): 0.81 MMHG
BH CV ECHO MEAS - AO MEAN PG: 2.3 MMHG
BH CV ECHO MEAS - AO ROOT AREA (BSA CORRECTED): 1.8
BH CV ECHO MEAS - AO ROOT AREA: 9.9 CM^2
BH CV ECHO MEAS - AO ROOT DIAM: 3.6 CM
BH CV ECHO MEAS - AO V2 MAX: 112.4 CM/SEC
BH CV ECHO MEAS - AO V2 MEAN: 68 CM/SEC
BH CV ECHO MEAS - AO V2 VTI: 24.2 CM
BH CV ECHO MEAS - AVA(I,A): 2.7 CM^2
BH CV ECHO MEAS - AVA(I,D): 2.7 CM^2
BH CV ECHO MEAS - AVA(V,A): 2.4 CM^2
BH CV ECHO MEAS - AVA(V,D): 2.4 CM^2
BH CV ECHO MEAS - BSA(HAYCOCK): 2 M^2
BH CV ECHO MEAS - BSA: 1.9 M^2
BH CV ECHO MEAS - BZI_BMI: 26.6 KILOGRAMS/M^2
BH CV ECHO MEAS - BZI_METRIC_HEIGHT: 172.7 CM
BH CV ECHO MEAS - BZI_METRIC_WEIGHT: 79.4 KG
BH CV ECHO MEAS - EDV(CUBED): 109.2 ML
BH CV ECHO MEAS - EDV(MOD-SP2): 56 ML
BH CV ECHO MEAS - EDV(MOD-SP4): 62 ML
BH CV ECHO MEAS - EDV(TEICH): 106.5 ML
BH CV ECHO MEAS - EF(CUBED): 74.1 %
BH CV ECHO MEAS - EF(MOD-BP): 57 %
BH CV ECHO MEAS - EF(MOD-SP2): 57.1 %
BH CV ECHO MEAS - EF(MOD-SP4): 58.1 %
BH CV ECHO MEAS - EF(TEICH): 65.9 %
BH CV ECHO MEAS - ESV(CUBED): 28.3 ML
BH CV ECHO MEAS - ESV(MOD-SP2): 24 ML
BH CV ECHO MEAS - ESV(MOD-SP4): 26 ML
BH CV ECHO MEAS - ESV(TEICH): 36.4 ML
BH CV ECHO MEAS - FS: 36.3 %
BH CV ECHO MEAS - IVS/LVPW: 1.1
BH CV ECHO MEAS - IVSD: 0.89 CM
BH CV ECHO MEAS - LA DIMENSION: 3.4 CM
BH CV ECHO MEAS - LA/AO: 0.95
BH CV ECHO MEAS - LAD MAJOR: 4.6 CM
BH CV ECHO MEAS - LAT PEAK E' VEL: 7.7 CM/SEC
BH CV ECHO MEAS - LATERAL E/E' RATIO: 8.1
BH CV ECHO MEAS - LV DIASTOLIC VOL/BSA (35-75): 32.1 ML/M^2
BH CV ECHO MEAS - LV MASS(C)D: 133.2 GRAMS
BH CV ECHO MEAS - LV MASS(C)DI: 69 GRAMS/M^2
BH CV ECHO MEAS - LV MAX PG: 2.9 MMHG
BH CV ECHO MEAS - LV MEAN PG: 1.5 MMHG
BH CV ECHO MEAS - LV SYSTOLIC VOL/BSA (12-30): 13.5 ML/M^2
BH CV ECHO MEAS - LV V1 MAX: 85.7 CM/SEC
BH CV ECHO MEAS - LV V1 MEAN: 56.9 CM/SEC
BH CV ECHO MEAS - LV V1 VTI: 21.4 CM
BH CV ECHO MEAS - LVIDD: 4.8 CM
BH CV ECHO MEAS - LVIDS: 3 CM
BH CV ECHO MEAS - LVLD AP2: 6.2 CM
BH CV ECHO MEAS - LVLD AP4: 7.1 CM
BH CV ECHO MEAS - LVLS AP2: 4.9 CM
BH CV ECHO MEAS - LVLS AP4: 6.1 CM
BH CV ECHO MEAS - LVOT AREA (M): 3.1 CM^2
BH CV ECHO MEAS - LVOT AREA: 3.1 CM^2
BH CV ECHO MEAS - LVOT DIAM: 2 CM
BH CV ECHO MEAS - LVPWD: 0.78 CM
BH CV ECHO MEAS - MED PEAK E' VEL: 5.4 CM/SEC
BH CV ECHO MEAS - MEDIAL E/E' RATIO: 11.5
BH CV ECHO MEAS - MV A MAX VEL: 106.1 CM/SEC
BH CV ECHO MEAS - MV DEC TIME: 0.26 SEC
BH CV ECHO MEAS - MV E MAX VEL: 63.7 CM/SEC
BH CV ECHO MEAS - MV E/A: 0.6
BH CV ECHO MEAS - PA ACC SLOPE: 432.9 CM/SEC^2
BH CV ECHO MEAS - PA ACC TIME: 0.14 SEC
BH CV ECHO MEAS - PA MAX PG: 2.6 MMHG
BH CV ECHO MEAS - PA MEAN PG: 1.2 MMHG
BH CV ECHO MEAS - PA PR(ACCEL): 15.6 MMHG
BH CV ECHO MEAS - PA V2 MAX: 79.9 CM/SEC
BH CV ECHO MEAS - PA V2 MEAN: 50.9 CM/SEC
BH CV ECHO MEAS - PA V2 VTI: 19.5 CM
BH CV ECHO MEAS - SI(AO): 124 ML/M^2
BH CV ECHO MEAS - SI(CUBED): 41.9 ML/M^2
BH CV ECHO MEAS - SI(LVOT): 34.3 ML/M^2
BH CV ECHO MEAS - SI(MOD-SP2): 16.6 ML/M^2
BH CV ECHO MEAS - SI(MOD-SP4): 18.6 ML/M^2
BH CV ECHO MEAS - SI(TEICH): 36.3 ML/M^2
BH CV ECHO MEAS - SV(AO): 239.4 ML
BH CV ECHO MEAS - SV(CUBED): 80.9 ML
BH CV ECHO MEAS - SV(LVOT): 66.2 ML
BH CV ECHO MEAS - SV(MOD-SP2): 32 ML
BH CV ECHO MEAS - SV(MOD-SP4): 36 ML
BH CV ECHO MEAS - SV(TEICH): 70.1 ML
BH CV ECHO MEAS - TAPSE (>1.6): 2.4 CM2
BH CV ECHO MEASUREMENTS AVERAGE E/E' RATIO: 9.73
BH CV STRESS DURATION MIN STAGE 1: 3
BH CV STRESS DURATION MIN STAGE 2: 2
BH CV STRESS DURATION SEC STAGE 1: 0
BH CV STRESS DURATION SEC STAGE 2: 50
BH CV STRESS GRADE STAGE 1: 10
BH CV STRESS GRADE STAGE 2: 12
BH CV STRESS HR STAGE 1: 100
BH CV STRESS METS STAGE 1: 5
BH CV STRESS METS STAGE 2: 7.5
BH CV STRESS O2 STAGE 1: 88
BH CV STRESS O2 STAGE 2: 86
BH CV STRESS PROTOCOL 1: NORMAL
BH CV STRESS PROTOCOL 2 BP STAGE 1: NORMAL
BH CV STRESS PROTOCOL 2 BP STAGE 2: NORMAL
BH CV STRESS PROTOCOL 2 BP STAGE 3: 104
BH CV STRESS PROTOCOL 2 BP STAGE 4: 115
BH CV STRESS PROTOCOL 2 BP STAGE 5: 125
BH CV STRESS PROTOCOL 2 DOSE DOBUTAMINE STAGE 1: 10
BH CV STRESS PROTOCOL 2 DOSE DOBUTAMINE STAGE 2: 20
BH CV STRESS PROTOCOL 2 DOSE DOBUTAMINE STAGE 3: 30
BH CV STRESS PROTOCOL 2 DOSE DOBUTAMINE STAGE 4: 40
BH CV STRESS PROTOCOL 2 DURATION MIN STAGE 1: 2
BH CV STRESS PROTOCOL 2 DURATION MIN STAGE 2: 2
BH CV STRESS PROTOCOL 2 DURATION MIN STAGE 3: 2
BH CV STRESS PROTOCOL 2 DURATION MIN STAGE 4: 2
BH CV STRESS PROTOCOL 2 DURATION MIN STAGE 5: 2 MIN
BH CV STRESS PROTOCOL 2 DURATION SEC STAGE 1: 0
BH CV STRESS PROTOCOL 2 DURATION SEC STAGE 2: 0
BH CV STRESS PROTOCOL 2 DURATION SEC STAGE 3: 0
BH CV STRESS PROTOCOL 2 DURATION SEC STAGE 4: 0
BH CV STRESS PROTOCOL 2 DURATION SEC STAGE 5: 23 SEC
BH CV STRESS PROTOCOL 2 HR STAGE 1: 86
BH CV STRESS PROTOCOL 2 HR STAGE 2: 92
BH CV STRESS PROTOCOL 2 HR STAGE 3: NORMAL
BH CV STRESS PROTOCOL 2 HR STAGE 4: NORMAL
BH CV STRESS PROTOCOL 2 HR STAGE 5: NORMAL BPM
BH CV STRESS PROTOCOL 2 O2 STAGE 1: 95
BH CV STRESS PROTOCOL 2 O2 STAGE 2: 92
BH CV STRESS PROTOCOL 2 O2 STAGE 3: 93
BH CV STRESS PROTOCOL 2 O2 STAGE 4: 93
BH CV STRESS PROTOCOL 2 RATE STAGE 1: 48
BH CV STRESS PROTOCOL 2 RATE STAGE 2: 95
BH CV STRESS PROTOCOL 2 RATE STAGE 4: 191
BH CV STRESS PROTOCOL 2 RATE STAGE 7: 143 ML/HR
BH CV STRESS PROTOCOL 2 STAGE 1: 1
BH CV STRESS PROTOCOL 2 STAGE 2: 2
BH CV STRESS PROTOCOL 2 STAGE 3: 3
BH CV STRESS PROTOCOL 2 STAGE 4: 4
BH CV STRESS PROTOCOL 2 STAGE 5: 5
BH CV STRESS PROTOCOL 2: NORMAL
BH CV STRESS RECOVERY BP: NORMAL MMHG
BH CV STRESS RECOVERY HR: 101 BPM
BH CV STRESS RECOVERY O2: 97 %
BH CV STRESS SPEED STAGE 1: 1.7
BH CV STRESS SPEED STAGE 2: 2.5
BH CV STRESS STAGE 1: 1
BH CV STRESS STAGE 2: 2
BH CV VAS BP LEFT ARM: NORMAL MMHG
BH CV XLRA - RV BASE: 4.2 CM
BH CV XLRA - RV LENGTH: 7.3 CM
BH CV XLRA - RV MID: 2.7 CM
BH CV XLRA - TDI S': 13.7 CM/SEC
LEFT ATRIUM VOLUME INDEX: 20.7 ML/M2
LV EF 2D ECHO EST: 60 %
LV EF NUC BP: 70 %
Lab: 239 ML/HR
Lab: 90 %
MAXIMAL PREDICTED HEART RATE: 146 BPM
MAXIMAL PREDICTED HEART RATE: 146 BPM
PERCENT MAX PREDICTED HR: 86.3 %
STRESS BASELINE BP: NORMAL MMHG
STRESS BASELINE HR: 64 BPM
STRESS O2 SAT REST: 91 %
STRESS PERCENT HR: 102 %
STRESS POST ESTIMATED WORKLOAD: 7 METS
STRESS POST EXERCISE DUR MIN: 10 MIN
STRESS POST EXERCISE DUR SEC: 23 SEC
STRESS POST O2 SAT PEAK: 90 %
STRESS POST PEAK BP: NORMAL MMHG
STRESS POST PEAK HR: 126 BPM
STRESS TARGET HR: 124 BPM
STRESS TARGET HR: 124 BPM

## 2019-12-06 PROCEDURE — 93017 CV STRESS TEST TRACING ONLY: CPT

## 2019-12-06 PROCEDURE — A9500 TC99M SESTAMIBI: HCPCS | Performed by: INTERNAL MEDICINE

## 2019-12-06 PROCEDURE — 93018 CV STRESS TEST I&R ONLY: CPT | Performed by: INTERNAL MEDICINE

## 2019-12-06 PROCEDURE — 0 TECHNETIUM SESTAMIBI: Performed by: INTERNAL MEDICINE

## 2019-12-06 PROCEDURE — 78452 HT MUSCLE IMAGE SPECT MULT: CPT | Performed by: INTERNAL MEDICINE

## 2019-12-06 PROCEDURE — 93306 TTE W/DOPPLER COMPLETE: CPT

## 2019-12-06 PROCEDURE — 78452 HT MUSCLE IMAGE SPECT MULT: CPT

## 2019-12-06 PROCEDURE — 93306 TTE W/DOPPLER COMPLETE: CPT | Performed by: INTERNAL MEDICINE

## 2019-12-06 PROCEDURE — 25010000002 DOBUTAMINE PER 250 MG: Performed by: INTERNAL MEDICINE

## 2019-12-06 RX ORDER — DOBUTAMINE HYDROCHLORIDE 100 MG/100ML
20 INJECTION INTRAVENOUS
Status: DISCONTINUED | OUTPATIENT
Start: 2019-12-06 | End: 2019-12-07 | Stop reason: HOSPADM

## 2019-12-06 RX ORDER — NEBIVOLOL HYDROCHLORIDE 10 MG/1
TABLET ORAL
Qty: 90 TABLET | Refills: 3 | Status: SHIPPED | OUTPATIENT
Start: 2019-12-06 | End: 2020-11-18

## 2019-12-06 RX ADMIN — DOBUTAMINE HYDROCHLORIDE 20 MCG/KG/MIN: 100 INJECTION INTRAVENOUS at 13:37

## 2019-12-06 RX ADMIN — TECHNETIUM TC 99M SESTAMIBI 1 DOSE: 1 INJECTION INTRAVENOUS at 13:15

## 2019-12-06 RX ADMIN — TECHNETIUM TC 99M SESTAMIBI 1 DOSE: 1 INJECTION INTRAVENOUS at 10:15

## 2019-12-09 ENCOUNTER — TELEPHONE (OUTPATIENT)
Dept: CARDIOLOGY | Facility: CLINIC | Age: 74
End: 2019-12-09

## 2019-12-09 RX ORDER — ALBUTEROL SULFATE 90 UG/1
AEROSOL, METERED RESPIRATORY (INHALATION)
Qty: 18 INHALER | Refills: 5 | Status: SHIPPED | OUTPATIENT
Start: 2019-12-09 | End: 2020-01-31 | Stop reason: SDUPTHER

## 2019-12-09 NOTE — TELEPHONE ENCOUNTER
Spoke with patient and advised of below.     ----- Message from Vic Blas MD sent at 12/6/2019  5:55 PM EST -----  Normal results issues are pulmonary, would see pulmonary medicine or primary physician

## 2019-12-11 ENCOUNTER — OFFICE VISIT (OUTPATIENT)
Dept: CARDIOLOGY | Facility: CLINIC | Age: 74
End: 2019-12-11

## 2019-12-11 VITALS
SYSTOLIC BLOOD PRESSURE: 128 MMHG | BODY MASS INDEX: 26.83 KG/M2 | HEART RATE: 64 BPM | HEIGHT: 68 IN | DIASTOLIC BLOOD PRESSURE: 68 MMHG | WEIGHT: 177 LBS

## 2019-12-11 DIAGNOSIS — I25.10 CORONARY ARTERY DISEASE INVOLVING NATIVE CORONARY ARTERY OF NATIVE HEART WITHOUT ANGINA PECTORIS: Primary | ICD-10-CM

## 2019-12-11 DIAGNOSIS — R06.09 DOE (DYSPNEA ON EXERTION): ICD-10-CM

## 2019-12-11 DIAGNOSIS — I10 ESSENTIAL HYPERTENSION: ICD-10-CM

## 2019-12-11 DIAGNOSIS — I73.9 PAD (PERIPHERAL ARTERY DISEASE) (HCC): ICD-10-CM

## 2019-12-11 DIAGNOSIS — E78.2 MIXED HYPERLIPIDEMIA: ICD-10-CM

## 2019-12-11 PROCEDURE — 99212 OFFICE O/P EST SF 10 MIN: CPT | Performed by: INTERNAL MEDICINE

## 2019-12-11 NOTE — PROGRESS NOTES
Subjective:     Encounter Date:12/11/2019    Primary Care Physician: Simon Easley MD      Patient ID: Uzair Jacob is a 74 y.o. male.    Chief Complaint:No chief complaint on file.    PROBLEM LIST:  1. Coronary artery disease:  a. Catheterization, March 2012: 70% to 80% mid-LAD with ‘borderline” FFR. 3.5 x 15 Xience. EF normal.   b. 11/6/2019.  Normal stress perfusion and echo  2. Peripheral arterial disease:  a. Asymptomatic abnormal MADISON.  b. Right 0.7, left NC, April 2012.  3. Hypertension.  4. Hyperlipidemia.  5. DVT, right lower extremity.  1/18.  6. Asthma.  7. Gastroesophageal reflux disease.  8. Tonsillectomy.  9. Sinus polyp extraction.      No Known Allergies      Current Outpatient Medications:   •  aspirin 325 MG tablet, Take 325 mg by mouth Daily., Disp: , Rfl:   •  atorvastatin (LIPITOR) 20 MG tablet, Take 1 tablet by mouth every night at bedtime., Disp: 30 tablet, Rfl: 5  •  BYSTOLIC 10 MG tablet, TAKE 1 TABLET BY MOUTH EVERY DAY, Disp: 90 tablet, Rfl: 3  •  chlorthalidone (HYGROTON) 25 MG tablet, TAKE 1/2 TABLET DAILY., Disp: 45 tablet, Rfl: 3  •  fexofenadine (ALLEGRA) 180 MG tablet, Take 180 mg by mouth Daily As Needed., Disp: , Rfl:   •  Fluticasone Furoate-Vilanterol (BREO ELLIPTA) 100-25 MCG/INH inhaler, Inhale 1 puff Daily., Disp: 1 inhaler, Rfl: 0  •  lisinopril (PRINIVIL,ZESTRIL) 10 MG tablet, TAKE 1 TABLET BY MOUTH EVERY DAY, Disp: 90 tablet, Rfl: 3  •  omeprazole (priLOSEC) 20 MG capsule, Take 20 mg by mouth Daily., Disp: , Rfl:   •  Triamcinolone Acetonide (NASACORT) 55 MCG/ACT nasal inhaler, 2 sprays into the nostril(s) as directed by provider 2 (Two) Times a Day., Disp: , Rfl:   •  VENTOLIN  (90 Base) MCG/ACT inhaler, INHALE 2 PUFFS EVERY 4 HOURS AS NEEDED, Disp: 18 inhaler, Rfl: 5        History of Present Illness    Patient returns today for further evaluation of progressive dyspnea and coronary artery disease.  At her last visit he had progressive dyspnea on  exertion, given his known vascular disease he underwent noninvasive evaluation with echocardiography and stress Cardiolite.  His stress perfusion study showed no evidence of ischemia.  His echo was normal.  He notes with change in inhalers, no significant change in his dyspnea.  His x-ray showed some old histoplasmosis symptoms, however his PFTs did suggest COPD/asthma.  He returns today feeling no better nor worse than he was.  He is fine at rest but notes dyspnea on exertion.  He denies chest pain.    The following portions of the patient's history were reviewed and updated as appropriate: allergies, current medications, past family history, past medical history, past social history, past surgical history and problem list.      Social History     Tobacco Use   • Smoking status: Never Smoker   • Smokeless tobacco: Never Used   Substance Use Topics   • Alcohol use: Yes     Frequency: Monthly or less     Drinks per session: 1 or 2     Binge frequency: Never     Comment: socially   • Drug use: No         Review of Systems   Constitution: Negative.   Cardiovascular: Positive for dyspnea on exertion.   Respiratory: Positive for shortness of breath.    Hematologic/Lymphatic: Negative for bleeding problem. Does not bruise/bleed easily.   Skin: Negative for rash.   Musculoskeletal: Negative for muscle weakness and myalgias.   Gastrointestinal: Negative for heartburn, nausea and vomiting.   Neurological: Negative.           Objective:    vitals were not taken for this visit.         Physical Exam   Constitutional: He is oriented to person, place, and time. He appears well-developed and well-nourished.   HENT:   Mouth/Throat: Oropharynx is clear and moist.   Neck: No JVD present. Carotid bruit is not present. No thyromegaly present.   Cardiovascular: Regular rhythm, S1 normal, S2 normal, normal heart sounds and intact distal pulses. Exam reveals no gallop, no S3 and no S4.   No murmur heard.  Pulses:       Carotid pulses are  2+ on the right side, and 2+ on the left side.       Radial pulses are 2+ on the right side, and 2+ on the left side.   Pulmonary/Chest: He has decreased breath sounds in the right lower field and the left lower field. He has rhonchi in the right middle field, the right lower field, the left middle field and the left lower field.   Abdominal: Soft. Bowel sounds are normal. He exhibits no mass. There is no tenderness.   Musculoskeletal: He exhibits no edema.   Neurological: He is alert and oriented to person, place, and time.   Skin: Skin is warm and dry. No rash noted.       Procedures          Assessment:   Assessment/Plan      Uzair was seen today for follow-up.    Diagnoses and all orders for this visit:    Coronary artery disease involving native coronary artery of native heart without angina pectoris    PAD (peripheral artery disease) (CMS/[facility])    Essential hypertension    Mixed hyperlipidemia    GOODSON (dyspnea on exertion)      1.  Dyspnea on exertion with normal noninvasive studies.  Do not think this represents ischemia or cardiac disease.  2.  COPD/chronic asthma is likely the cause of his symptoms.  Will defer definitive therapy to primary physician plus minus pulmonary medicine.  Perhaps he would benefit from an inhaled steroid.  3.  Hypertension and lipids are both well controlled with the continued current medical therapy  Revisit annually or PRN symptom change.    MD Kalpana Calvo     Dictated utilizing Dragon dictation

## 2019-12-12 ENCOUNTER — TELEPHONE (OUTPATIENT)
Dept: INTERNAL MEDICINE | Facility: CLINIC | Age: 74
End: 2019-12-12

## 2019-12-12 DIAGNOSIS — R06.02 SHORTNESS OF BREATH: Primary | ICD-10-CM

## 2019-12-12 NOTE — TELEPHONE ENCOUNTER
Pt said he has had a work up with Dr. Blas re: shortness of breath and decreased O2 sats. Dr. Blas said the issue is not cardiac and recommends he see pulmonary.

## 2019-12-12 NOTE — TELEPHONE ENCOUNTER
Pt called and said he is going to see Dr. Jaspreet Quiroz he is a [employer] pulmonary specialist his number is [phone] fax#[phone]    He just needs a referral put in to Dr. Quiroz.

## 2019-12-19 ENCOUNTER — OFFICE VISIT (OUTPATIENT)
Dept: PULMONOLOGY | Facility: CLINIC | Age: 74
End: 2019-12-19

## 2019-12-19 VITALS
HEART RATE: 84 BPM | SYSTOLIC BLOOD PRESSURE: 118 MMHG | TEMPERATURE: 97.8 F | OXYGEN SATURATION: 90 % | DIASTOLIC BLOOD PRESSURE: 78 MMHG | RESPIRATION RATE: 16 BRPM | HEIGHT: 68 IN | BODY MASS INDEX: 26.54 KG/M2 | WEIGHT: 175.13 LBS

## 2019-12-19 DIAGNOSIS — Z78.9 NON-SMOKER: ICD-10-CM

## 2019-12-19 DIAGNOSIS — I10 ESSENTIAL HYPERTENSION: ICD-10-CM

## 2019-12-19 DIAGNOSIS — J30.2 SEASONAL AND PERENNIAL ALLERGIC RHINITIS: ICD-10-CM

## 2019-12-19 DIAGNOSIS — J30.89 SEASONAL AND PERENNIAL ALLERGIC RHINITIS: ICD-10-CM

## 2019-12-19 DIAGNOSIS — K21.9 GASTROESOPHAGEAL REFLUX DISEASE, ESOPHAGITIS PRESENCE NOT SPECIFIED: ICD-10-CM

## 2019-12-19 DIAGNOSIS — J44.9 CHRONIC ASTHMATIC BRONCHITIS (HCC): Primary | ICD-10-CM

## 2019-12-19 DIAGNOSIS — J45.909 IDIOPATHIC ASTHMA: ICD-10-CM

## 2019-12-19 LAB
ALBUMIN SERPL-MCNC: 3.6 G/DL (ref 3.5–5.2)
ALBUMIN/GLOB SERPL: 0.9 G/DL
ALP SERPL-CCNC: 70 U/L (ref 39–117)
ALT SERPL W P-5'-P-CCNC: 15 U/L (ref 1–41)
ANION GAP SERPL CALCULATED.3IONS-SCNC: 14.5 MMOL/L (ref 5–15)
AST SERPL-CCNC: 15 U/L (ref 1–40)
BASOPHILS # BLD AUTO: 0.09 10*3/MM3 (ref 0–0.2)
BASOPHILS NFR BLD AUTO: 0.7 % (ref 0–1.5)
BILIRUB SERPL-MCNC: 0.7 MG/DL (ref 0.2–1.2)
BUN BLD-MCNC: 20 MG/DL (ref 8–23)
BUN/CREAT SERPL: 19 (ref 7–25)
CALCIUM SPEC-SCNC: 9.3 MG/DL (ref 8.6–10.5)
CHLORIDE SERPL-SCNC: 99 MMOL/L (ref 98–107)
CO2 SERPL-SCNC: 29.5 MMOL/L (ref 22–29)
CREAT BLD-MCNC: 1.05 MG/DL (ref 0.76–1.27)
DEPRECATED RDW RBC AUTO: 39.6 FL (ref 37–54)
EOSINOPHIL # BLD AUTO: 1.09 10*3/MM3 (ref 0–0.4)
EOSINOPHIL NFR BLD AUTO: 9 % (ref 0.3–6.2)
ERYTHROCYTE [DISTWIDTH] IN BLOOD BY AUTOMATED COUNT: 12.8 % (ref 12.3–15.4)
GFR SERPL CREATININE-BSD FRML MDRD: 69 ML/MIN/1.73
GLOBULIN UR ELPH-MCNC: 3.8 GM/DL
GLUCOSE BLD-MCNC: 103 MG/DL (ref 65–99)
HCT VFR BLD AUTO: 43.1 % (ref 37.5–51)
HGB BLD-MCNC: 14.4 G/DL (ref 13–17.7)
IMM GRANULOCYTES # BLD AUTO: 0.03 10*3/MM3 (ref 0–0.05)
IMM GRANULOCYTES NFR BLD AUTO: 0.2 % (ref 0–0.5)
LYMPHOCYTES # BLD AUTO: 1.47 10*3/MM3 (ref 0.7–3.1)
LYMPHOCYTES NFR BLD AUTO: 12.1 % (ref 19.6–45.3)
MCH RBC QN AUTO: 28.2 PG (ref 26.6–33)
MCHC RBC AUTO-ENTMCNC: 33.4 G/DL (ref 31.5–35.7)
MCV RBC AUTO: 84.5 FL (ref 79–97)
MONOCYTES # BLD AUTO: 1.14 10*3/MM3 (ref 0.1–0.9)
MONOCYTES NFR BLD AUTO: 9.4 % (ref 5–12)
NEUTROPHILS # BLD AUTO: 8.29 10*3/MM3 (ref 1.7–7)
NEUTROPHILS NFR BLD AUTO: 68.6 % (ref 42.7–76)
NRBC BLD AUTO-RTO: 0 /100 WBC (ref 0–0.2)
PLATELET # BLD AUTO: 291 10*3/MM3 (ref 140–450)
PMV BLD AUTO: 10.1 FL (ref 6–12)
POTASSIUM BLD-SCNC: 3.6 MMOL/L (ref 3.5–5.2)
PROT SERPL-MCNC: 7.4 G/DL (ref 6–8.5)
RBC # BLD AUTO: 5.1 10*6/MM3 (ref 4.14–5.8)
SODIUM BLD-SCNC: 143 MMOL/L (ref 136–145)
WBC NRBC COR # BLD: 12.11 10*3/MM3 (ref 3.4–10.8)

## 2019-12-19 PROCEDURE — 86003 ALLG SPEC IGE CRUDE XTRC EA: CPT | Performed by: INTERNAL MEDICINE

## 2019-12-19 PROCEDURE — 86256 FLUORESCENT ANTIBODY TITER: CPT | Performed by: INTERNAL MEDICINE

## 2019-12-19 PROCEDURE — 83520 IMMUNOASSAY QUANT NOS NONAB: CPT | Performed by: INTERNAL MEDICINE

## 2019-12-19 PROCEDURE — 36415 COLL VENOUS BLD VENIPUNCTURE: CPT | Performed by: INTERNAL MEDICINE

## 2019-12-19 PROCEDURE — 86606 ASPERGILLUS ANTIBODY: CPT | Performed by: INTERNAL MEDICINE

## 2019-12-19 PROCEDURE — 82784 ASSAY IGA/IGD/IGG/IGM EACH: CPT | Performed by: INTERNAL MEDICINE

## 2019-12-19 PROCEDURE — 86612 BLASTOMYCES ANTIBODY: CPT | Performed by: INTERNAL MEDICINE

## 2019-12-19 PROCEDURE — 85025 COMPLETE CBC W/AUTO DIFF WBC: CPT | Performed by: INTERNAL MEDICINE

## 2019-12-19 PROCEDURE — 86698 HISTOPLASMA ANTIBODY: CPT | Performed by: INTERNAL MEDICINE

## 2019-12-19 PROCEDURE — 82787 IGG 1 2 3 OR 4 EACH: CPT | Performed by: INTERNAL MEDICINE

## 2019-12-19 PROCEDURE — 86038 ANTINUCLEAR ANTIBODIES: CPT | Performed by: INTERNAL MEDICINE

## 2019-12-19 PROCEDURE — 82785 ASSAY OF IGE: CPT | Performed by: INTERNAL MEDICINE

## 2019-12-19 PROCEDURE — 82103 ALPHA-1-ANTITRYPSIN TOTAL: CPT | Performed by: INTERNAL MEDICINE

## 2019-12-19 PROCEDURE — 99204 OFFICE O/P NEW MOD 45 MIN: CPT | Performed by: INTERNAL MEDICINE

## 2019-12-19 PROCEDURE — 82104 ALPHA-1-ANTITRYPSIN PHENO: CPT | Performed by: INTERNAL MEDICINE

## 2019-12-19 PROCEDURE — 80053 COMPREHEN METABOLIC PANEL: CPT | Performed by: INTERNAL MEDICINE

## 2019-12-20 DIAGNOSIS — J45.909 IDIOPATHIC ASTHMA: Primary | ICD-10-CM

## 2019-12-20 PROBLEM — K21.9 CHRONIC GERD: Status: ACTIVE | Noted: 2019-12-20

## 2019-12-20 PROBLEM — J30.2 SEASONAL AND PERENNIAL ALLERGIC RHINITIS: Status: ACTIVE | Noted: 2019-12-20

## 2019-12-20 PROBLEM — Z78.9 NON-SMOKER: Status: ACTIVE | Noted: 2019-12-20

## 2019-12-20 PROBLEM — J30.89 SEASONAL AND PERENNIAL ALLERGIC RHINITIS: Status: ACTIVE | Noted: 2019-12-20

## 2019-12-20 NOTE — PROGRESS NOTES
PULMONARY  NOTE    Chief Complaint     Chronic persistent asthma, dyspnea, hypertension, non-smoker, coronary artery disease, perennial rhinitis    History of Present Illness     74-year-old white male referred for evaluation of persistent respiratory symptoms.    He is a lifelong non-smoker.    He has a history of asthma that was originally diagnosed back in 1985.  He has been on combination therapy with Advair for many years.  He also has albuterol to use on a daily basis.    He feels that his dyspnea has slowly but progressively gotten worse over the last 2 years.  He has dyspnea on exertion, not at rest.  Now he would have difficulty going up more than 1 flight of stairs.  He does cardiac rehab on a regular basis.  Typically he will go about 15 minutes at 2 mph    He has a daily cough which is gotten worse over the last year.  He frequently will produce green sputum.  Is never had hemoptysis.    He tried Breo in the past but did not like the dry powder inhaler.  He is on Advair HFA with Ventolin on an as-needed basis.  He has not been on any recent steroids.    Patient Active Problem List   Diagnosis   • CAD (coronary artery disease)   • PAD (peripheral artery disease) (CMS/[facility])   • Essential hypertension   • Mixed hyperlipidemia   • Angina pectoris (CMS/HCC)   • Peripheral vascular disease (CMS/[facility])   • Edema of right lower extremity   • Annual physical exam   • Alcohol use   • Preoperative examination   • Sinus bradycardia, persistent   • Postphlebitic syndrome   • GERD   • Perennial rhinitis   • Chronic persistent asthma   • Non-smoker     No Known Allergies    Current Outpatient Medications:   •  aspirin 325 MG tablet, Take 325 mg by mouth Daily., Disp: , Rfl:   •  atorvastatin (LIPITOR) 20 MG tablet, Take 1 tablet by mouth every night at bedtime., Disp: 30 tablet, Rfl: 5  •  BYSTOLIC 10 MG tablet, TAKE 1 TABLET BY MOUTH EVERY DAY, Disp: 90 tablet, Rfl: 3  •  chlorthalidone (HYGROTON) 25 MG tablet, TAKE  "1/2 TABLET DAILY., Disp: 45 tablet, Rfl: 3  •  fexofenadine (ALLEGRA) 180 MG tablet, Take 180 mg by mouth Daily As Needed., Disp: , Rfl:   •  Fluticasone Furoate-Vilanterol (BREO ELLIPTA) 100-25 MCG/INH inhaler, Inhale 1 puff Daily., Disp: 1 inhaler, Rfl: 0  •  fluticasone-salmeterol (ADVAIR HFA) 115-21 MCG/ACT inhaler, Inhale 2 puffs 2 (Two) Times a Day., Disp: , Rfl:   •  lisinopril (PRINIVIL,ZESTRIL) 10 MG tablet, TAKE 1 TABLET BY MOUTH EVERY DAY, Disp: 90 tablet, Rfl: 3  •  omeprazole (priLOSEC) 20 MG capsule, Take 20 mg by mouth Daily., Disp: , Rfl:   •  Triamcinolone Acetonide (NASACORT) 55 MCG/ACT nasal inhaler, 2 sprays into the nostril(s) as directed by provider 2 (Two) Times a Day., Disp: , Rfl:   •  VENTOLIN  (90 Base) MCG/ACT inhaler, INHALE 2 PUFFS EVERY 4 HOURS AS NEEDED, Disp: 18 inhaler, Rfl: 5  MEDICATION LIST AND ALLERGIES REVIEWED.    Family History   Problem Relation Age of Onset   • Esophageal cancer Father         cause of death     Social History     Tobacco Use   • Smoking status: Never Smoker   • Smokeless tobacco: Never Used   Substance Use Topics   • Alcohol use: Yes     Frequency: Monthly or less     Drinks per session: 1 or 2     Binge frequency: Never     Comment: socially   • Drug use: No     Social History     Social History Narrative        Retired     Lifelong non-smoker    Drinks about 1 alcoholic beverage on a weekly basis     FAMILY AND SOCIAL HISTORY REVIEWED.    Review of Systems  ALSO REFER TO SCANNED ROS SHEET FROM SAME DATE.    /78   Pulse 84   Temp 97.8 °F (36.6 °C) (Temporal)   Resp 16   Ht 172.7 cm (68\")   Wt 79.4 kg (175 lb 2 oz)   SpO2 90%   BMI 26.63 kg/m²   Physical Exam   Constitutional: He is oriented to person, place, and time. He appears well-developed. No distress.   HENT:   Head: Normocephalic and atraumatic.   Neck: No thyromegaly present.   Cardiovascular: Normal rate, regular rhythm and normal heart sounds.   No " "murmur heard.  Pulmonary/Chest: Effort normal. No stridor.   Scattered rhonchi that clear partially, but not completely with cough   Abdominal: Soft. Bowel sounds are normal.   Musculoskeletal: Normal range of motion. He exhibits no edema.   Lymphadenopathy:     He has no cervical adenopathy.        Right: No supraclavicular and no epitrochlear adenopathy present.        Left: No supraclavicular and no epitrochlear adenopathy present.   Neurological: He is alert and oriented to person, place, and time.   Skin: Skin is warm and dry. He is not diaphoretic.   Psychiatric: He has a normal mood and affect. His behavior is normal.   Nursing note and vitals reviewed.      Results     Chest x-ray done on 11/6/2019 reviewed on PACS.  Prominent interstitial pattern with a \"dirty\" chest x-ray appearance    Spirometry done at the hospital on 11/6/2019 reveals severe airway obstruction without significant improvement following use of inhaled bronchodilator    Problem List       ICD-10-CM ICD-9-CM   1. Chronic persistent asthma J44.9 493.20   2. GERD K21.9 530.81   3. Essential hypertension I10 401.9   4. Seasonal and perennial allergic rhinitis J30.89 477.9    J30.2    5. Chronic persistent asthma J45.909 493.90   6. Non-smoker Z78.9 V49.89       Discussion     We discussed his test results.  He has severe obstructive airways disease.  This is presumably due to chronic persistent asthma.    However, given his exam, chest x-ray appearance, and symptoms, I am worried about other etiologies.  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  He does have an elevated eosinophil count.  He may have underlying bronchiectasis.    Were going to get a HRCT.  Also labs including alpha-1 antitrypsin screen and ANCA.    In the meantime, we will switch him to Symbicort 2 puffs twice a day with a spacer.  Consider adding Spiriva in the future.  Also, consider adding a biologic agent, such as Nucala, in the future based on lab results.    I would like to put him on a " steroid trial.  Number to put him on 40 mg prednisone today for 2 weeks and we will follow-up on spirometry in 2 weeks.  We discussed potential long-term and short-term side effects of steroid therapy.    Therefore, we will see him back in roughly 2 weeks with repeat spirometry after the above    Jaspreet Quiroz MD  Note electronically signed    CC: Simon Easley MD

## 2019-12-23 LAB
ANA SER QL: NEGATIVE
IGA SERPL-MCNC: 280 MG/DL (ref 61–437)
IGG SERPL-MCNC: 1172 MG/DL (ref 700–1600)
IGM SERPL-MCNC: 39 MG/DL (ref 15–143)
TOTAL IGE SMQN RAST: 382 IU/ML (ref 6–495)

## 2019-12-24 LAB
A FLAVUS AB SER QL ID: NEGATIVE
A FUMIGATUS AB SER QL ID: NEGATIVE
A NIGER AB SER QL ID: NEGATIVE
B DERMAT AB TITR SER: NEGATIVE {TITER}
C-ANCA TITR SER IF: ABNORMAL TITER
H CAPSUL AB TITR SER ID: NEGATIVE {TITER}
IGG SERPL-MCNC: 1181 MG/DL (ref 700–1600)
IGG1 SER-MCNC: 548 MG/DL (ref 248–810)
IGG2 SER-MCNC: 501 MG/DL (ref 130–555)
IGG3 SER-MCNC: 92 MG/DL (ref 15–102)
IGG4 SER-MCNC: 243 MG/DL (ref 2–96)
MYELOPEROXIDASE AB SER-ACNC: 9.5 U/ML (ref 0–9)
P-ANCA ATYPICAL TITR SER IF: ABNORMAL TITER
P-ANCA TITR SER IF: ABNORMAL TITER
PROTEINASE3 AB SER IA-ACNC: <3.5 U/ML (ref 0–3.5)
TOTAL IGE SMQN RAST: 381 IU/ML (ref 6–495)

## 2019-12-27 LAB
A ALTERNATA IGE QN: 5.91 KU/L
A FUMIGATUS IGE QN: 16.5 KU/L
A1AT SERPL-MCNC: 171 MG/DL (ref 101–187)
AMER ROACH IGE QN: <0.1 KU/L
BAHIA GRASS IGE QN: <0.1 KU/L
BERMUDA GRASS IGE QN: <0.1 KU/L
BOXELDER IGE QN: 0.14 KU/L
C HERBARUM IGE QN: 0.71 KU/L
CAT DANDER IGG QN: <0.1 KU/L
CMN PIGWEED IGE QN: 0.32 KU/L
COMMON RAGWEED IGE QN: 0.74 KU/L
CONV CLASS DESCRIPTION: ABNORMAL
D FARINAE IGE QN: 0.45 KU/L
D PTERONYSS IGE QN: 0.41 KU/L
DOG DANDER IGE QN: <0.1 KU/L
ENGL PLANTAIN IGE QN: 0.11 KU/L
HAZELNUT POLN IGE QN: ABNORMAL
JOHNSON GRASS IGE QN: <0.1 KU/L
KENT BLUE GRASS IGE QN: <0.1 KU/L
M RACEMOSUS IGE QN: ABNORMAL
MT JUNIPER IGE QN: 0.16 KU/L
MUGWORT IGE QN: 0.21 KU/L
NETTLE IGE QN: ABNORMAL
P NOTATUM IGE QN: 3.43 KU/L
PHENOTYPE: NORMAL
S BOTRYOSUM IGE QN: ABNORMAL
SHEEP SORREL IGE QN: ABNORMAL
SWEET GUM IGE QN: ABNORMAL
T011-IGE MAPLE LEAF SYCAMORE: ABNORMAL
WHITE ELM IGE QN: 0.15 KU/L
WHITE HICKORY IGE QN: ABNORMAL
WHITE MULBERRY IGE QN: ABNORMAL
WHITE OAK IGE QN: 0.11 KU/L

## 2020-01-02 ENCOUNTER — HOSPITAL ENCOUNTER (OUTPATIENT)
Dept: CT IMAGING | Facility: HOSPITAL | Age: 75
Discharge: HOME | End: 2020-01-02
Admitting: INTERNAL MEDICINE

## 2020-01-02 DIAGNOSIS — J45.909 IDIOPATHIC ASTHMA: ICD-10-CM

## 2020-01-02 PROCEDURE — 71250 CT THORAX DX C-: CPT

## 2020-01-02 RX ORDER — BUDESONIDE AND FORMOTEROL FUMARATE DIHYDRATE 160; 4.5 UG/1; UG/1
2 AEROSOL RESPIRATORY (INHALATION)
Refills: 12
Start: 2020-01-02 | End: 2020-02-06

## 2020-01-06 ENCOUNTER — OFFICE VISIT (OUTPATIENT)
Dept: PULMONOLOGY | Facility: CLINIC | Age: 75
End: 2020-01-06

## 2020-01-06 VITALS
BODY MASS INDEX: 27.47 KG/M2 | WEIGHT: 181.25 LBS | DIASTOLIC BLOOD PRESSURE: 80 MMHG | OXYGEN SATURATION: 93 % | SYSTOLIC BLOOD PRESSURE: 130 MMHG | TEMPERATURE: 97.8 F | RESPIRATION RATE: 16 BRPM | HEART RATE: 73 BPM | HEIGHT: 68 IN

## 2020-01-06 DIAGNOSIS — J45.909 IDIOPATHIC ASTHMA: Primary | ICD-10-CM

## 2020-01-06 DIAGNOSIS — R91.1 PULMONARY NODULE: ICD-10-CM

## 2020-01-06 DIAGNOSIS — J30.2 SEASONAL AND PERENNIAL ALLERGIC RHINITIS: ICD-10-CM

## 2020-01-06 DIAGNOSIS — R76.8 P-ANCA AND MPO ANTIBODIES POSITIVE: ICD-10-CM

## 2020-01-06 DIAGNOSIS — J30.89 SEASONAL AND PERENNIAL ALLERGIC RHINITIS: ICD-10-CM

## 2020-01-06 DIAGNOSIS — K21.9 GASTROESOPHAGEAL REFLUX DISEASE, ESOPHAGITIS PRESENCE NOT SPECIFIED: ICD-10-CM

## 2020-01-06 DIAGNOSIS — J47.9 BRONCHIECTASIS WITHOUT ACUTE EXACERBATION (HCC): ICD-10-CM

## 2020-01-06 PROCEDURE — 94060 EVALUATION OF WHEEZING: CPT | Performed by: NURSE PRACTITIONER

## 2020-01-06 PROCEDURE — 99214 OFFICE O/P EST MOD 30 MIN: CPT | Performed by: NURSE PRACTITIONER

## 2020-01-06 PROCEDURE — 94726 PLETHYSMOGRAPHY LUNG VOLUMES: CPT | Performed by: NURSE PRACTITIONER

## 2020-01-06 PROCEDURE — 94729 DIFFUSING CAPACITY: CPT | Performed by: NURSE PRACTITIONER

## 2020-01-06 RX ORDER — BUDESONIDE AND FORMOTEROL FUMARATE DIHYDRATE 160; 4.5 UG/1; UG/1
2 AEROSOL RESPIRATORY (INHALATION)
Qty: 1 INHALER | Refills: 11 | Status: SHIPPED | OUTPATIENT
Start: 2020-01-06 | End: 2020-12-17 | Stop reason: SDUPTHER

## 2020-01-06 RX ORDER — PREDNISONE 10 MG/1
10 TABLET ORAL 2 TIMES DAILY
COMMUNITY
Start: 2019-12-19 | End: 2020-02-06

## 2020-01-06 RX ORDER — ALBUTEROL SULFATE 90 UG/1
4 AEROSOL, METERED RESPIRATORY (INHALATION) ONCE
Status: COMPLETED | OUTPATIENT
Start: 2020-01-06 | End: 2020-01-06

## 2020-01-06 RX ADMIN — ALBUTEROL SULFATE 4 PUFF: 90 AEROSOL, METERED RESPIRATORY (INHALATION) at 09:37

## 2020-01-06 NOTE — PROGRESS NOTES
[facility] Pulmonary Follow Up Note    Chief Complaint:  Chief Complaint   Patient presents with   • Chronic Persistent Asthma     f/u       History of Present Illness:  Uzair Jacob is a 74 y.o.male here today for Chronic Persistent Asthma (f/u) with testing. He was last seen in the office on 12/19/19 by Dr Blayne Quiroz for referral by his PCP for evaluation of persistent respiratory symptoms      He is a lifelong non-smoker.     He has a history of asthma that was originally diagnosed back in 1985. He has been on combination therapy with Advair as well as albuterol as needed. Dr Quiroz switched him to another ICS/LABA which he has been using since his last visit. He feels that he had similar results with Symbicort 160 as previous meds, both Advair and Breo. He has been using a spacer without any c/o oral candidosis.    Dr Quiroz had also placed him on a 2 week course of steroids with some improvement of his acute symptomology. He was on a recent course of steroid per his PCP 2 months ago.      His dyspnea has progressively gotten worse over the last 2 years and is primarily on exertion. He does participate in cardiac rehab on a regular basis.They had noticed a decrease in his baseline oxygen saturation, which is one of the reasons that prompted his referral to our office.     He has a daily persistent productive cough, which has gotten worse over the last year. He frequently will produce light to dark green sputum. It will be clear on occasion. He denies any hemoptysis, fever, or chills.     I have reviewed the patients past medical, past surgical and family history as noted in Epic.     Subjective     Social History     Socioeconomic History   • Marital status:      Spouse name: Not on file   • Number of children: Not on file   • Years of education: Not on file   • Highest education level: Not on file   Tobacco Use   • Smoking status: Never Smoker   • Smokeless tobacco: Never Used    Substance and Sexual Activity   • Alcohol use: Yes     Frequency: Monthly or less     Drinks per session: 1 or 2     Binge frequency: Never     Comment: socially   • Drug use: No   • Sexual activity: Defer   Social History Narrative        Retired     Lifelong non-smoker    Drinks about 1 alcoholic beverage on a weekly basis         Current Outpatient Medications:   •  aspirin 325 MG tablet, Take 325 mg by mouth Daily., Disp: , Rfl:   •  atorvastatin (LIPITOR) 20 MG tablet, Take 1 tablet by mouth every night at bedtime., Disp: 30 tablet, Rfl: 5  •  budesonide-formoterol (SYMBICORT) 160-4.5 MCG/ACT inhaler, Inhale 2 puffs 2 (Two) Times a Day., Disp: , Rfl: 12  •  BYSTOLIC 10 MG tablet, TAKE 1 TABLET BY MOUTH EVERY DAY, Disp: 90 tablet, Rfl: 3  •  chlorthalidone (HYGROTON) 25 MG tablet, TAKE 1/2 TABLET DAILY., Disp: 45 tablet, Rfl: 3  •  fexofenadine (ALLEGRA) 180 MG tablet, Take 180 mg by mouth Daily As Needed., Disp: , Rfl:   •  lisinopril (PRINIVIL,ZESTRIL) 10 MG tablet, TAKE 1 TABLET BY MOUTH EVERY DAY, Disp: 90 tablet, Rfl: 3  •  omeprazole (priLOSEC) 20 MG capsule, Take 20 mg by mouth Daily., Disp: , Rfl:   •  predniSONE (DELTASONE) 10 MG tablet, Take 10 mg by mouth 2 (Two) Times a Day., Disp: , Rfl:   •  Triamcinolone Acetonide (NASACORT) 55 MCG/ACT nasal inhaler, 2 sprays into the nostril(s) as directed by provider 2 (Two) Times a Day., Disp: , Rfl:   •  VENTOLIN  (90 Base) MCG/ACT inhaler, INHALE 2 PUFFS EVERY 4 HOURS AS NEEDED, Disp: 18 inhaler, Rfl: 5  •  budesonide-formoterol (SYMBICORT) 160-4.5 MCG/ACT inhaler, Inhale 2 puffs 2 (Two) Times a Day., Disp: 1 inhaler, Rfl: 11  No current facility-administered medications for this visit.     No Known Allergies    Immunization History   Administered Date(s) Administered   • FLUAD TRI 65YR+ 10/21/2019   • Flu Vaccine Quad PF >18YRS 10/30/2015   • Fluzone High Dose =>65 Years ([employer] ONLY) 10/01/2014, 12/14/2016, 11/30/2017,  "10/01/2018   • Hepatitis A 11/06/2018, 04/06/2019   • Pneumococcal Conjugate 13-Valent (PCV13) 12/17/2014   • Pneumococcal Polysaccharide (PPSV23) 06/25/2013   • Pneumococcal, Unspecified 06/25/2013   • Tdap 06/25/2013   • Zostavax 06/20/2012       Review of Systems:    Review of Systems   Constitutional: Negative for chills, fatigue and fever.   HENT: Positive for congestion, rhinorrhea and sinus pressure. Negative for trouble swallowing and voice change.    Eyes: Negative for blurred vision and visual disturbance.   Respiratory: Positive for cough, shortness of breath and wheezing. Negative for apnea and chest tightness.    Cardiovascular: Negative for chest pain, palpitations and leg swelling.   Gastrointestinal: Negative for abdominal pain, nausea and vomiting.   Endocrine: Negative for cold intolerance and heat intolerance.   Genitourinary: Negative for dysuria and hematuria.   Musculoskeletal: Negative for arthralgias and joint swelling.   Skin: Negative for rash and bruise.   Allergic/Immunologic: Positive for environmental allergies. Negative for food allergies and immunocompromised state.   Neurological: Negative for dizziness, speech difficulty, weakness and headache.   Hematological: Negative for adenopathy. Does not bruise/bleed easily.   Psychiatric/Behavioral: Negative for dysphoric mood. The patient is not nervous/anxious.        Objective     Vital Signs:  Vitals:    01/06/20 0941   BP: 130/80   BP Location: Left arm   Patient Position: Sitting   Cuff Size: Adult   Pulse: 73   Resp: 16   Temp: 97.8 °F (36.6 °C)   SpO2: 93%  Comment: room air at rest   Weight: 82.2 kg (181 lb 4 oz)   Height: 172.7 cm (68\")       Physical Exam:    Physical Exam   Constitutional: He is oriented to person, place, and time. He appears well-developed and well-nourished.   HENT:   Head: Normocephalic and atraumatic.   Mouth/Throat: Oropharynx is clear and moist.   Eyes: Pupils are equal, round, and reactive to light. EOM " are normal.   Neck: Normal range of motion. Neck supple.   Cardiovascular: Normal rate and regular rhythm.   Pulmonary/Chest: Effort normal. He has rhonchi (scattered, clears with cough).   Abdominal: Soft. Bowel sounds are normal.   Musculoskeletal: Normal range of motion.   Neurological: He is alert and oriented to person, place, and time.   Skin: Skin is warm and dry.   Psychiatric: He has a normal mood and affect. Judgment normal.   Nursing note and vitals reviewed.      Results Review:    I reviewed the patient's new clinical results.    Ct Chest Hi Resolution    Result Date: 1/2/2020  1.  This is an abnormal high-resolution CT scan of the lungs which demonstrates multifocal bronchiectasis with bronchial wall thickening with additional peribronchial micronodularity and scattered groundglass changes of the lungs which are nonspecific but can be seen within the setting of chronic hypersensitivity pneumonitis, sarcoidosis, eosinophilic pneumonia, or other inflammatory/infectious airway conditions. If there remains clinical uncertainty, consider further evaluation with bronchoscopy.    2.  There are scattered sub centimeter pulmonary nodules as described above which are incompletely evaluated secondary to the high-resolution nature of the CT exam. Specifically, identified within the anterior right upper lobe is a 1.1 cm pulmonary nodule with surrounding groundglass changes which require continued evaluation. Ultimately follow-up standard CT of the chest with IV contrast is advised for more definitive evaluation of these underlying pulmonary nodules.  D:  01/02/2020 E:  01/02/2020  This report was finalized on 1/2/2020 3:17 PM by Dr. Ha Sprague MD.        Spirometry Interpretation (@TD):   PFT'S in the office today, read by me.    1. Severe airway obstruction. (FEV1 35-49% pred.).  2. The maximum voluntary ventilation (MVV) is reduced.  3. Pre-Bronchodilators FEV = 1.07 CL. (37 % of predicted)  4. Following  the inhalation of a bronchodilator, there is NO significant change in airway obstruction.  The lack of a bronchodilator response in the laboratory does not preclude a clinical response to bronchodilator therapy.  5. Lung volumes are normal. and The elevated Residual Volume is consistent with Air Trapping.  6. The diffusing capacity is normal.    Assessment/Plan   Assessment / Plan:    Problem List Items Addressed This Visit        Respiratory    Perennial rhinitis    Chronic persistent asthma - Primary    Relevant Medications    albuterol sulfate HFA (PROVENTIL HFA;VENTOLIN HFA;PROAIR HFA) inhaler 4 puff (Completed)    budesonide-formoterol (SYMBICORT) 160-4.5 MCG/ACT inhaler    Other Relevant Orders    Pulmonary Function Test (Completed)      Other Visit Diagnoses     Bronchiectasis without acute exacerbation (CMS/HCC)        Relevant Medications    budesonide-formoterol (SYMBICORT) 160-4.5 MCG/ACT inhaler    P-ANCA and MPO antibodies positive        Pulmonary nodule        GERD              Discussion:    We discussed his test results in detail today. Repeat PFT in our office revealed severe obstructive airway disease, similar to preveious spirometry, presumably due to chronic persistent asthma. No restriction, but RV elevated consistent with air trapping. Diffusion normal.     Dr Quiroz was concerned about other possible etiologies based on his chest x-ray appearance and symptoms. Immunoglobulins, IgE, fungal AB, PEG were all WNL. Alpha 1 Antitrypsin was normal with phenotype MM. Allergy testing resulted in multiple triggers.     CBC revealed elevated eosinophils with mildly elevated WBC. IgG SC 4, P-ANCA and MPO antibodies were positive.  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 We discussed this prognosis. We also discussed adding a biologic agent. Patient is agreeable. We will start the process of initiating Nucala today.      We also reviewed his HRCT chest today. Scan revealed multifocal bronchiectasis with  bronchial wall thickening with additional peribronchial micronodularity and scattered groundglass changes which are nonspecific. These can be seen with hypersensitivity pneumonitis, sarcoidosis, eosinophilic pneumonia, or other inflammatory/infectious airway conditions. We discussed the possibility of further evaluation with bronchoscopy. We discussed the reasons for the procedure, what it entails, as well as risks vs benefits. I will discuss CT findings with Dr Quiroz next week to see if he wishes to pursue bronchoscopy. Wife and patient are agreeable with this plan.    HRCT also revealed scattered sub centimeter pulmonary nodules which are incompletely evaluated secondary to the high-resolution. Specifically, identified within the anterior right upper lobe is a 1.1 cm pulmonary nodule with surrounding groundglass changes which require continued evaluation. Ultimately follow-up standard CT of the chest with IV contrast is advised. I will see if Dr Quiroz wants a FU CT before or after possible bronch or a short interval CT to ensure stability. I will call patient early next week after I speak with him.     He will continue Symbicort 2 puffs twice a day with a spacer. I will send in a prescription today. He will also continue Ventolin as needed. May consider adding Spiriva in the future.     He also completed a steroid trial of 40 mg prednisone daily for 2 weeks. No significant change in spirometry post trial, although patients symptoms improved slightly. We discussed potential long-term and short-term side effects of steroid therapy. Hopefully patient will see results after the addition of biologic agent.      The plan will be for patient to FU in the clinic with Dr Quiroz in 1-2 months, depending on route future testing takes.     Plan of care was reviewed with the patient and wife at the conclusion of today's visit. Education was provided regarding diagnosis, management, and any prescribed or recommended  over the counter medications. Patient verbalizes understanding of and agreement with management plan.     I spent 25 minutes with the patient and wife. I spent > 50% percent of this time counseling and discussing diagnosis, prognosis, diagnostic testing, evaluation, current status, treatment options and management.    CATERINA Rutledge  Electronically signed     Please note that portions of this note were completed with a voice recognition program. Efforts were made to edit the dictations, but occasionally words are mistranscribed.

## 2020-01-13 ENCOUNTER — TELEPHONE (OUTPATIENT)
Dept: PULMONOLOGY | Facility: CLINIC | Age: 75
End: 2020-01-13

## 2020-01-13 ENCOUNTER — DOCUMENTATION (OUTPATIENT)
Dept: PULMONOLOGY | Facility: CLINIC | Age: 75
End: 2020-01-13

## 2020-01-13 DIAGNOSIS — R91.8 ABNORMAL CT SCAN OF LUNG: ICD-10-CM

## 2020-01-13 DIAGNOSIS — R91.8 MULTIPLE PULMONARY NODULES: Primary | ICD-10-CM

## 2020-01-13 NOTE — TELEPHONE ENCOUNTER
Called patient with Dr Blayne Quiroz's recommendations. Will continue with initiating of Nucala. Will order a short interval CT Chest to follow up abnormal findings and schedule an apt with Dr Quiroz post scan.

## 2020-01-13 NOTE — PROGRESS NOTES
Patient underwent a high-resolution CT scan of the chest and lab evaluation.    He has bilateral bronchiectasis and an approximately 1 cm right upper lobe nodule with surrounding groundglass changes.  Labs reveal a positive atypical ANCA and high eosinophil count.    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  The right apical density is not amenable to a percutaneous needle biopsy and I think it is unlikely that I would be able to safely obtain biopsies with any likelihood using navigational bronchoscopy.    At this point, I recommend initiating EGPA therapy with Nucala and pursue a short interval follow-up CT scan of the chest in 3 months.

## 2020-01-23 ENCOUNTER — DOCUMENTATION (OUTPATIENT)
Dept: PULMONOLOGY | Facility: CLINIC | Age: 75
End: 2020-01-23

## 2020-01-23 NOTE — PROGRESS NOTES
Nucala approved thru medical benefits and [employer] scheduled patient for Riky nolen spoke with daniel and faxed order  Appointment is Feb. 6 th at 9:30 am      Patient notified and voiced understanding

## 2020-01-31 DIAGNOSIS — J45.909 IDIOPATHIC ASTHMA: Primary | ICD-10-CM

## 2020-01-31 RX ORDER — ALBUTEROL SULFATE 90 UG/1
2 AEROSOL, METERED RESPIRATORY (INHALATION) EVERY 4 HOURS PRN
Qty: 18 INHALER | Refills: 5 | Status: SHIPPED | OUTPATIENT
Start: 2020-01-31 | End: 2022-07-13 | Stop reason: SDUPTHER

## 2020-01-31 RX ORDER — EPINEPHRINE 0.3 MG/.3ML
0.3 INJECTION SUBCUTANEOUS ONCE
Qty: 1 EACH | Refills: 0 | Status: SHIPPED | OUTPATIENT
Start: 2020-01-31 | End: 2020-01-31

## 2020-01-31 NOTE — TELEPHONE ENCOUNTER
E-RX  Epipen 0.3mg to have on hand with his Nucala injection      E-Rx  Ventolin 2 puffs qid as needed   # 1 with 5 refills  Per patient request

## 2020-02-05 PROBLEM — J45.50 SEVERE PERSISTENT ASTHMA: Status: ACTIVE | Noted: 2020-02-05

## 2020-02-06 ENCOUNTER — INFUSION (OUTPATIENT)
Dept: ONCOLOGY | Facility: HOSPITAL | Age: 75
End: 2020-02-06

## 2020-02-06 VITALS
TEMPERATURE: 98 F | BODY MASS INDEX: 26.46 KG/M2 | SYSTOLIC BLOOD PRESSURE: 132 MMHG | DIASTOLIC BLOOD PRESSURE: 83 MMHG | HEART RATE: 96 BPM | RESPIRATION RATE: 18 BRPM | WEIGHT: 174 LBS

## 2020-02-06 DIAGNOSIS — J45.50 SEVERE PERSISTENT ASTHMA, UNSPECIFIED WHETHER COMPLICATED: Primary | ICD-10-CM

## 2020-02-06 PROCEDURE — 25010000002 MEPOLIZUMAB 100 MG RECONSTITUTED SOLUTION: Performed by: NURSE PRACTITIONER

## 2020-02-06 PROCEDURE — 96372 THER/PROPH/DIAG INJ SC/IM: CPT

## 2020-02-06 RX ADMIN — MEPOLIZUMAB 100 MG: 100 INJECTION, POWDER, FOR SOLUTION SUBCUTANEOUS at 09:00

## 2020-03-03 RX ORDER — CHLORTHALIDONE 25 MG/1
TABLET ORAL
Qty: 45 TABLET | Refills: 3 | Status: SHIPPED | OUTPATIENT
Start: 2020-03-03 | End: 2021-03-18

## 2020-03-05 ENCOUNTER — INFUSION (OUTPATIENT)
Dept: ONCOLOGY | Facility: HOSPITAL | Age: 75
End: 2020-03-05

## 2020-03-05 VITALS
HEART RATE: 80 BPM | TEMPERATURE: 97.7 F | SYSTOLIC BLOOD PRESSURE: 157 MMHG | RESPIRATION RATE: 14 BRPM | DIASTOLIC BLOOD PRESSURE: 86 MMHG

## 2020-03-05 DIAGNOSIS — J45.50 SEVERE PERSISTENT ASTHMA, UNSPECIFIED WHETHER COMPLICATED: Primary | ICD-10-CM

## 2020-03-05 PROCEDURE — 96372 THER/PROPH/DIAG INJ SC/IM: CPT

## 2020-03-05 PROCEDURE — 25010000002 MEPOLIZUMAB 100 MG RECONSTITUTED SOLUTION: Performed by: NURSE PRACTITIONER

## 2020-03-05 RX ADMIN — MEPOLIZUMAB 100 MG: 100 INJECTION, POWDER, FOR SOLUTION SUBCUTANEOUS at 09:13

## 2020-04-02 ENCOUNTER — INFUSION (OUTPATIENT)
Dept: ONCOLOGY | Facility: HOSPITAL | Age: 75
End: 2020-04-02

## 2020-04-02 VITALS
SYSTOLIC BLOOD PRESSURE: 127 MMHG | TEMPERATURE: 98.2 F | HEART RATE: 74 BPM | RESPIRATION RATE: 16 BRPM | DIASTOLIC BLOOD PRESSURE: 73 MMHG

## 2020-04-02 DIAGNOSIS — J45.50 SEVERE PERSISTENT ASTHMA, UNSPECIFIED WHETHER COMPLICATED: Primary | ICD-10-CM

## 2020-04-02 PROCEDURE — 96372 THER/PROPH/DIAG INJ SC/IM: CPT

## 2020-04-02 PROCEDURE — 25010000002 MEPOLIZUMAB 100 MG RECONSTITUTED SOLUTION: Performed by: NURSE PRACTITIONER

## 2020-04-02 RX ADMIN — MEPOLIZUMAB 100 MG: 100 INJECTION, POWDER, FOR SOLUTION SUBCUTANEOUS at 09:03

## 2020-04-07 ENCOUNTER — HOSPITAL ENCOUNTER (OUTPATIENT)
Dept: CT IMAGING | Facility: HOSPITAL | Age: 75
Discharge: HOME | End: 2020-04-07
Admitting: NURSE PRACTITIONER

## 2020-04-07 DIAGNOSIS — R91.8 ABNORMAL CT SCAN OF LUNG: ICD-10-CM

## 2020-04-07 DIAGNOSIS — R91.8 MULTIPLE PULMONARY NODULES: ICD-10-CM

## 2020-04-07 LAB — CREAT BLDA-MCNC: 1.2 MG/DL (ref 0.6–1.3)

## 2020-04-07 PROCEDURE — 71270 CT THORAX DX C-/C+: CPT

## 2020-04-07 PROCEDURE — 25010000002 IOPAMIDOL 61 % SOLUTION: Performed by: NURSE PRACTITIONER

## 2020-04-07 PROCEDURE — 82565 ASSAY OF CREATININE: CPT

## 2020-04-07 RX ADMIN — IOPAMIDOL 60 ML: 612 INJECTION, SOLUTION INTRAVENOUS at 11:17

## 2020-04-09 ENCOUNTER — APPOINTMENT (OUTPATIENT)
Dept: CT IMAGING | Facility: HOSPITAL | Age: 75
End: 2020-04-09

## 2020-04-10 ENCOUNTER — TELEMEDICINE (OUTPATIENT)
Dept: PULMONOLOGY | Facility: CLINIC | Age: 75
End: 2020-04-10

## 2020-04-10 DIAGNOSIS — R91.8 ABNORMAL CT SCAN OF LUNG: ICD-10-CM

## 2020-04-10 DIAGNOSIS — J30.2 SEASONAL AND PERENNIAL ALLERGIC RHINITIS: ICD-10-CM

## 2020-04-10 DIAGNOSIS — D72.18 ALLERGIC GRANULOMATOSIS OF CHURG-STRAUSS (HCC): Primary | ICD-10-CM

## 2020-04-10 DIAGNOSIS — R76.8 P-ANCA AND MPO ANTIBODIES POSITIVE: ICD-10-CM

## 2020-04-10 DIAGNOSIS — R91.8 MULTIPLE PULMONARY NODULES: ICD-10-CM

## 2020-04-10 DIAGNOSIS — J45.909 IDIOPATHIC ASTHMA: ICD-10-CM

## 2020-04-10 DIAGNOSIS — J30.89 SEASONAL AND PERENNIAL ALLERGIC RHINITIS: ICD-10-CM

## 2020-04-10 DIAGNOSIS — K21.9 GASTROESOPHAGEAL REFLUX DISEASE, ESOPHAGITIS PRESENCE NOT SPECIFIED: ICD-10-CM

## 2020-04-10 DIAGNOSIS — M30.1 ALLERGIC GRANULOMATOSIS OF CHURG-STRAUSS (HCC): Primary | ICD-10-CM

## 2020-04-10 PROCEDURE — 99214 OFFICE O/P EST MOD 30 MIN: CPT | Performed by: INTERNAL MEDICINE

## 2020-04-10 NOTE — PROGRESS NOTES
[facility] PULMONARY  VIDEO VISIT NOTE    Chief Complaint     Chronic persistent asthma, positive ANCA, eosinophilia, probable EGPA  Consent for Video Visit was obtained via Satori Brandst.  History of Present Illness     74-year-old white male last saw in the office on 12/19/2019.    He is a lifelong non-smoker.    He has a long history of asthma and had severe obstructive airways disease on prior spirometry  Labs were significant for elevated eosinophil count and positive atypical ANCA.  I felt that his clinical picture was most consistent with EGPA and he was started on Nucala.  He is been given 3 Nucala doses.  He initially had what he describes as dramatic improvement in symptoms, less so on subsequent injections.  However, overall he feels that his symptoms are better.    He remains on Symbicort with albuterol on an as-needed basis.    He had been doing cardiac rehab until the rehab program was closed due to the virus situation.    He denies chest pain or palpitations.    He typically has sinus drainage and postnasal drip.  Usually he takes OTC medications.    Patient Active Problem List   Diagnosis   • CAD (coronary artery disease)   • PAD (peripheral artery disease) (CMS/[facility])   • Essential hypertension   • Mixed hyperlipidemia   • Angina pectoris (CMS/[facility])   • Peripheral vascular disease (CMS/[facility])   • Edema of right lower extremity   • Annual physical exam   • Alcohol use   • Preoperative examination   • Sinus bradycardia, persistent   • Postphlebitic syndrome   • GERD   • Perennial rhinitis   • Chronic persistent asthma   • Non-smoker   • Severe persistent asthma     No Known Allergies    Current Outpatient Medications:   •  albuterol sulfate HFA (VENTOLIN HFA) 108 (90 Base) MCG/ACT inhaler, Inhale 2 puffs Every 4 (Four) Hours As Needed for Wheezing., Disp: 18 inhaler, Rfl: 5  •  aspirin 325 MG tablet, Take 325 mg by mouth Daily., Disp: , Rfl:   •  atorvastatin (LIPITOR) 20 MG tablet, Take 1 tablet by mouth every  night at bedtime., Disp: 30 tablet, Rfl: 5  •  budesonide-formoterol (SYMBICORT) 160-4.5 MCG/ACT inhaler, Inhale 2 puffs 2 (Two) Times a Day., Disp: 1 inhaler, Rfl: 11  •  BYSTOLIC 10 MG tablet, TAKE 1 TABLET BY MOUTH EVERY DAY, Disp: 90 tablet, Rfl: 3  •  chlorthalidone (HYGROTON) 25 MG tablet, TAKE 1/2 TABLET DAILY., Disp: 45 tablet, Rfl: 3  •  fexofenadine (ALLEGRA) 180 MG tablet, Take 180 mg by mouth Daily As Needed., Disp: , Rfl:   •  lisinopril (PRINIVIL,ZESTRIL) 10 MG tablet, TAKE 1 TABLET BY MOUTH EVERY DAY, Disp: 90 tablet, Rfl: 3  •  omeprazole (priLOSEC) 20 MG capsule, Take 20 mg by mouth Daily., Disp: , Rfl:   •  Triamcinolone Acetonide (NASACORT) 55 MCG/ACT nasal inhaler, 2 sprays into the nostril(s) as directed by provider 2 (Two) Times a Day., Disp: , Rfl:   MEDICATION LIST AND ALLERGIES REVIEWED.    Family History   Problem Relation Age of Onset   • Esophageal cancer Father         cause of death     Social History     Tobacco Use   • Smoking status: Never Smoker   • Smokeless tobacco: Never Used   Substance Use Topics   • Alcohol use: Yes     Frequency: Monthly or less     Drinks per session: 1 or 2     Binge frequency: Never     Comment: socially   • Drug use: No     Social History     Social History Narrative        Retired     Lifelong non-smoker    Drinks about 1 alcoholic beverage on a weekly basis     FAMILY AND SOCIAL HISTORY REVIEWED.    Review of Systems   Constitutional: Negative.  Negative for fatigue and unexpected weight change.   HENT: Negative for postnasal drip and rhinorrhea.    Respiratory: Positive for shortness of breath and wheezing. Negative for cough.    Cardiovascular: Negative.  Negative for chest pain, palpitations and leg swelling.   Gastrointestinal: Negative for abdominal distention and nausea.   Musculoskeletal: Negative.  Negative for arthralgias and joint swelling.   Skin: Negative.  Negative for rash and wound.   Allergic/Immunologic:  Negative.  Negative for immunocompromised state.   Hematological: Negative.  Negative for adenopathy.   Psychiatric/Behavioral: Negative.  Negative for sleep disturbance.     ALSO REFER TO SCANNED ROS SHEET FROM SAME DATE.    There were no vitals taken for this visit.  Physical Exam   Constitutional: He is oriented to person, place, and time. He appears well-developed and well-nourished. No distress.   HENT:   Head: Normocephalic and atraumatic.   Eyes: Conjunctivae are normal. Right eye exhibits no discharge. Left eye exhibits no discharge.   Pulmonary/Chest: Effort normal.   Musculoskeletal: He exhibits no edema.   Neurological: He is alert and oriented to person, place, and time.   Skin: No rash noted. He is not diaphoretic. No erythema.   Psychiatric: He has a normal mood and affect. His behavior is normal. Judgment and thought content normal.       Results     Most recent CT scan of the chest from 4/7/2020 reviewed on PACS as well as prior CT scan and HRCT.  New left upper lobe density noted    Problem List       ICD-10-CM ICD-9-CM   1. 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) M30.1 446.4   2. Chronic persistent asthma J45.909 493.90   3. Abnormal CT scan of lung R91.8 793.19   4. Multiple pulmonary nodules R91.8 793.19   5. P-ANCA and MPO antibodies positive R76.8 795.79   6. Seasonal and perennial allergic rhinitis J30.89 477.9    J30.2    7. GERD K21.9 530.81       Discussion     We reviewed his most recent CT scan of the chest.  He has had multiple, somewhat migratory, pulmonary nodules.  I think this is the sequela of his EGPA.  The left-sided nodule, came up quite rapidly and is unlikely to represent malignancy.  At this point, I would recommend intensifying his EGPA therapy and obtaining another short interval CT scan of the chest in late summer.    I am going to increase his Nucala to 300 mg every 4 weeks  He will remain on Symbicort with pro-air on an as-needed basis.    I have encouraged  continued efforts at exercise.  He is going to try to get back to cardiac rehab once the virus situation has improved.    We will have him come to the office and get a CBC and spirometry in the next couple days.  I will plan to see him back in 2 months with repeat spirometry.  Hopefully at that time, his symptoms will be significantly improved on the higher dose of Nucala    Jaspreet Quiroz MD  Note electronically signed    CC: Simon Easley MD

## 2020-04-13 ENCOUNTER — DOCUMENTATION (OUTPATIENT)
Dept: PULMONOLOGY | Facility: CLINIC | Age: 75
End: 2020-04-13

## 2020-04-13 ENCOUNTER — LAB (OUTPATIENT)
Dept: PULMONOLOGY | Facility: CLINIC | Age: 75
End: 2020-04-13

## 2020-04-13 DIAGNOSIS — J45.909 IDIOPATHIC ASTHMA: Primary | ICD-10-CM

## 2020-04-13 LAB
BASOPHILS # BLD AUTO: 0.05 10*3/MM3 (ref 0–0.2)
BASOPHILS NFR BLD AUTO: 0.6 % (ref 0–1.5)
DEPRECATED RDW RBC AUTO: 47.4 FL (ref 37–54)
EOSINOPHIL # BLD AUTO: 0.02 10*3/MM3 (ref 0–0.4)
EOSINOPHIL NFR BLD AUTO: 0.2 % (ref 0.3–6.2)
ERYTHROCYTE [DISTWIDTH] IN BLOOD BY AUTOMATED COUNT: 14.1 % (ref 12.3–15.4)
HCT VFR BLD AUTO: 45.3 % (ref 37.5–51)
HGB BLD-MCNC: 14.5 G/DL (ref 13–17.7)
IMM GRANULOCYTES # BLD AUTO: 0.04 10*3/MM3 (ref 0–0.05)
IMM GRANULOCYTES NFR BLD AUTO: 0.5 % (ref 0–0.5)
LYMPHOCYTES # BLD AUTO: 1.37 10*3/MM3 (ref 0.7–3.1)
LYMPHOCYTES NFR BLD AUTO: 16.1 % (ref 19.6–45.3)
MCH RBC QN AUTO: 29.2 PG (ref 26.6–33)
MCHC RBC AUTO-ENTMCNC: 32 G/DL (ref 31.5–35.7)
MCV RBC AUTO: 91.3 FL (ref 79–97)
MONOCYTES # BLD AUTO: 0.69 10*3/MM3 (ref 0.1–0.9)
MONOCYTES NFR BLD AUTO: 8.1 % (ref 5–12)
NEUTROPHILS # BLD AUTO: 6.34 10*3/MM3 (ref 1.7–7)
NEUTROPHILS NFR BLD AUTO: 74.5 % (ref 42.7–76)
NRBC BLD AUTO-RTO: 0 /100 WBC (ref 0–0.2)
PLATELET # BLD AUTO: 263 10*3/MM3 (ref 140–450)
PMV BLD AUTO: 10.4 FL (ref 6–12)
RBC # BLD AUTO: 4.96 10*6/MM3 (ref 4.14–5.8)
WBC NRBC COR # BLD: 8.51 10*3/MM3 (ref 3.4–10.8)

## 2020-04-13 PROCEDURE — 36415 COLL VENOUS BLD VENIPUNCTURE: CPT | Performed by: INTERNAL MEDICINE

## 2020-04-13 PROCEDURE — 36415 COLL VENOUS BLD VENIPUNCTURE: CPT

## 2020-04-13 PROCEDURE — 85025 COMPLETE CBC W/AUTO DIFF WBC: CPT | Performed by: INTERNAL MEDICINE

## 2020-04-13 NOTE — PROGRESS NOTES
Spoke with patient about [employer] paper work patient gave verbal over phone for me to sign on his behalf to get things going

## 2020-04-14 ENCOUNTER — TELEPHONE (OUTPATIENT)
Dept: PULMONOLOGY | Facility: CLINIC | Age: 75
End: 2020-04-14

## 2020-04-30 ENCOUNTER — INFUSION (OUTPATIENT)
Dept: ONCOLOGY | Facility: HOSPITAL | Age: 75
End: 2020-04-30

## 2020-04-30 VITALS
SYSTOLIC BLOOD PRESSURE: 146 MMHG | RESPIRATION RATE: 15 BRPM | HEART RATE: 77 BPM | DIASTOLIC BLOOD PRESSURE: 70 MMHG | TEMPERATURE: 97.9 F

## 2020-04-30 DIAGNOSIS — J45.50 SEVERE PERSISTENT ASTHMA, UNSPECIFIED WHETHER COMPLICATED: Primary | ICD-10-CM

## 2020-04-30 PROCEDURE — 25010000002 MEPOLIZUMAB 100 MG RECONSTITUTED SOLUTION: Performed by: NURSE PRACTITIONER

## 2020-04-30 PROCEDURE — 96372 THER/PROPH/DIAG INJ SC/IM: CPT

## 2020-04-30 RX ADMIN — MEPOLIZUMAB 100 MG: 100 INJECTION, POWDER, FOR SOLUTION SUBCUTANEOUS at 09:08

## 2020-05-28 ENCOUNTER — INFUSION (OUTPATIENT)
Dept: ONCOLOGY | Facility: HOSPITAL | Age: 75
End: 2020-05-28

## 2020-05-28 VITALS
RESPIRATION RATE: 16 BRPM | DIASTOLIC BLOOD PRESSURE: 70 MMHG | TEMPERATURE: 97.8 F | HEART RATE: 79 BPM | SYSTOLIC BLOOD PRESSURE: 128 MMHG

## 2020-05-28 DIAGNOSIS — J45.50 SEVERE PERSISTENT ASTHMA, UNSPECIFIED WHETHER COMPLICATED: Primary | ICD-10-CM

## 2020-05-28 PROCEDURE — 25010000002 MEPOLIZUMAB 100 MG RECONSTITUTED SOLUTION: Performed by: NURSE PRACTITIONER

## 2020-05-28 PROCEDURE — 96372 THER/PROPH/DIAG INJ SC/IM: CPT

## 2020-05-28 RX ADMIN — MEPOLIZUMAB 100 MG: 100 INJECTION, POWDER, FOR SOLUTION SUBCUTANEOUS at 08:51

## 2020-06-01 RX ORDER — ATORVASTATIN CALCIUM 20 MG/1
TABLET, FILM COATED ORAL
Qty: 90 TABLET | Refills: 1 | Status: SHIPPED | OUTPATIENT
Start: 2020-06-01 | End: 2021-01-04

## 2020-06-08 ENCOUNTER — TELEPHONE (OUTPATIENT)
Dept: PULMONOLOGY | Facility: CLINIC | Age: 75
End: 2020-06-08

## 2020-06-16 ENCOUNTER — OFFICE VISIT (OUTPATIENT)
Dept: PULMONOLOGY | Facility: CLINIC | Age: 75
End: 2020-06-16

## 2020-06-16 VITALS
TEMPERATURE: 97 F | WEIGHT: 175.38 LBS | DIASTOLIC BLOOD PRESSURE: 70 MMHG | RESPIRATION RATE: 16 BRPM | BODY MASS INDEX: 26.67 KG/M2 | SYSTOLIC BLOOD PRESSURE: 134 MMHG | OXYGEN SATURATION: 92 % | HEART RATE: 52 BPM

## 2020-06-16 DIAGNOSIS — J30.89 SEASONAL AND PERENNIAL ALLERGIC RHINITIS: ICD-10-CM

## 2020-06-16 DIAGNOSIS — J45.909 IDIOPATHIC ASTHMA: Primary | ICD-10-CM

## 2020-06-16 DIAGNOSIS — Z78.9 NON-SMOKER: ICD-10-CM

## 2020-06-16 DIAGNOSIS — D72.18 EOSINOPHILIC GRANULOMATOSIS WITH POLYANGIITIS (EGPA) (HCC): ICD-10-CM

## 2020-06-16 DIAGNOSIS — J30.2 SEASONAL AND PERENNIAL ALLERGIC RHINITIS: ICD-10-CM

## 2020-06-16 DIAGNOSIS — M30.1 EOSINOPHILIC GRANULOMATOSIS WITH POLYANGIITIS (EGPA) (HCC): ICD-10-CM

## 2020-06-16 DIAGNOSIS — K21.9 CHRONIC GERD: ICD-10-CM

## 2020-06-16 PROCEDURE — 99214 OFFICE O/P EST MOD 30 MIN: CPT | Performed by: INTERNAL MEDICINE

## 2020-06-16 NOTE — PROGRESS NOTES
PULMONARY  NOTE    Chief Complaint     Chronic persistent asthma, rule out EGPA, perennial rhinitis, non-smoker    History of Present Illness     74-year-old white male returns today for follow-up.  I last visited with him on a video visit on 4/10/2020    He is a lifelong non-smoker.    He has a history of asthma with severe obstructive airways disease.  Prior alpha-1 antitrypsin screen is been negative.  Labs were significant for an elevated eosinophil count and a positive atypical ANCA.    For that reason he is felt to have a clinical picture consistent with EGPA although no tissue biopsy has been performed.  He was started on Nucala.  He has been getting Nucala and initially felt that it resulted in a dramatic improvement, less so over the next 3 doses but then since that time he does feel that his exercise capacity is improved as has his dyspnea and wheezing.    He has had no recent acute exacerbation of asthma.  Has not had to be on any recent oral antibiotics or steroids.    He remains on Symbicort with albuterol on an as-needed basis.    He has regular sinus drainage and postnasal drip for which he uses OTC medications.    Patient Active Problem List   Diagnosis   • CAD (coronary artery disease)   • PAD (peripheral artery disease) (CMS/[facility])   • Essential hypertension   • Mixed hyperlipidemia   • Angina pectoris (CMS/[facility])   • Peripheral vascular disease (CMS/[facility])   • Edema of right lower extremity   • Annual physical exam   • Alcohol use   • Preoperative examination   • Sinus bradycardia, persistent   • Postphlebitic syndrome   • GERD   • Perennial rhinitis   • Non-smoker   • Severe persistent asthma   • 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) (CMS/[facility])     No Known Allergies    Current Outpatient Medications:   •  albuterol sulfate HFA (VENTOLIN HFA) 108 (90 Base) MCG/ACT inhaler, Inhale 2 puffs Every 4 (Four) Hours As Needed for Wheezing., Disp: 18 inhaler, Rfl: 5  •  aspirin 325 MG  tablet, Take 325 mg by mouth Daily., Disp: , Rfl:   •  atorvastatin (LIPITOR) 20 MG tablet, TAKE 1 TABLET BY MOUTH EVERYDAY AT BEDTIME, Disp: 90 tablet, Rfl: 1  •  budesonide-formoterol (SYMBICORT) 160-4.5 MCG/ACT inhaler, Inhale 2 puffs 2 (Two) Times a Day., Disp: 1 inhaler, Rfl: 11  •  BYSTOLIC 10 MG tablet, TAKE 1 TABLET BY MOUTH EVERY DAY, Disp: 90 tablet, Rfl: 3  •  chlorthalidone (HYGROTON) 25 MG tablet, TAKE 1/2 TABLET DAILY., Disp: 45 tablet, Rfl: 3  •  fexofenadine (ALLEGRA) 180 MG tablet, Take 180 mg by mouth Daily As Needed., Disp: , Rfl:   •  lisinopril (PRINIVIL,ZESTRIL) 10 MG tablet, TAKE 1 TABLET BY MOUTH EVERY DAY, Disp: 90 tablet, Rfl: 3  •  omeprazole (priLOSEC) 20 MG capsule, Take 20 mg by mouth Daily., Disp: , Rfl:   •  Triamcinolone Acetonide (NASACORT) 55 MCG/ACT nasal inhaler, 2 sprays into the nostril(s) as directed by provider 2 (Two) Times a Day., Disp: , Rfl:   MEDICATION LIST AND ALLERGIES REVIEWED.    Family History   Problem Relation Age of Onset   • Esophageal cancer Father         cause of death     Social History     Tobacco Use   • Smoking status: Never Smoker   • Smokeless tobacco: Never Used   Substance Use Topics   • Alcohol use: Yes     Frequency: Monthly or less     Drinks per session: 1 or 2     Binge frequency: Never     Comment: socially   • Drug use: No     Social History     Social History Narrative        Retired     Lifelong non-smoker    Drinks about 1 alcoholic beverage on a weekly basis     FAMILY AND SOCIAL HISTORY REVIEWED.    Review of Systems   Constitutional: Negative.  Negative for fatigue and unexpected weight change.   HENT: Negative for postnasal drip and rhinorrhea.    Respiratory: Positive for cough, shortness of breath and wheezing.    Cardiovascular: Negative.  Negative for chest pain, palpitations and leg swelling.   Gastrointestinal: Negative for abdominal distention and nausea.   Musculoskeletal: Negative.  Negative for  arthralgias and joint swelling.   Skin: Negative.  Negative for rash and wound.   Allergic/Immunologic: Negative.  Negative for immunocompromised state.   Hematological: Negative.  Negative for adenopathy.   Psychiatric/Behavioral: Negative.  Negative for sleep disturbance.     ALSO REFER TO SCANNED ROS SHEET FROM SAME DATE.    /70 (BP Location: Left arm, Patient Position: Sitting, Cuff Size: Adult)   Pulse 52   Temp 97 °F (36.1 °C)   Resp 16   Wt 79.5 kg (175 lb 6 oz)   SpO2 92% Comment: room air at rest  BMI 26.67 kg/m²   Physical Exam   Constitutional: He is oriented to person, place, and time. He appears well-developed. No distress.   HENT:   Head: Normocephalic and atraumatic.   Neck: No thyromegaly present.   Cardiovascular: Normal rate, regular rhythm and normal heart sounds.   No murmur heard.  Pulmonary/Chest: Effort normal and breath sounds normal. No stridor.   Abdominal: Soft. Bowel sounds are normal.   Musculoskeletal: Normal range of motion. He exhibits no edema.   Lymphadenopathy:     He has no cervical adenopathy.        Right: No supraclavicular and no epitrochlear adenopathy present.        Left: No supraclavicular and no epitrochlear adenopathy present.   Neurological: He is alert and oriented to person, place, and time.   Skin: Skin is warm and dry. He is not diaphoretic.   Psychiatric: He has a normal mood and affect. His behavior is normal.   Nursing note and vitals reviewed.      Results     Eosinophil count from CBC done on 4/13/2020 was normal    Problem List       ICD-10-CM ICD-9-CM   1. Chronic persistent asthma J45.909 493.90   2. Perennial rhinitis J30.89 477.9    J30.2    3. Non-smoker Z78.9 V49.89   4. GERD K21.9 530.81   5. 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  Maybe not as good as he felt after his first Nucala dose but overall significantly better than he was  before the institution of Nucala.  At this point questioning whether we should increase his Nucala which was just approved.    He is going to remain on Symbicort with albuterol on an as-needed basis.    I have encouraged regular exercise.    He is can continue to use OTC medications for his sinus symptoms.    I will see him back in August with repeat PFTs.  Also have to repeat chest imaging at that time to follow-up on his nodular infiltrates.  Based on that we will consider whether or not to increase his Nucala dosing.    Jaspreet Quiroz MD  Note electronically signed    CC: Simon Easley MD

## 2020-06-17 DIAGNOSIS — R91.8 MULTIPLE PULMONARY NODULES: ICD-10-CM

## 2020-06-17 DIAGNOSIS — R91.8 ABNORMAL CT SCAN OF LUNG: Primary | ICD-10-CM

## 2020-06-25 ENCOUNTER — INFUSION (OUTPATIENT)
Dept: ONCOLOGY | Facility: HOSPITAL | Age: 75
End: 2020-06-25

## 2020-06-25 VITALS
TEMPERATURE: 98.2 F | DIASTOLIC BLOOD PRESSURE: 71 MMHG | RESPIRATION RATE: 16 BRPM | SYSTOLIC BLOOD PRESSURE: 139 MMHG | HEART RATE: 61 BPM

## 2020-06-25 DIAGNOSIS — J45.50 SEVERE PERSISTENT ASTHMA, UNSPECIFIED WHETHER COMPLICATED: Primary | ICD-10-CM

## 2020-06-25 PROCEDURE — 25010000002 MEPOLIZUMAB 100 MG RECONSTITUTED SOLUTION: Performed by: NURSE PRACTITIONER

## 2020-06-25 PROCEDURE — 96372 THER/PROPH/DIAG INJ SC/IM: CPT

## 2020-06-25 RX ADMIN — MEPOLIZUMAB 100 MG: 100 INJECTION, POWDER, FOR SOLUTION SUBCUTANEOUS at 08:18

## 2020-07-23 ENCOUNTER — INFUSION (OUTPATIENT)
Dept: ONCOLOGY | Facility: HOSPITAL | Age: 75
End: 2020-07-23

## 2020-07-23 VITALS
HEART RATE: 65 BPM | SYSTOLIC BLOOD PRESSURE: 149 MMHG | TEMPERATURE: 98.3 F | DIASTOLIC BLOOD PRESSURE: 85 MMHG | RESPIRATION RATE: 16 BRPM

## 2020-07-23 DIAGNOSIS — J45.50 SEVERE PERSISTENT ASTHMA, UNSPECIFIED WHETHER COMPLICATED: Primary | ICD-10-CM

## 2020-07-23 PROCEDURE — 25010000002 MEPOLIZUMAB 100 MG RECONSTITUTED SOLUTION: Performed by: NURSE PRACTITIONER

## 2020-07-23 PROCEDURE — 96372 THER/PROPH/DIAG INJ SC/IM: CPT

## 2020-07-23 RX ADMIN — MEPOLIZUMAB 100 MG: 100 INJECTION, POWDER, FOR SOLUTION SUBCUTANEOUS at 10:05

## 2020-08-05 ENCOUNTER — HOSPITAL ENCOUNTER (OUTPATIENT)
Dept: CT IMAGING | Facility: HOSPITAL | Age: 75
Discharge: HOME | End: 2020-08-05
Admitting: INTERNAL MEDICINE

## 2020-08-05 DIAGNOSIS — R91.8 MULTIPLE PULMONARY NODULES: ICD-10-CM

## 2020-08-05 DIAGNOSIS — R91.8 ABNORMAL CT SCAN OF LUNG: ICD-10-CM

## 2020-08-05 PROCEDURE — 71250 CT THORAX DX C-: CPT

## 2020-08-10 DIAGNOSIS — Z01.812 BLOOD TESTS PRIOR TO TREATMENT OR PROCEDURE: Primary | ICD-10-CM

## 2020-08-10 PROCEDURE — U0004 COV-19 TEST NON-CDC HGH THRU: HCPCS | Performed by: INTERNAL MEDICINE

## 2020-08-10 PROCEDURE — U0002 COVID-19 LAB TEST NON-CDC: HCPCS | Performed by: INTERNAL MEDICINE

## 2020-08-11 LAB
REF LAB TEST METHOD: NORMAL
SARS-COV-2 RNA RESP QL NAA+PROBE: NOT DETECTED

## 2020-08-13 ENCOUNTER — OFFICE VISIT (OUTPATIENT)
Dept: PULMONOLOGY | Facility: CLINIC | Age: 75
End: 2020-08-13

## 2020-08-13 VITALS
TEMPERATURE: 97.1 F | HEART RATE: 70 BPM | RESPIRATION RATE: 16 BRPM | OXYGEN SATURATION: 95 % | DIASTOLIC BLOOD PRESSURE: 84 MMHG | HEIGHT: 68 IN | SYSTOLIC BLOOD PRESSURE: 120 MMHG | WEIGHT: 180.25 LBS | BODY MASS INDEX: 27.32 KG/M2

## 2020-08-13 DIAGNOSIS — Z78.9 NON-SMOKER: ICD-10-CM

## 2020-08-13 DIAGNOSIS — J30.89 SEASONAL AND PERENNIAL ALLERGIC RHINITIS: ICD-10-CM

## 2020-08-13 DIAGNOSIS — J30.2 SEASONAL AND PERENNIAL ALLERGIC RHINITIS: ICD-10-CM

## 2020-08-13 DIAGNOSIS — K21.9 CHRONIC GERD: ICD-10-CM

## 2020-08-13 DIAGNOSIS — D72.18 EOSINOPHILIC GRANULOMATOSIS WITH POLYANGIITIS (EGPA) (HCC): Primary | ICD-10-CM

## 2020-08-13 DIAGNOSIS — R91.8 BILATERAL PULMONARY INFILTRATES ON CHEST X-RAY: ICD-10-CM

## 2020-08-13 DIAGNOSIS — J45.50 SEVERE PERSISTENT ASTHMA, UNSPECIFIED WHETHER COMPLICATED: ICD-10-CM

## 2020-08-13 DIAGNOSIS — M30.1 EOSINOPHILIC GRANULOMATOSIS WITH POLYANGIITIS (EGPA) (HCC): Primary | ICD-10-CM

## 2020-08-13 PROCEDURE — 94729 DIFFUSING CAPACITY: CPT | Performed by: INTERNAL MEDICINE

## 2020-08-13 PROCEDURE — 99214 OFFICE O/P EST MOD 30 MIN: CPT | Performed by: INTERNAL MEDICINE

## 2020-08-13 PROCEDURE — 94375 RESPIRATORY FLOW VOLUME LOOP: CPT | Performed by: INTERNAL MEDICINE

## 2020-08-13 PROCEDURE — 94726 PLETHYSMOGRAPHY LUNG VOLUMES: CPT | Performed by: INTERNAL MEDICINE

## 2020-08-13 NOTE — PROGRESS NOTES
PULMONARY  NOTE    Chief Complaint     Chronic persistent asthma, rule out EGPA, perennial rhinitis, non-smoker, pulmonary infiltrates    History of Present Illness     75-year-old white male returns today for follow-up.  I last saw him on 6/16/2020    He is a lifelong non-smoker.    He has severe obstructive airways disease due to chronic persistent asthma.  Alpha 1 antitrypsin screen was negative but he had a positive atypical ANCA and elevated eosinophil count.  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  Has been started on new Ivy and has noted significant improvement in symptoms.  He returns today indicating that he feels that his symptoms remain significantly improved on his current medical regimen.    He continues to use Symbicort on a regular basis and albuterol infrequently.    He has perennial sinus symptoms, currently relatively quiesced sent.  He typically uses OTC medications    Patient Active Problem List   Diagnosis   • CAD (coronary artery disease)   • PAD (peripheral artery disease) (CMS/[facility])   • Essential hypertension   • Mixed hyperlipidemia   • Angina pectoris (CMS/[facility])   • Peripheral vascular disease (CMS/[facility])   • Edema of right lower extremity   • Annual physical exam   • Alcohol use   • Preoperative examination   • Sinus bradycardia, persistent   • Postphlebitic syndrome   • GERD   • Perennial rhinitis   • Non-smoker   • Severe persistent asthma   • 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) (CMS/[facility])   • Bilateral pulmonary infiltrates     No Known Allergies    Current Outpatient Medications:   •  albuterol sulfate HFA (VENTOLIN HFA) 108 (90 Base) MCG/ACT inhaler, Inhale 2 puffs Every 4 (Four) Hours As Needed for Wheezing., Disp: 18 inhaler, Rfl: 5  •  aspirin 325 MG tablet, Take 325 mg by mouth Daily., Disp: , Rfl:   •  atorvastatin (LIPITOR) 20 MG tablet, TAKE 1 TABLET BY MOUTH EVERYDAY AT BEDTIME, Disp: 90 tablet, Rfl: 1  •   "budesonide-formoterol (SYMBICORT) 160-4.5 MCG/ACT inhaler, Inhale 2 puffs 2 (Two) Times a Day., Disp: 1 inhaler, Rfl: 11  •  BYSTOLIC 10 MG tablet, TAKE 1 TABLET BY MOUTH EVERY DAY, Disp: 90 tablet, Rfl: 3  •  chlorthalidone (HYGROTON) 25 MG tablet, TAKE 1/2 TABLET DAILY., Disp: 45 tablet, Rfl: 3  •  fexofenadine (ALLEGRA) 180 MG tablet, Take 180 mg by mouth Daily As Needed., Disp: , Rfl:   •  lisinopril (PRINIVIL,ZESTRIL) 10 MG tablet, TAKE 1 TABLET BY MOUTH EVERY DAY, Disp: 90 tablet, Rfl: 3  •  omeprazole (priLOSEC) 20 MG capsule, Take 20 mg by mouth Daily., Disp: , Rfl:   •  Triamcinolone Acetonide (NASACORT) 55 MCG/ACT nasal inhaler, 2 sprays into the nostril(s) as directed by provider 2 (Two) Times a Day., Disp: , Rfl:   MEDICATION LIST AND ALLERGIES REVIEWED.    Family History   Problem Relation Age of Onset   • Esophageal cancer Father         cause of death     Social History     Tobacco Use   • Smoking status: Never Smoker   • Smokeless tobacco: Never Used   Substance Use Topics   • Alcohol use: Yes     Frequency: Monthly or less     Drinks per session: 1 or 2     Binge frequency: Never     Comment: socially   • Drug use: No     Social History     Social History Narrative        Retired     Lifelong non-smoker    Drinks about 1 alcoholic beverage on a weekly basis     FAMILY AND SOCIAL HISTORY REVIEWED.    Review of Systems  ALSO REFER TO SCANNED ROS SHEET FROM SAME DATE.    /84 (BP Location: Right arm, Patient Position: Sitting, Cuff Size: Adult)   Pulse 70   Temp 97.1 °F (36.2 °C)   Resp 16   Ht 172.7 cm (68\")   Wt 81.8 kg (180 lb 4 oz)   SpO2 95% Comment: room air at rest  BMI 27.41 kg/m²   Physical Exam   Constitutional: He is oriented to person, place, and time. He appears well-developed. No distress.   HENT:   Head: Normocephalic and atraumatic.   Neck: No thyromegaly present.   Cardiovascular: Normal rate, regular rhythm and normal heart sounds.   No murmur " heard.  Pulmonary/Chest: Effort normal and breath sounds normal. No stridor.   Abdominal: Soft. Bowel sounds are normal.   Musculoskeletal: Normal range of motion. He exhibits no edema.   Lymphadenopathy:     He has no cervical adenopathy.        Right: No supraclavicular and no epitrochlear adenopathy present.        Left: No supraclavicular and no epitrochlear adenopathy present.   Neurological: He is alert and oriented to person, place, and time.   Skin: Skin is warm and dry. He is not diaphoretic.   Psychiatric: He has a normal mood and affect. His behavior is normal.   Nursing note and vitals reviewed.      Results     CT scan of the chest from 8/5/2020 reviewed on PACS.  Significant interval improvement in infiltrates    PFTs today reveal severe airway obstruction, no restriction.  Normal diffusion capacity    Problem List       ICD-10-CM ICD-9-CM   1. 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) M30.1 446.4   2. Severe persistent asthma, unspecified whether complicated J45.50 493.90   3. Non-smoker Z78.9 V49.89   4. Perennial rhinitis J30.89 477.9    J30.2    5. GERD K21.9 530.81   6. Bilateral pulmonary infiltrates R91.8 793.19       Discussion     Radiographically and symptomatically he is improved on his current medical regimen.  PFTs continue to exhibit significant airway obstruction.  His clinical picture is consistent with EGPA and will continue Nucala.  We discussed increasing his dosing however with improvement in symptoms and improvement in CT scan will keep him on his current dosing for the time being    Should probably do additional chest imaging sometime in the future, possibly after the first of the year    Also, continue Symbicort with albuterol on an as-needed basis.    He will remain on OTC medications for his perennial rhinitis symptoms.    I will plan to see him back in 3 to 4 months or earlier if there are any problems in the meantime    Jaspreet Quiroz MD  Note  electronically signed    CC: Simon Easley MD

## 2020-08-20 ENCOUNTER — INFUSION (OUTPATIENT)
Dept: ONCOLOGY | Facility: HOSPITAL | Age: 75
End: 2020-08-20

## 2020-08-20 VITALS
RESPIRATION RATE: 20 BRPM | SYSTOLIC BLOOD PRESSURE: 141 MMHG | HEART RATE: 70 BPM | DIASTOLIC BLOOD PRESSURE: 76 MMHG | TEMPERATURE: 97.6 F

## 2020-08-20 DIAGNOSIS — J45.50 SEVERE PERSISTENT ASTHMA, UNSPECIFIED WHETHER COMPLICATED: Primary | ICD-10-CM

## 2020-08-20 PROCEDURE — 96372 THER/PROPH/DIAG INJ SC/IM: CPT

## 2020-08-20 PROCEDURE — 25010000002 MEPOLIZUMAB 100 MG RECONSTITUTED SOLUTION: Performed by: NURSE PRACTITIONER

## 2020-08-20 RX ADMIN — MEPOLIZUMAB 100 MG: 100 INJECTION, POWDER, FOR SOLUTION SUBCUTANEOUS at 08:56

## 2020-09-15 DIAGNOSIS — D72.18 EOSINOPHILIC GRANULOMATOSIS WITH POLYANGIITIS (EGPA) (HCC): Primary | ICD-10-CM

## 2020-09-15 DIAGNOSIS — M30.1 EOSINOPHILIC GRANULOMATOSIS WITH POLYANGIITIS (EGPA) (HCC): Primary | ICD-10-CM

## 2020-09-15 RX ORDER — MEPOLIZUMAB 100 MG/ML
100 INJECTION, SOLUTION SUBCUTANEOUS
Qty: 1 ML | Refills: 11 | OUTPATIENT
Start: 2020-09-15 | End: 2022-02-03

## 2020-09-17 ENCOUNTER — INFUSION (OUTPATIENT)
Dept: ONCOLOGY | Facility: HOSPITAL | Age: 75
End: 2020-09-17

## 2020-09-17 VITALS
SYSTOLIC BLOOD PRESSURE: 129 MMHG | TEMPERATURE: 97.8 F | DIASTOLIC BLOOD PRESSURE: 61 MMHG | HEART RATE: 55 BPM | RESPIRATION RATE: 18 BRPM

## 2020-09-17 DIAGNOSIS — D72.18 EOSINOPHILIC GRANULOMATOSIS WITH POLYANGIITIS (EGPA) (HCC): Primary | ICD-10-CM

## 2020-09-17 DIAGNOSIS — M30.1 EOSINOPHILIC GRANULOMATOSIS WITH POLYANGIITIS (EGPA) (HCC): Primary | ICD-10-CM

## 2020-09-17 PROCEDURE — 96372 THER/PROPH/DIAG INJ SC/IM: CPT

## 2020-09-17 PROCEDURE — 25010000002 MEPOLIZUMAB 100 MG RECONSTITUTED SOLUTION: Performed by: INTERNAL MEDICINE

## 2020-09-17 RX ADMIN — MEPOLIZUMAB 100 MG: 100 INJECTION, POWDER, FOR SOLUTION SUBCUTANEOUS at 09:18

## 2020-10-02 RX ORDER — LISINOPRIL 10 MG/1
TABLET ORAL
Qty: 90 TABLET | Refills: 3 | Status: SHIPPED | OUTPATIENT
Start: 2020-10-02 | End: 2021-09-14

## 2020-10-15 ENCOUNTER — INFUSION (OUTPATIENT)
Dept: ONCOLOGY | Facility: HOSPITAL | Age: 75
End: 2020-10-15

## 2020-10-15 VITALS
DIASTOLIC BLOOD PRESSURE: 69 MMHG | SYSTOLIC BLOOD PRESSURE: 116 MMHG | TEMPERATURE: 98.2 F | HEART RATE: 80 BPM | RESPIRATION RATE: 16 BRPM

## 2020-10-15 DIAGNOSIS — M30.1 EOSINOPHILIC GRANULOMATOSIS WITH POLYANGIITIS (EGPA) (HCC): Primary | ICD-10-CM

## 2020-10-15 DIAGNOSIS — D72.18 EOSINOPHILIC GRANULOMATOSIS WITH POLYANGIITIS (EGPA) (HCC): Primary | ICD-10-CM

## 2020-10-15 PROCEDURE — 25010000002 MEPOLIZUMAB 100 MG RECONSTITUTED SOLUTION: Performed by: INTERNAL MEDICINE

## 2020-10-15 PROCEDURE — 96372 THER/PROPH/DIAG INJ SC/IM: CPT

## 2020-10-15 RX ADMIN — MEPOLIZUMAB 100 MG: 100 INJECTION, POWDER, FOR SOLUTION SUBCUTANEOUS at 13:10

## 2020-11-05 ENCOUNTER — OFFICE VISIT (OUTPATIENT)
Dept: INTERNAL MEDICINE | Facility: CLINIC | Age: 75
End: 2020-11-05

## 2020-11-05 ENCOUNTER — LAB REQUISITION (OUTPATIENT)
Dept: LAB | Facility: HOSPITAL | Age: 75
End: 2020-11-05

## 2020-11-05 VITALS
HEIGHT: 68 IN | WEIGHT: 184 LBS | HEART RATE: 67 BPM | TEMPERATURE: 98.2 F | BODY MASS INDEX: 27.89 KG/M2 | SYSTOLIC BLOOD PRESSURE: 132 MMHG | OXYGEN SATURATION: 97 % | DIASTOLIC BLOOD PRESSURE: 70 MMHG

## 2020-11-05 DIAGNOSIS — Z00.00 ROUTINE GENERAL MEDICAL EXAMINATION AT A HEALTH CARE FACILITY: ICD-10-CM

## 2020-11-05 DIAGNOSIS — Z12.5 SCREENING PSA (PROSTATE SPECIFIC ANTIGEN): ICD-10-CM

## 2020-11-05 DIAGNOSIS — E78.2 MIXED HYPERLIPIDEMIA: Primary | ICD-10-CM

## 2020-11-05 DIAGNOSIS — I10 ESSENTIAL HYPERTENSION: ICD-10-CM

## 2020-11-05 DIAGNOSIS — Z00.00 MEDICARE ANNUAL WELLNESS VISIT, SUBSEQUENT: ICD-10-CM

## 2020-11-05 DIAGNOSIS — I73.9 PAD (PERIPHERAL ARTERY DISEASE) (HCC): ICD-10-CM

## 2020-11-05 DIAGNOSIS — I20.9 ANGINA PECTORIS (HCC): ICD-10-CM

## 2020-11-05 PROCEDURE — 36415 COLL VENOUS BLD VENIPUNCTURE: CPT

## 2020-11-05 PROCEDURE — 96160 PT-FOCUSED HLTH RISK ASSMT: CPT | Performed by: PHYSICIAN ASSISTANT

## 2020-11-05 PROCEDURE — G0439 PPPS, SUBSEQ VISIT: HCPCS | Performed by: PHYSICIAN ASSISTANT

## 2020-11-05 NOTE — ASSESSMENT & PLAN NOTE
Patient is current on immunizations.  Last colonoscopy 3/12/2019, patient is due in 5 years.  Fasting labs this morning.

## 2020-11-05 NOTE — PROGRESS NOTES
QUICK REFERENCE INFORMATION:  The ABCs of the Annual Wellness Visit    Subsequent Medicare Wellness Visit     HEALTH RISK ASSESSMENT    : 1945    Recent Hospitalizations:  No hospitalization(s) within the last year..  ccc      Current Medical Providers:  Patient Care Team:  Simon Easley MD as PCP - General  Vic Blas MD as Consulting Physician (Cardiology)  Sukhdev Sun MD (Dermatology)  Jaspreet Quiroz MD as Emergency Attending (Pulmonary Disease)  Joey Elizalde MD as Consulting Physician (Ophthalmology)        Smoking Status:  Social History     Tobacco Use   Smoking Status Never Smoker   Smokeless Tobacco Never Used       Alcohol Consumption:  Social History     Substance and Sexual Activity   Alcohol Use Yes   • Frequency: Monthly or less   • Drinks per session: 1 or 2   • Binge frequency: Never       Depression Screen:   PHQ-2/PHQ-9 Depression Screening 2020   Little interest or pleasure in doing things 0   Feeling down, depressed, or hopeless 0   Total Score 0       Health Habits and Functional and Cognitive Screening:  Functional & Cognitive Status 2020   Do you have difficulty preparing food and eating? No   Do you have difficulty bathing yourself, getting dressed or grooming yourself? No   Do you have difficulty using the toilet? No   Do you have difficulty moving around from place to place? No   Do you have trouble with steps or getting out of a bed or a chair? No   Current Diet Well Balanced Diet   Dental Exam Up to date   Eye Exam Up to date   Exercise (times per week) 2 times per week   Current Exercise Activities Include Cardiovasular Workout on Exercise Equipment   Do you need help using the phone?  No   Are you deaf or do you have serious difficulty hearing?  No   Do you need help with transportation? No   Do you need help shopping? No   Do you need help preparing meals?  No   Do you need help with housework?  No   Do you need help with  laundry? No   Do you need help taking your medications? No   Do you need help managing money? No   Do you ever drive or ride in a car without wearing a seat belt? No   Have you felt unusual stress, anger or loneliness in the last month? No   Who do you live with? Spouse   If you need help, do you have trouble finding someone available to you? No   Have you been bothered in the last four weeks by sexual problems? No   Do you have difficulty concentrating, remembering or making decisions? No         Does the patient have evidence of cognitive impairment? No    Asiprin use counseling: Taking ASA appropriately as indicated    Recent Lab Results:  Orders Only on 08/10/2020   Component Date Value Ref Range Status   • Reference Lab Report 08/10/2020    Final    See scanned report     • COVID19 08/10/2020 Not Detected  Not Detected - Ref. Range Final       Imaging:  No radiology results for the last 30 days.    Age-appropriate Screening Schedule:  Refer to the list below for future screening recommendations based on patient's age, sex and/or medical conditions. Orders for these recommended tests are listed in the plan section. The patient has been provided with a written plan.    Health Maintenance   Topic Date Due   • LIPID PANEL  11/01/2020   • TDAP/TD VACCINES (2 - Td) 06/25/2023   • COLONOSCOPY  03/12/2029   • INFLUENZA VACCINE  Completed   • ZOSTER VACCINE  Completed        Subjective   History of Present Illness    Uzair Jacob is a 75 y.o. male who presents for an Annual Wellness Visit.  Patient has been exercising at cardiac rehab for the past several months.  He denies chest pain, shortness of breath, or palpitations.  Current on immunizations.  Fasting labs this morning.    The following portions of the patient's history were reviewed and updated as appropriate: allergies, current medications, past family history, past medical history, past social history, past surgical history and problem  list.    Outpatient Medications Prior to Visit   Medication Sig Dispense Refill   • albuterol sulfate HFA (VENTOLIN HFA) 108 (90 Base) MCG/ACT inhaler Inhale 2 puffs Every 4 (Four) Hours As Needed for Wheezing. 18 inhaler 5   • aspirin 325 MG tablet Take 325 mg by mouth Daily.     • atorvastatin (LIPITOR) 20 MG tablet TAKE 1 TABLET BY MOUTH EVERYDAY AT BEDTIME 90 tablet 1   • budesonide-formoterol (SYMBICORT) 160-4.5 MCG/ACT inhaler Inhale 2 puffs 2 (Two) Times a Day. 1 inhaler 11   • BYSTOLIC 10 MG tablet TAKE 1 TABLET BY MOUTH EVERY DAY 90 tablet 3   • chlorthalidone (HYGROTON) 25 MG tablet TAKE 1/2 TABLET DAILY. 45 tablet 3   • fexofenadine (ALLEGRA) 180 MG tablet Take 180 mg by mouth Daily As Needed.     • lisinopril (PRINIVIL,ZESTRIL) 10 MG tablet TAKE 1 TABLET BY MOUTH EVERY DAY 90 tablet 3   • Mepolizumab (Nucala) 100 MG/ML solution prefilled syringe Inject 1 mL under the skin into the appropriate area as directed Every 28 (Twenty-Eight) Days. 1 mL 11   • omeprazole (priLOSEC) 20 MG capsule Take 20 mg by mouth Daily.     • Triamcinolone Acetonide (NASACORT) 55 MCG/ACT nasal inhaler 2 sprays into the nostril(s) as directed by provider 2 (Two) Times a Day.       No facility-administered medications prior to visit.        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 Patient has an advance directive in EMR which is still valid.     Identification of Risk Factors:  Risk factors include: Advance Directive Discussion.    Review of Systems   Constitutional: Negative for chills, fatigue, fever, unexpected weight gain and unexpected weight loss.   HENT: Negative for congestion, ear pain and sinus pressure.    Respiratory: Negative for cough, chest tightness, shortness of breath and wheezing.    Cardiovascular: Negative for chest pain, palpitations and leg swelling.   Gastrointestinal: Negative for abdominal pain, blood in stool, constipation and nausea.   Endocrine: Negative for cold intolerance and heat intolerance.   Skin: Negative for color change and rash.   Allergic/Immunologic: Negative for environmental allergies.   Neurological: Negative for dizziness, syncope, speech difficulty, weakness and headache.   Psychiatric/Behavioral: Negative for decreased concentration. The patient is not nervous/anxious.        Compared to one year ago, the patient feels his physical health is better.  Compared to one year ago, the patient feels his mental health is the same.    Objective     Physical Exam  Vitals signs and nursing note reviewed.   Constitutional:       General: He is not in acute distress.     Appearance: Normal appearance. He is well-developed. He is not diaphoretic.   HENT:      Head: Normocephalic and atraumatic.   Eyes:      Extraocular Movements: Extraocular movements intact.      Conjunctiva/sclera: Conjunctivae normal.      Pupils: Pupils are equal, round, and reactive to light.   Neck:      Vascular: No JVD.   Cardiovascular:      Rate and Rhythm: Normal rate and regular rhythm.      Heart sounds: Normal heart sounds. No murmur.   Pulmonary:      Effort: Pulmonary effort is normal. No respiratory distress.      Breath sounds: Normal breath sounds.   Chest:      Chest wall: No tenderness.  "  Abdominal:      General: There is no distension.      Palpations: Abdomen is soft.      Tenderness: There is no abdominal tenderness.   Skin:     General: Skin is warm and dry.      Coloration: Skin is not pale.      Findings: No erythema.   Neurological:      General: No focal deficit present.      Mental Status: He is alert and oriented to person, place, and time. Mental status is at baseline.   Psychiatric:         Mood and Affect: Mood normal.         Thought Content: Thought content normal.         Judgment: Judgment normal.         Vitals:    11/05/20 0902 11/05/20 0927   BP: 146/68 132/70   BP Location: Left arm    Patient Position: Sitting    Cuff Size: Adult    Pulse: 67    Temp: 98.2 °F (36.8 °C)    SpO2: 97%    Weight: 83.5 kg (184 lb)    Height: 172.7 cm (68\")    PainSc: 0-No pain        Patient's Body mass index is 27.98 kg/m². BMI is within normal parameters. No follow-up required.. He does perform cardiac rehabilitation.      Assessment/Plan   Patient Self-Management and Personalized Health Advice  The patient has been provided with information about: exercise and prevention of cardiac or vascular disease and preventive services including:   · Annual Wellness Visit (AWV).    Problem List Items Addressed This Visit        Cardiovascular and Mediastinum    Essential hypertension    Overview     Continue lisinopril 10 mg tablets, chlorthalidone 25 mg tablets daily.         Current Assessment & Plan     Hypertension is improving with treatment.  Continue current treatment regimen.  Dietary sodium restriction.  Regular aerobic exercise.  Continue current medications.  Blood pressure will be reassessed at the next regular appointment.         Relevant Medications    BYSTOLIC 10 MG tablet    chlorthalidone (HYGROTON) 25 MG tablet    lisinopril (PRINIVIL,ZESTRIL) 10 MG tablet    Other Relevant Orders    CBC & Differential    Comprehensive Metabolic Panel    MicroAlbumin, Urine, Random - Urine, Clean Catch "    Mixed hyperlipidemia - Primary    Overview     Continue atorvastatin 20 mg tablets daily.         Current Assessment & Plan     Lipid abnormalities are improving with treatment.  Nutritional counseling was provided. and Pharmacotherapy as ordered.  Lipids will be reassessed in 6 months.         Relevant Medications    atorvastatin (LIPITOR) 20 MG tablet    Other Relevant Orders    Lipid Panel    MicroAlbumin, Urine, Random - Urine, Clean Catch    Angina pectoris (CMS/HCC)    Overview     Stable.         Relevant Medications    BYSTOLIC 10 MG tablet       Other    PAD (peripheral artery disease) (CMS/HCC)    Overview     a. Asymptomatic abnormal [place].  b. Right 0.7, left [place], April 2012.           Screening PSA (prostate specific antigen)    Relevant Orders    PSA Screen    Medicare annual wellness visit, subsequent    Current Assessment & Plan     Patient is current on immunizations.  Last colonoscopy 3/12/2019, patient is due in 5 years.  Fasting labs this morning.               Reviewed use of high risk medication in the elderly: yes  Reviewed for potential of harmful drug interactions in the elderly: yes    Follow Up:  Return in about 1 week (around 11/12/2020).     An After Visit Summary and PPPS with all of these plans were given to the patient.

## 2020-11-06 LAB
ALBUMIN SERPL-MCNC: 4.4 G/DL (ref 3.5–5.2)
ALBUMIN/GLOB SERPL: 1.8 G/DL
ALP SERPL-CCNC: 69 U/L (ref 39–117)
ALT SERPL-CCNC: 17 U/L (ref 1–41)
AST SERPL-CCNC: 20 U/L (ref 1–40)
BASOPHILS # BLD AUTO: 0.04 10*3/MM3 (ref 0–0.2)
BASOPHILS NFR BLD AUTO: 0.5 % (ref 0–1.5)
BILIRUB SERPL-MCNC: 1.4 MG/DL (ref 0–1.2)
BUN SERPL-MCNC: 19 MG/DL (ref 8–23)
BUN/CREAT SERPL: 17.3 (ref 7–25)
CALCIUM SERPL-MCNC: 9.4 MG/DL (ref 8.6–10.5)
CHLORIDE SERPL-SCNC: 101 MMOL/L (ref 98–107)
CHOLEST SERPL-MCNC: 179 MG/DL (ref 0–200)
CO2 SERPL-SCNC: 36.8 MMOL/L (ref 22–29)
CREAT SERPL-MCNC: 1.1 MG/DL (ref 0.76–1.27)
EOSINOPHIL # BLD AUTO: 0.03 10*3/MM3 (ref 0–0.4)
EOSINOPHIL NFR BLD AUTO: 0.3 % (ref 0.3–6.2)
ERYTHROCYTE [DISTWIDTH] IN BLOOD BY AUTOMATED COUNT: 13.1 % (ref 12.3–15.4)
GLOBULIN SER CALC-MCNC: 2.4 GM/DL
GLUCOSE SERPL-MCNC: 112 MG/DL (ref 65–99)
HCT VFR BLD AUTO: 46.3 % (ref 37.5–51)
HDLC SERPL-MCNC: 70 MG/DL (ref 40–60)
HGB BLD-MCNC: 15.7 G/DL (ref 13–17.7)
IMM GRANULOCYTES # BLD AUTO: 0.03 10*3/MM3 (ref 0–0.05)
IMM GRANULOCYTES NFR BLD AUTO: 0.3 % (ref 0–0.5)
LDLC SERPL CALC-MCNC: 95 MG/DL (ref 0–100)
LYMPHOCYTES # BLD AUTO: 1.77 10*3/MM3 (ref 0.7–3.1)
LYMPHOCYTES NFR BLD AUTO: 20.4 % (ref 19.6–45.3)
MCH RBC QN AUTO: 29.6 PG (ref 26.6–33)
MCHC RBC AUTO-ENTMCNC: 33.9 G/DL (ref 31.5–35.7)
MCV RBC AUTO: 87.4 FL (ref 79–97)
MICROALBUMIN UR-MCNC: 3.8 UG/ML
MONOCYTES # BLD AUTO: 0.95 10*3/MM3 (ref 0.1–0.9)
MONOCYTES NFR BLD AUTO: 10.9 % (ref 5–12)
NEUTROPHILS # BLD AUTO: 5.86 10*3/MM3 (ref 1.7–7)
NEUTROPHILS NFR BLD AUTO: 67.6 % (ref 42.7–76)
NRBC BLD AUTO-RTO: 0 /100 WBC (ref 0–0.2)
PLATELET # BLD AUTO: 223 10*3/MM3 (ref 140–450)
POTASSIUM SERPL-SCNC: 4.2 MMOL/L (ref 3.5–5.2)
PROT SERPL-MCNC: 6.8 G/DL (ref 6–8.5)
PSA SERPL-MCNC: 2.96 NG/ML (ref 0–4)
RBC # BLD AUTO: 5.3 10*6/MM3 (ref 4.14–5.8)
SODIUM SERPL-SCNC: 142 MMOL/L (ref 136–145)
TRIGL SERPL-MCNC: 73 MG/DL (ref 0–150)
VLDLC SERPL CALC-MCNC: 14 MG/DL (ref 5–40)
WBC # BLD AUTO: 8.68 10*3/MM3 (ref 3.4–10.8)

## 2020-11-12 ENCOUNTER — INFUSION (OUTPATIENT)
Dept: ONCOLOGY | Facility: HOSPITAL | Age: 75
End: 2020-11-12

## 2020-11-12 ENCOUNTER — OFFICE VISIT (OUTPATIENT)
Dept: INTERNAL MEDICINE | Facility: CLINIC | Age: 75
End: 2020-11-12

## 2020-11-12 VITALS
BODY MASS INDEX: 27.92 KG/M2 | DIASTOLIC BLOOD PRESSURE: 80 MMHG | WEIGHT: 184.2 LBS | SYSTOLIC BLOOD PRESSURE: 142 MMHG | HEART RATE: 64 BPM | HEIGHT: 68 IN | TEMPERATURE: 96.9 F

## 2020-11-12 VITALS
DIASTOLIC BLOOD PRESSURE: 81 MMHG | SYSTOLIC BLOOD PRESSURE: 124 MMHG | RESPIRATION RATE: 16 BRPM | TEMPERATURE: 97.8 F | HEART RATE: 57 BPM

## 2020-11-12 DIAGNOSIS — I10 ESSENTIAL HYPERTENSION: Primary | ICD-10-CM

## 2020-11-12 DIAGNOSIS — E78.2 MIXED HYPERLIPIDEMIA: ICD-10-CM

## 2020-11-12 DIAGNOSIS — M30.1 EOSINOPHILIC GRANULOMATOSIS WITH POLYANGIITIS (EGPA) (HCC): Primary | ICD-10-CM

## 2020-11-12 DIAGNOSIS — R73.09 ABNORMAL GLUCOSE: ICD-10-CM

## 2020-11-12 DIAGNOSIS — D72.18 EOSINOPHILIC GRANULOMATOSIS WITH POLYANGIITIS (EGPA) (HCC): ICD-10-CM

## 2020-11-12 DIAGNOSIS — I25.10 CORONARY ARTERY DISEASE INVOLVING NATIVE CORONARY ARTERY OF NATIVE HEART WITHOUT ANGINA PECTORIS: ICD-10-CM

## 2020-11-12 DIAGNOSIS — M30.1 EOSINOPHILIC GRANULOMATOSIS WITH POLYANGIITIS (EGPA) (HCC): ICD-10-CM

## 2020-11-12 DIAGNOSIS — K21.9 CHRONIC GERD: ICD-10-CM

## 2020-11-12 DIAGNOSIS — D72.18 EOSINOPHILIC GRANULOMATOSIS WITH POLYANGIITIS (EGPA) (HCC): Primary | ICD-10-CM

## 2020-11-12 LAB — HBA1C MFR BLD: 6.1 %

## 2020-11-12 PROCEDURE — 83036 HEMOGLOBIN GLYCOSYLATED A1C: CPT | Performed by: INTERNAL MEDICINE

## 2020-11-12 PROCEDURE — 25010000002 MEPOLIZUMAB 100 MG RECONSTITUTED SOLUTION: Performed by: INTERNAL MEDICINE

## 2020-11-12 PROCEDURE — 96372 THER/PROPH/DIAG INJ SC/IM: CPT

## 2020-11-12 PROCEDURE — 99214 OFFICE O/P EST MOD 30 MIN: CPT | Performed by: INTERNAL MEDICINE

## 2020-11-12 RX ADMIN — MEPOLIZUMAB 100 MG: 100 INJECTION, POWDER, FOR SOLUTION SUBCUTANEOUS at 13:24

## 2020-11-12 NOTE — PROGRESS NOTES
[facility]     Uzair Jacob  1945   [id number]      Patient Care Team:  Simon Easley MD as PCP - General  Vic Blas MD as Consulting Physician (Cardiology)  Sukhdev Sun MD (Dermatology)  Jaspreet Quiroz MD as Emergency Attending (Pulmonary Disease)  Joey Elizalde MD as Consulting Physician (Ophthalmology)    Chief Complaint::   Chief Complaint   Patient presents with   • Hypertension   • Hyperlipidemia   • Coronary Artery Disease        HPI  Mr. Jacob is now 75 years old.  He comes in for follow-up of his hypertension, hyperlipidemia, GERD and coronary artery disease.  Last year he was experiencing increased cough and shortness of breath and on PFTs was found to have severe obstruction.  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  He is now on nucala, and has had a very good response.  He continues to follow-up with pulmonary.  Overall he feels very well but admits that that during the pandemic he has not been as active, has not eaten well, and as a result has gained weight.  There is no fever, cough, shortness of breath or chest pain.    Chronic Conditions:      Patient Active Problem List   Diagnosis   • CAD (coronary artery disease)   • PAD (peripheral artery disease) (CMS/[facility])   • Essential hypertension   • Mixed hyperlipidemia   • Angina pectoris (CMS/HCC)   • Peripheral vascular disease (CMS/HCC)   • Edema of right lower extremity   • Annual physical exam   • Alcohol use   • Preoperative examination   • Sinus bradycardia, persistent   • Postphlebitic syndrome   • GERD   • Perennial rhinitis   • Non-smoker   • Severe persistent asthma   • 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) (CMS/[facility])   • Bilateral pulmonary infiltrates   • Screening PSA (prostate specific antigen)   • Medicare annual wellness visit, subsequent        Past Medical History:   Diagnosis  Date   • Asthma 3/8/2017   • CAD (coronary artery disease) 3/8/2017   • Chest pain     stent to LAD 2012   • Deep vein thrombosis (DVT) of right lower extremity (CMS/[facility]) 4/18/2019   • GERD (gastroesophageal reflux disease) 3/8/2017   • Hyperlipidemia 3/8/2017   • Hypertension 3/8/2017   • Nasal polyps     nasal polypectomy 1996   • PAD (peripheral artery disease) (CMS/[facility]) 3/8/2017       Past Surgical History:   Procedure Laterality Date   • CORONARY ANGIOPLASTY WITH STENT PLACEMENT  03/2012    Chest pain; stent to LAD   • NASAL POLYP SURGERY  1996    Nasal polyps   • POLYPECTOMY      Sinus polyp extraction.   • TONSILLECTOMY         Family History   Problem Relation Age of Onset   • Esophageal cancer Father         cause of death       Social History     Socioeconomic History   • Marital status:      Spouse name: Not on file   • Number of children: Not on file   • Years of education: Not on file   • Highest education level: Not on file   Tobacco Use   • Smoking status: Never Smoker   • Smokeless tobacco: Never Used   Substance and Sexual Activity   • Alcohol use: Yes     Frequency: Monthly or less     Drinks per session: 1 or 2     Binge frequency: Never   • Drug use: No   • Sexual activity: Defer   Social History Narrative        Retired     Lifelong non-smoker    Drinks about 1 alcoholic beverage on a weekly basis       No Known Allergies      Current Outpatient Medications:   •  albuterol sulfate HFA (VENTOLIN HFA) 108 (90 Base) MCG/ACT inhaler, Inhale 2 puffs Every 4 (Four) Hours As Needed for Wheezing., Disp: 18 inhaler, Rfl: 5  •  aspirin 325 MG tablet, Take 325 mg by mouth Daily., Disp: , Rfl:   •  atorvastatin (LIPITOR) 20 MG tablet, TAKE 1 TABLET BY MOUTH EVERYDAY AT BEDTIME, Disp: 90 tablet, Rfl: 1  •  budesonide-formoterol (SYMBICORT) 160-4.5 MCG/ACT inhaler, Inhale 2 puffs 2 (Two) Times a Day., Disp: 1 inhaler, Rfl: 11  •  BYSTOLIC 10 MG tablet, TAKE 1 TABLET BY MOUTH  "EVERY DAY, Disp: 90 tablet, Rfl: 3  •  chlorthalidone (HYGROTON) 25 MG tablet, TAKE 1/2 TABLET DAILY., Disp: 45 tablet, Rfl: 3  •  fexofenadine (ALLEGRA) 180 MG tablet, Take 180 mg by mouth Daily As Needed., Disp: , Rfl:   •  lisinopril (PRINIVIL,ZESTRIL) 10 MG tablet, TAKE 1 TABLET BY MOUTH EVERY DAY, Disp: 90 tablet, Rfl: 3  •  Mepolizumab (Nucala) 100 MG/ML solution prefilled syringe, Inject 1 mL under the skin into the appropriate area as directed Every 28 (Twenty-Eight) Days., Disp: 1 mL, Rfl: 11  •  omeprazole (priLOSEC) 20 MG capsule, Take 20 mg by mouth Daily., Disp: , Rfl:   •  Triamcinolone Acetonide (NASACORT) 55 MCG/ACT nasal inhaler, 2 sprays into the nostril(s) as directed by provider 2 (Two) Times a Day., Disp: , Rfl:     Review of Systems   Constitutional: Negative for chills, fatigue and fever.   HENT: Negative for congestion, ear pain and sinus pressure.    Respiratory: Negative for cough, chest tightness, shortness of breath and wheezing.    Cardiovascular: Negative for chest pain and palpitations.   Gastrointestinal: Negative for abdominal pain, blood in stool and constipation.   Skin: Negative for color change.   Allergic/Immunologic: Negative for environmental allergies.   Neurological: Negative for dizziness, speech difficulty and headache.   Psychiatric/Behavioral: Negative for decreased concentration. The patient is not nervous/anxious.         Vital Signs  Vitals:    11/12/20 0956   BP: 142/80   BP Location: Left arm   Patient Position: Sitting   Cuff Size: Adult   Pulse: 64   Temp: 96.9 °F (36.1 °C)   Weight: 83.6 kg (184 lb 3.2 oz)   Height: 172.7 cm (67.99\")   PainSc: 0-No pain       Physical Exam  Vitals signs and nursing note reviewed.   Constitutional:       Appearance: He is well-developed.   HENT:      Head: Normocephalic and atraumatic.      Right Ear: External ear normal.      Left Ear: External ear normal.      Nose: Nose normal.      Mouth/Throat:      Pharynx: No oropharyngeal " exudate.   Eyes:      Conjunctiva/sclera: Conjunctivae normal.      Pupils: Pupils are equal, round, and reactive to light.   Neck:      Musculoskeletal: Normal range of motion and neck supple.      Thyroid: No thyromegaly.      Vascular: No JVD.   Cardiovascular:      Rate and Rhythm: Normal rate and regular rhythm.      Heart sounds: Normal heart sounds. No murmur. No friction rub. No gallop.    Pulmonary:      Effort: Pulmonary effort is normal. No respiratory distress.      Breath sounds: Normal breath sounds. No wheezing or rales.   Chest:      Chest wall: No tenderness.   Abdominal:      General: Bowel sounds are normal. There is no distension.      Palpations: Abdomen is soft. There is no mass.      Tenderness: There is no abdominal tenderness. There is no guarding or rebound.      Hernia: No hernia is present.   Musculoskeletal: Normal range of motion.         General: No tenderness.   Lymphadenopathy:      Cervical: No cervical adenopathy.   Skin:     General: Skin is warm and dry.      Findings: No erythema or rash.   Neurological:      Mental Status: He is alert and oriented to person, place, and time.      Cranial Nerves: No cranial nerve deficit.      Sensory: No sensory deficit.      Motor: No abnormal muscle tone.      Coordination: Coordination normal.      Deep Tendon Reflexes: Reflexes normal.   Psychiatric:         Behavior: Behavior normal.         Thought Content: Thought content normal.         Judgment: Judgment normal.          Procedures    ACE III MINI             Assessment/Plan:    Diagnoses and all orders for this visit:    1. Essential hypertension (Primary)    2. Abnormal glucose  -     POC Glycosylated Hemoglobin (Hb A1C)    3. Mixed hyperlipidemia    4. Coronary artery disease involving native coronary artery of native heart without angina pectoris    5. 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) (CMS/[facility])    6. GERD    Plan    Blood pressure is well controlled on  lisinopril, chlorthalidone and bisoprolol.    Fasting glucose was 112.  A1c performed today is 6.1.  We discussed the fact that he has prediabetes and is at risk for developing full-blown diabetes if he does not improve his diet and lose weight.  I encouraged him to reduce simple carbohydrates, lose 5 to 10 pounds and will check again in 6 months.    Lipids are very well controlled on atorvastatin.    Coronary artery disease is asymptomatic on lisinopril, bisoprolol, atorvastatin and aspirin.    He will follow-up with pulmonary for his eosinophilic granulomatosis.    He will continue omeprazole for his esophageal reflux.      Plan of care reviewed with patient at the conclusion of today's visit. Education was provided regarding diagnosis, management, and any prescribed or recommended OTC medications.Patient verbalizes understanding of and agreement with management plan.         Simon Easley MD

## 2020-11-18 RX ORDER — NEBIVOLOL HYDROCHLORIDE 10 MG/1
TABLET ORAL
Qty: 90 TABLET | Refills: 3 | Status: SHIPPED | OUTPATIENT
Start: 2020-11-18 | End: 2021-11-16

## 2020-12-04 ENCOUNTER — OFFICE VISIT (OUTPATIENT)
Dept: PULMONOLOGY | Facility: CLINIC | Age: 75
End: 2020-12-04

## 2020-12-04 VITALS
WEIGHT: 179 LBS | HEIGHT: 68 IN | OXYGEN SATURATION: 98 % | HEART RATE: 52 BPM | TEMPERATURE: 97.4 F | SYSTOLIC BLOOD PRESSURE: 120 MMHG | DIASTOLIC BLOOD PRESSURE: 70 MMHG | BODY MASS INDEX: 27.13 KG/M2

## 2020-12-04 DIAGNOSIS — D72.18 EOSINOPHILIC GRANULOMATOSIS WITH POLYANGIITIS (EGPA) (HCC): Primary | ICD-10-CM

## 2020-12-04 DIAGNOSIS — M30.1 EOSINOPHILIC GRANULOMATOSIS WITH POLYANGIITIS (EGPA) (HCC): Primary | ICD-10-CM

## 2020-12-04 DIAGNOSIS — R91.8 BILATERAL PULMONARY INFILTRATES ON CHEST X-RAY: ICD-10-CM

## 2020-12-04 DIAGNOSIS — J30.2 SEASONAL AND PERENNIAL ALLERGIC RHINITIS: ICD-10-CM

## 2020-12-04 DIAGNOSIS — J45.909 IDIOPATHIC ASTHMA: ICD-10-CM

## 2020-12-04 DIAGNOSIS — K21.9 CHRONIC GERD: ICD-10-CM

## 2020-12-04 DIAGNOSIS — J30.89 SEASONAL AND PERENNIAL ALLERGIC RHINITIS: ICD-10-CM

## 2020-12-04 DIAGNOSIS — Z78.9 NON-SMOKER: ICD-10-CM

## 2020-12-04 PROCEDURE — 99214 OFFICE O/P EST MOD 30 MIN: CPT | Performed by: INTERNAL MEDICINE

## 2020-12-04 NOTE — PROGRESS NOTES
PULMONARY  NOTE    Chief Complaint     Chronic persistent asthma, probable EGPA, perennial rhinitis, non-smoker, pulmonary infiltrates    History of Present Illness     75-year-old male returns today for follow-up  I last saw him 8/13/2020    He has severe obstructive airways disease presumably due to chronic persistent asthma.  He is a lifelong non-smoker  He had an atypical ANCA and elevated eosinophil count.  Combined with bilateral pulmonary infiltrates was felt to be consistent with a EGPA.  He has been on Nucala with significant radiographic and symptomatic improvement    He also remains on Symbicort with albuterol as needed.  He is not requiring albuterol very often    He typically has had perennial rhinitis with sinus drainage and postnasal drip.  The symptoms have improved on his current medical regimen.  He still uses OTC medications periodically.    He has a history of reflux and tries to follow reflux precautions    Patient Active Problem List   Diagnosis   • CAD (coronary artery disease)   • PAD (peripheral artery disease) (CMS/[facility])   • Essential hypertension   • Mixed hyperlipidemia   • Angina pectoris (CMS/[facility])   • Peripheral vascular disease (CMS/[facility])   • Edema of right lower extremity   • Annual physical exam   • Alcohol use   • Preoperative examination   • Sinus bradycardia, persistent   • Postphlebitic syndrome   • GERD   • Perennial rhinitis   • Non-smoker   • 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) (CMS/[facility])   • Bilateral pulmonary infiltrates   • Screening PSA (prostate specific antigen)   • Medicare annual wellness visit, subsequent   • Chronic persistent asthma     No Known Allergies    Current Outpatient Medications:   •  albuterol sulfate HFA (VENTOLIN HFA) 108 (90 Base) MCG/ACT inhaler, Inhale 2 puffs Every 4 (Four) Hours As Needed for Wheezing., Disp: 18 inhaler, Rfl: 5  •  aspirin 325 MG tablet, Take 325 mg by mouth Daily., Disp: , Rfl:   •  atorvastatin (LIPITOR) 20  "MG tablet, TAKE 1 TABLET BY MOUTH EVERYDAY AT BEDTIME, Disp: 90 tablet, Rfl: 1  •  budesonide-formoterol (SYMBICORT) 160-4.5 MCG/ACT inhaler, Inhale 2 puffs 2 (Two) Times a Day., Disp: 1 inhaler, Rfl: 11  •  Bystolic 10 MG tablet, TAKE 1 TABLET BY MOUTH EVERY DAY, Disp: 90 tablet, Rfl: 3  •  chlorthalidone (HYGROTON) 25 MG tablet, TAKE 1/2 TABLET DAILY., Disp: 45 tablet, Rfl: 3  •  fexofenadine (ALLEGRA) 180 MG tablet, Take 180 mg by mouth Daily As Needed., Disp: , Rfl:   •  lisinopril (PRINIVIL,ZESTRIL) 10 MG tablet, TAKE 1 TABLET BY MOUTH EVERY DAY, Disp: 90 tablet, Rfl: 3  •  Mepolizumab (Nucala) 100 MG/ML solution prefilled syringe, Inject 1 mL under the skin into the appropriate area as directed Every 28 (Twenty-Eight) Days., Disp: 1 mL, Rfl: 11  •  omeprazole (priLOSEC) 20 MG capsule, Take 20 mg by mouth Daily., Disp: , Rfl:   •  Triamcinolone Acetonide (NASACORT) 55 MCG/ACT nasal inhaler, 2 sprays into the nostril(s) as directed by provider 2 (Two) Times a Day., Disp: , Rfl:   MEDICATION LIST AND ALLERGIES REVIEWED.    Family History   Problem Relation Age of Onset   • Esophageal cancer Father         cause of death     Social History     Tobacco Use   • Smoking status: Never Smoker   • Smokeless tobacco: Never Used   Substance Use Topics   • Alcohol use: Yes     Frequency: Monthly or less     Drinks per session: 1 or 2     Binge frequency: Never   • Drug use: No     Social History     Social History Narrative        Retired     Lifelong non-smoker    Drinks about 1 alcoholic beverage on a weekly basis     FAMILY AND SOCIAL HISTORY REVIEWED.    Review of Systems  ALSO REFER TO SCANNED ROS SHEET FROM SAME DATE.    /70   Pulse 52   Temp 97.4 °F (36.3 °C)   Ht 172.7 cm (68\")   Wt 81.2 kg (179 lb)   SpO2 98% Comment: resting, room air  BMI 27.22 kg/m²   Physical Exam  Vitals signs and nursing note reviewed.   Constitutional:       General: He is not in acute distress.     " Appearance: He is well-developed. He is not diaphoretic.   HENT:      Head: Normocephalic and atraumatic.   Neck:      Thyroid: No thyromegaly.   Cardiovascular:      Rate and Rhythm: Normal rate and regular rhythm.      Heart sounds: Normal heart sounds. No murmur.   Pulmonary:      Effort: Pulmonary effort is normal.      Breath sounds: Normal breath sounds. No stridor.   Abdominal:      General: Bowel sounds are normal.      Palpations: Abdomen is soft.   Musculoskeletal: Normal range of motion.      Right lower leg: No edema.      Left lower leg: No edema.   Lymphadenopathy:      Cervical: No cervical adenopathy.      Upper Body:      Right upper body: No supraclavicular or epitrochlear adenopathy.      Left upper body: No supraclavicular or epitrochlear adenopathy.   Skin:     General: Skin is warm and dry.   Neurological:      Mental Status: He is alert and oriented to person, place, and time.   Psychiatric:         Behavior: Behavior normal.         Results     Most recent CT scan of the chest from 8/5/2020 reviewed again on PACS.  Interval improvement in pulmonary infiltrates    Problem List       ICD-10-CM ICD-9-CM   1. 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 Chronic persistent asthma  J45.909 493.90   3. GERD  K21.9 530.81   4. Non-smoker  Z78.9 V49.89   5. Bilateral pulmonary infiltrates  R91.8 793.19   6. Perennial rhinitis  J30.89 477.9    J30.2        Discussion     Overall he continues to do well on his current medical regimen.  He is going to remain on Nucala, Symbicort, and albuterol on an as-needed basis.  We talked about decreasing his Symbicort to the 80 dose but at this point he wants to stay on his current regimen.    Perennial rhinitis symptoms are improved as well    We discussed doing additional chest imaging in the spring to document clearing of his pulmonary infiltrates    I will plan to see him back in 4 months with repeat PFTs and  chest x-ray    Jaspreet Quiroz MD  Note electronically signed    CC: Simon Easley MD

## 2020-12-10 ENCOUNTER — INFUSION (OUTPATIENT)
Dept: ONCOLOGY | Facility: HOSPITAL | Age: 75
End: 2020-12-10

## 2020-12-10 VITALS
RESPIRATION RATE: 18 BRPM | TEMPERATURE: 97.8 F | DIASTOLIC BLOOD PRESSURE: 78 MMHG | SYSTOLIC BLOOD PRESSURE: 129 MMHG | HEART RATE: 74 BPM

## 2020-12-10 DIAGNOSIS — M30.1 EOSINOPHILIC GRANULOMATOSIS WITH POLYANGIITIS (EGPA) (HCC): Primary | ICD-10-CM

## 2020-12-10 DIAGNOSIS — D72.18 EOSINOPHILIC GRANULOMATOSIS WITH POLYANGIITIS (EGPA) (HCC): Primary | ICD-10-CM

## 2020-12-10 PROCEDURE — 25010000002 MEPOLIZUMAB 100 MG RECONSTITUTED SOLUTION: Performed by: INTERNAL MEDICINE

## 2020-12-10 PROCEDURE — 96372 THER/PROPH/DIAG INJ SC/IM: CPT

## 2020-12-10 RX ADMIN — MEPOLIZUMAB 100 MG: 100 INJECTION, POWDER, FOR SOLUTION SUBCUTANEOUS at 14:13

## 2020-12-17 DIAGNOSIS — J45.909 IDIOPATHIC ASTHMA: ICD-10-CM

## 2020-12-17 RX ORDER — BUDESONIDE AND FORMOTEROL FUMARATE DIHYDRATE 160; 4.5 UG/1; UG/1
2 AEROSOL RESPIRATORY (INHALATION)
Qty: 1 INHALER | Refills: 11 | Status: SHIPPED | OUTPATIENT
Start: 2020-12-17 | End: 2021-12-06

## 2020-12-21 ENCOUNTER — DOCUMENTATION (OUTPATIENT)
Dept: PULMONOLOGY | Facility: CLINIC | Age: 75
End: 2020-12-21

## 2021-01-04 RX ORDER — ATORVASTATIN CALCIUM 20 MG/1
TABLET, FILM COATED ORAL
Qty: 90 TABLET | Refills: 1 | Status: SHIPPED | OUTPATIENT
Start: 2021-01-04 | End: 2021-06-08

## 2021-01-05 ENCOUNTER — OFFICE VISIT (OUTPATIENT)
Dept: CARDIOLOGY | Facility: CLINIC | Age: 76
End: 2021-01-05

## 2021-01-05 VITALS
WEIGHT: 171 LBS | BODY MASS INDEX: 25.91 KG/M2 | SYSTOLIC BLOOD PRESSURE: 140 MMHG | DIASTOLIC BLOOD PRESSURE: 60 MMHG | OXYGEN SATURATION: 97 % | RESPIRATION RATE: 18 BRPM | HEART RATE: 64 BPM | HEIGHT: 68 IN

## 2021-01-05 DIAGNOSIS — I25.10 CORONARY ARTERY DISEASE INVOLVING NATIVE CORONARY ARTERY OF NATIVE HEART WITHOUT ANGINA PECTORIS: Primary | ICD-10-CM

## 2021-01-05 DIAGNOSIS — I10 ESSENTIAL HYPERTENSION: ICD-10-CM

## 2021-01-05 DIAGNOSIS — E78.2 MIXED HYPERLIPIDEMIA: ICD-10-CM

## 2021-01-05 DIAGNOSIS — I73.9 PAD (PERIPHERAL ARTERY DISEASE) (HCC): ICD-10-CM

## 2021-01-05 PROBLEM — I20.9 ANGINA PECTORIS (HCC): Status: RESOLVED | Noted: 2019-04-18 | Resolved: 2021-01-05

## 2021-01-05 PROCEDURE — 99214 OFFICE O/P EST MOD 30 MIN: CPT | Performed by: NURSE PRACTITIONER

## 2021-01-05 NOTE — PROGRESS NOTES
Subjective:     Encounter Date:01/05/2021    Primary Care Physician: Simon Easley MD      Patient ID: Uzair Jacob is a 75 y.o. male.    Chief Complaint: Follow-up coronary artery disease    PROBLEM LIST:  1. Coronary artery disease:  a. Catheterization, March 2012: 70% to 80% mid-LAD with ‘borderline” FFR. 3.5 x 15 Xience. EF normal.   b. 11/6/2019.  Normal stress perfusion and echo  2. Peripheral arterial disease:  a. Asymptomatic abnormal [place].  b. Right 0.7, left NC, April 2012.  3. Hypertension.  4. Hyperlipidemia.  5. DVT, right lower extremity.  1/18.  6. Asthma.  7. Gastroesophageal reflux disease.  8. Tonsillectomy.  9. Sinus polyp extraction.        No Known Allergies      Current Outpatient Medications:   •  albuterol sulfate HFA (VENTOLIN HFA) 108 (90 Base) MCG/ACT inhaler, Inhale 2 puffs Every 4 (Four) Hours As Needed for Wheezing., Disp: 18 inhaler, Rfl: 5  •  aspirin 325 MG tablet, Take 325 mg by mouth Daily., Disp: , Rfl:   •  atorvastatin (LIPITOR) 20 MG tablet, TAKE 1 TABLET BY MOUTH EVERYDAY AT BEDTIME, Disp: 90 tablet, Rfl: 1  •  budesonide-formoterol (SYMBICORT) 160-4.5 MCG/ACT inhaler, Inhale 2 puffs 2 (Two) Times a Day., Disp: 1 inhaler, Rfl: 11  •  Bystolic 10 MG tablet, TAKE 1 TABLET BY MOUTH EVERY DAY, Disp: 90 tablet, Rfl: 3  •  chlorthalidone (HYGROTON) 25 MG tablet, TAKE 1/2 TABLET DAILY., Disp: 45 tablet, Rfl: 3  •  fexofenadine (ALLEGRA) 180 MG tablet, Take 180 mg by mouth Daily As Needed., Disp: , Rfl:   •  lisinopril (PRINIVIL,ZESTRIL) 10 MG tablet, TAKE 1 TABLET BY MOUTH EVERY DAY, Disp: 90 tablet, Rfl: 3  •  Mepolizumab (Nucala) 100 MG/ML solution prefilled syringe, Inject 1 mL under the skin into the appropriate area as directed Every 28 (Twenty-Eight) Days., Disp: 1 mL, Rfl: 11  •  omeprazole (priLOSEC) 20 MG capsule, Take 20 mg by mouth Daily., Disp: , Rfl:   •  Triamcinolone Acetonide (NASACORT) 55 MCG/ACT nasal inhaler, 2 sprays into the nostril(s) as  "directed by provider 2 (Two) Times a Day., Disp: , Rfl:         History of Present Illness    Patient is a 75-year-old  male who we are seeing today for annual follow-up of coronary artery disease.  Since last being seen he notes to overall be doing well.  He is been started on a new therapy for his respiratory issues and notes to be much improved on the biological agent.  Notes that he is able to walk on treadmill without any anginal complaints.  Dyspnea is much improved.  No syncope, near-syncope, edema.  Denies any chest pain, pressure, tightness.  He recently tested positive for borderline diabetes.  Since that time he changed his diet and has lost 20 pounds intentionally.  Denies any claudication symptoms.    The following portions of the patient's history were reviewed and updated as appropriate: allergies, current medications, past family history, past medical history, past social history, past surgical history and problem list.      Social History     Tobacco Use   • Smoking status: Never Smoker   • Smokeless tobacco: Never Used   Substance Use Topics   • Alcohol use: Yes     Frequency: Monthly or less     Drinks per session: 1 or 2     Binge frequency: Never   • Drug use: No         Review of Systems   Constitution: Negative.   Cardiovascular: Negative for chest pain, dyspnea on exertion, leg swelling, palpitations and syncope.   Respiratory: Negative for shortness of breath and wheezing.    Hematologic/Lymphatic: Negative for bleeding problem. Does not bruise/bleed easily.   Skin: Negative for rash.   Musculoskeletal: Negative for muscle weakness and myalgias.   Gastrointestinal: Negative for heartburn, nausea and vomiting.   Neurological: Negative for dizziness, light-headedness, loss of balance and numbness.          Objective:   /60   Pulse 64   Resp 18   Ht 172.7 cm (68\")   Wt 77.6 kg (171 lb)   SpO2 97%   BMI 26.00 kg/m²         Vitals signs reviewed.   Constitutional:       " Appearance: Well-developed.   Neck:      Vascular: No JVD.      Trachea: No tracheal deviation.   Pulmonary:      Effort: Pulmonary effort is normal.      Breath sounds: Normal breath sounds.   Cardiovascular:      Normal rate. Regular rhythm.   Edema:     Peripheral edema absent.   Abdominal:      General: Bowel sounds are normal.      Tenderness: There is no abdominal tenderness.   Musculoskeletal:         General: No deformity.   Neurological:      Mental Status: Alert and oriented to person, place, and time.         Procedures          Assessment:   Assessment/Plan      Diagnoses and all orders for this visit:    1. Coronary artery disease involving native coronary artery of native heart without angina pectoris (Primary).  Stable.  On aspirin.    2. PAD (peripheral artery disease) (CMS/[facility]), stable.  No claudication symptoms.    3. Essential hypertension, controlled.  On beta-blocker.    4. Mixed hyperlipidemia, on statin      Plan:  1. Continue current cardiac medications.  2. Patient overall doing very well from cardiac standpoint.  3. Follow-up in 1 year's time or sooner if needed.       CATERINA Gaitan     Dictated utilizing Dragon dictation

## 2021-01-07 ENCOUNTER — INFUSION (OUTPATIENT)
Dept: ONCOLOGY | Facility: HOSPITAL | Age: 76
End: 2021-01-07

## 2021-01-07 VITALS
RESPIRATION RATE: 16 BRPM | DIASTOLIC BLOOD PRESSURE: 89 MMHG | TEMPERATURE: 97 F | HEART RATE: 57 BPM | SYSTOLIC BLOOD PRESSURE: 133 MMHG

## 2021-01-07 DIAGNOSIS — M30.1 EOSINOPHILIC GRANULOMATOSIS WITH POLYANGIITIS (EGPA) (HCC): Primary | ICD-10-CM

## 2021-01-07 DIAGNOSIS — D72.18 EOSINOPHILIC GRANULOMATOSIS WITH POLYANGIITIS (EGPA) (HCC): Primary | ICD-10-CM

## 2021-01-07 PROCEDURE — 96372 THER/PROPH/DIAG INJ SC/IM: CPT

## 2021-01-07 PROCEDURE — 25010000002 MEPOLIZUMAB 100 MG RECONSTITUTED SOLUTION: Performed by: INTERNAL MEDICINE

## 2021-01-07 RX ADMIN — MEPOLIZUMAB 100 MG: 100 INJECTION, POWDER, FOR SOLUTION SUBCUTANEOUS at 09:10

## 2021-01-11 ENCOUNTER — IMMUNIZATION (OUTPATIENT)
Dept: VACCINE CLINIC | Facility: HOSPITAL | Age: 76
End: 2021-01-11

## 2021-01-11 PROCEDURE — 91300 HC SARSCOV02 VAC 30MCG/0.3ML IM: CPT | Performed by: INTERNAL MEDICINE

## 2021-01-11 PROCEDURE — 0001A: CPT | Performed by: INTERNAL MEDICINE

## 2021-02-01 ENCOUNTER — IMMUNIZATION (OUTPATIENT)
Dept: VACCINE CLINIC | Facility: HOSPITAL | Age: 76
End: 2021-02-01

## 2021-02-01 PROCEDURE — 0002A: CPT | Performed by: INTERNAL MEDICINE

## 2021-02-01 PROCEDURE — 91300 HC SARSCOV02 VAC 30MCG/0.3ML IM: CPT | Performed by: INTERNAL MEDICINE

## 2021-02-04 ENCOUNTER — INFUSION (OUTPATIENT)
Dept: ONCOLOGY | Facility: HOSPITAL | Age: 76
End: 2021-02-04

## 2021-02-04 VITALS
TEMPERATURE: 96.8 F | RESPIRATION RATE: 18 BRPM | DIASTOLIC BLOOD PRESSURE: 67 MMHG | HEART RATE: 53 BPM | SYSTOLIC BLOOD PRESSURE: 128 MMHG

## 2021-02-04 DIAGNOSIS — D72.18 EOSINOPHILIC GRANULOMATOSIS WITH POLYANGIITIS (EGPA) (HCC): Primary | ICD-10-CM

## 2021-02-04 DIAGNOSIS — M30.1 EOSINOPHILIC GRANULOMATOSIS WITH POLYANGIITIS (EGPA) (HCC): Primary | ICD-10-CM

## 2021-02-04 PROCEDURE — 96372 THER/PROPH/DIAG INJ SC/IM: CPT

## 2021-02-04 PROCEDURE — 25010000002 MEPOLIZUMAB 100 MG RECONSTITUTED SOLUTION: Performed by: INTERNAL MEDICINE

## 2021-02-04 RX ADMIN — MEPOLIZUMAB 100 MG: 100 INJECTION, POWDER, FOR SOLUTION SUBCUTANEOUS at 08:58

## 2021-03-04 ENCOUNTER — INFUSION (OUTPATIENT)
Dept: ONCOLOGY | Facility: HOSPITAL | Age: 76
End: 2021-03-04

## 2021-03-04 VITALS
RESPIRATION RATE: 16 BRPM | SYSTOLIC BLOOD PRESSURE: 137 MMHG | HEART RATE: 78 BPM | TEMPERATURE: 96.8 F | DIASTOLIC BLOOD PRESSURE: 78 MMHG

## 2021-03-04 DIAGNOSIS — M30.1 EOSINOPHILIC GRANULOMATOSIS WITH POLYANGIITIS (EGPA) (HCC): Primary | ICD-10-CM

## 2021-03-04 DIAGNOSIS — D72.18 EOSINOPHILIC GRANULOMATOSIS WITH POLYANGIITIS (EGPA) (HCC): Primary | ICD-10-CM

## 2021-03-04 PROCEDURE — 25010000002 MEPOLIZUMAB 100 MG RECONSTITUTED SOLUTION: Performed by: INTERNAL MEDICINE

## 2021-03-04 PROCEDURE — 96372 THER/PROPH/DIAG INJ SC/IM: CPT

## 2021-03-04 RX ADMIN — MEPOLIZUMAB 100 MG: 100 INJECTION, POWDER, FOR SOLUTION SUBCUTANEOUS at 08:22

## 2021-03-18 RX ORDER — CHLORTHALIDONE 25 MG/1
TABLET ORAL
Qty: 45 TABLET | Refills: 3 | Status: SHIPPED | OUTPATIENT
Start: 2021-03-18 | End: 2022-02-28

## 2021-03-22 DIAGNOSIS — Z01.812 BLOOD TESTS PRIOR TO TREATMENT OR PROCEDURE: Primary | ICD-10-CM

## 2021-03-23 ENCOUNTER — LAB (OUTPATIENT)
Dept: PULMONOLOGY | Facility: CLINIC | Age: 76
End: 2021-03-23

## 2021-03-23 DIAGNOSIS — Z01.812 BLOOD TESTS PRIOR TO TREATMENT OR PROCEDURE: ICD-10-CM

## 2021-03-23 PROCEDURE — U0004 COV-19 TEST NON-CDC HGH THRU: HCPCS | Performed by: INTERNAL MEDICINE

## 2021-03-23 PROCEDURE — 99211 OFF/OP EST MAY X REQ PHY/QHP: CPT | Performed by: INTERNAL MEDICINE

## 2021-03-24 LAB — SARS-COV-2 RNA NOSE QL NAA+PROBE: NOT DETECTED

## 2021-03-26 ENCOUNTER — OFFICE VISIT (OUTPATIENT)
Dept: PULMONOLOGY | Facility: CLINIC | Age: 76
End: 2021-03-26

## 2021-03-26 VITALS
HEART RATE: 80 BPM | TEMPERATURE: 97.5 F | BODY MASS INDEX: 25.01 KG/M2 | SYSTOLIC BLOOD PRESSURE: 128 MMHG | WEIGHT: 165 LBS | HEIGHT: 68 IN | DIASTOLIC BLOOD PRESSURE: 78 MMHG | OXYGEN SATURATION: 98 %

## 2021-03-26 DIAGNOSIS — J45.909 IDIOPATHIC ASTHMA: ICD-10-CM

## 2021-03-26 DIAGNOSIS — M30.1 EOSINOPHILIC GRANULOMATOSIS WITH POLYANGIITIS (EGPA) (HCC): ICD-10-CM

## 2021-03-26 DIAGNOSIS — R91.8 BILATERAL PULMONARY INFILTRATES ON CHEST X-RAY: ICD-10-CM

## 2021-03-26 DIAGNOSIS — Z78.9 NON-SMOKER: ICD-10-CM

## 2021-03-26 DIAGNOSIS — D72.18 EOSINOPHILIC GRANULOMATOSIS WITH POLYANGIITIS (EGPA) (HCC): ICD-10-CM

## 2021-03-26 DIAGNOSIS — K21.9 CHRONIC GERD: ICD-10-CM

## 2021-03-26 DIAGNOSIS — R91.8 BILATERAL PULMONARY INFILTRATES ON CHEST X-RAY: Primary | ICD-10-CM

## 2021-03-26 PROCEDURE — 94726 PLETHYSMOGRAPHY LUNG VOLUMES: CPT | Performed by: INTERNAL MEDICINE

## 2021-03-26 PROCEDURE — 99214 OFFICE O/P EST MOD 30 MIN: CPT | Performed by: INTERNAL MEDICINE

## 2021-03-26 PROCEDURE — 94060 EVALUATION OF WHEEZING: CPT | Performed by: INTERNAL MEDICINE

## 2021-03-26 PROCEDURE — 94729 DIFFUSING CAPACITY: CPT | Performed by: INTERNAL MEDICINE

## 2021-03-26 RX ORDER — ALBUTEROL SULFATE 90 UG/1
4 AEROSOL, METERED RESPIRATORY (INHALATION) ONCE
Status: COMPLETED | OUTPATIENT
Start: 2021-03-26 | End: 2021-03-26

## 2021-03-26 RX ADMIN — ALBUTEROL SULFATE 4 PUFF: 90 AEROSOL, METERED RESPIRATORY (INHALATION) at 12:19

## 2021-03-26 NOTE — PROGRESS NOTES
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  He is a lifelong non-smoker.  He had positive atypical ANCA and elevated eosinophil count.  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  He has been on new Ivy now since last year with significant radiographic and symptomatic improvement    He has had no acute exacerbation of symptoms since I last saw him    He has Symbicort with albuterol on as-needed basis which he rarely uses    In the past he has had perennial sinus drainage and postnasal drip.  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) (CMS/[facility])   • Bilateral pulmonary infiltrates   • Screening PSA (prostate specific antigen)   • Medicare annual wellness visit, subsequent   • Chronic persistent asthma     No Known Allergies    Current Outpatient Medications:   •  albuterol sulfate HFA (VENTOLIN HFA) 108 (90 Base) MCG/ACT inhaler, Inhale 2 puffs Every 4 (Four) Hours As Needed for Wheezing., Disp: 18 inhaler, Rfl: 5  •  aspirin 325 MG tablet, Take 325 mg by mouth Daily., Disp: , Rfl:   •  atorvastatin (LIPITOR) 20 MG tablet, TAKE 1 TABLET BY MOUTH EVERYDAY AT BEDTIME, Disp: 90  "tablet, Rfl: 1  •  budesonide-formoterol (SYMBICORT) 160-4.5 MCG/ACT inhaler, Inhale 2 puffs 2 (Two) Times a Day., Disp: 1 inhaler, Rfl: 11  •  Bystolic 10 MG tablet, TAKE 1 TABLET BY MOUTH EVERY DAY, Disp: 90 tablet, Rfl: 3  •  chlorthalidone (HYGROTON) 25 MG tablet, TAKE 1/2 TABLET BY MOUTH EVERY DAY, Disp: 45 tablet, Rfl: 3  •  fexofenadine (ALLEGRA) 180 MG tablet, Take 180 mg by mouth Daily As Needed., Disp: , Rfl:   •  lisinopril (PRINIVIL,ZESTRIL) 10 MG tablet, TAKE 1 TABLET BY MOUTH EVERY DAY, Disp: 90 tablet, Rfl: 3  •  Mepolizumab (Nucala) 100 MG/ML solution prefilled syringe, Inject 1 mL under the skin into the appropriate area as directed Every 28 (Twenty-Eight) Days., Disp: 1 mL, Rfl: 11  •  omeprazole (priLOSEC) 20 MG capsule, Take 20 mg by mouth Daily., Disp: , Rfl:   •  Triamcinolone Acetonide (NASACORT) 55 MCG/ACT nasal inhaler, 2 sprays into the nostril(s) as directed by provider 2 (Two) Times a Day., Disp: , Rfl:   No current facility-administered medications for this visit.  MEDICATION LIST AND ALLERGIES REVIEWED.    Family History   Problem Relation Age of Onset   • Esophageal cancer Father         cause of death     Social History     Tobacco Use   • Smoking status: Never Smoker   • Smokeless tobacco: Never Used   Substance Use Topics   • Alcohol use: Yes   • Drug use: No     Social History     Social History Narrative        Retired     Lifelong non-smoker    Drinks about 1 alcoholic beverage on a weekly basis     FAMILY AND SOCIAL HISTORY REVIEWED.    Review of Systems  ALSO REFER TO SCANNED ROS SHEET FROM SAME DATE.    /78   Pulse 80   Temp 97.5 °F (36.4 °C)   Ht 172.7 cm (68\")   Wt 74.8 kg (165 lb)   SpO2 98% Comment: room air at rest  BMI 25.09 kg/m²   Physical Exam  Vitals and nursing note reviewed.   Constitutional:       General: He is not in acute distress.     Appearance: He is well-developed. He is not diaphoretic.   HENT:      Head: " Normocephalic and atraumatic.   Neck:      Thyroid: No thyromegaly.   Cardiovascular:      Rate and Rhythm: Normal rate and regular rhythm.      Heart sounds: Normal heart sounds. No murmur heard.     Pulmonary:      Effort: Pulmonary effort is normal.      Breath sounds: Normal breath sounds. No stridor.   Abdominal:      General: Bowel sounds are normal.      Palpations: Abdomen is soft.   Musculoskeletal:         General: Normal range of motion.      Right lower leg: No edema.      Left lower leg: No edema.   Lymphadenopathy:      Cervical: No cervical adenopathy.      Upper Body:      Right upper body: No supraclavicular or epitrochlear adenopathy.      Left upper body: No supraclavicular or epitrochlear adenopathy.   Skin:     General: Skin is warm and dry.   Neurological:      Mental Status: He is alert and oriented to person, place, and time.   Psychiatric:         Behavior: Behavior normal.         Results     PFTs reveal moderate airway obstruction without significant improvement in FEV1 after bronchodilator.  No restriction and normal diffusion capacity  FEV1 significantly improved in comparison to August 2020    Panel chest x-ray reveals no effusions, infiltrates, or consolidation  Immunization History   Administered Date(s) Administered   • COVID-19 (PFIZER) 01/11/2021, 02/01/2021   • FLUAD TRI 65YR+ 10/21/2019, 09/30/2020   • Flu Vaccine Quad PF >18YRS 10/30/2015   • Fluzone High Dose =>65 Years ([employer] ONLY) 10/01/2014, 12/14/2016, 10/10/2017, 11/30/2017, 10/01/2018, 10/21/2019, 09/30/2020   • Hepatitis A 11/06/2018, 04/06/2019, 05/03/2019   • Pneumococcal Conjugate 13-Valent (PCV13) 12/17/2014   • Pneumococcal Polysaccharide (PPSV23) 06/25/2013   • Pneumococcal, Unspecified 06/25/2013   • Shingrix 03/18/2020, 05/01/2020, 06/10/2020   • Tdap 06/25/2013   • Zostavax 06/20/2012     Problem List       ICD-10-CM ICD-9-CM   1. Bilateral pulmonary infiltrates  R91.8 793.19   2. Bilateral pulmonary  infiltrates on chest x-ray  R91.8 793.19   3. 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 Non-smoker  Z78.9 V49.89   5. GERD  K21.9 530.81   6. Chronic persistent asthma  J45.909 493.90       Discussion     He appears to be doing well on his new Ivy treatment for EGPA.  No recent exacerbation of asthma or respiratory symptoms  In addition, he will remain on Symbicort with albuterol on an as-needed basis    PFTs are significantly better  Chest x-ray reveals no new infiltrates or changes    I have encouraged reflux precautions    He will primarily use OTC medications for his sinus symptoms    I will plan to see him back in 6 months or earlier if there are any problems in the meantime    Moderate level of Medical Decision Making complexity based on 2 or more chronic stable illnesses and prescription drug management.    Jaspreet Quiroz MD  Note electronically signed    CC: Simon Easley MD

## 2021-04-01 ENCOUNTER — INFUSION (OUTPATIENT)
Dept: ONCOLOGY | Facility: HOSPITAL | Age: 76
End: 2021-04-01

## 2021-04-01 VITALS
SYSTOLIC BLOOD PRESSURE: 128 MMHG | TEMPERATURE: 97.3 F | HEART RATE: 57 BPM | DIASTOLIC BLOOD PRESSURE: 62 MMHG | RESPIRATION RATE: 18 BRPM

## 2021-04-01 DIAGNOSIS — M30.1 EOSINOPHILIC GRANULOMATOSIS WITH POLYANGIITIS (EGPA) (HCC): Primary | ICD-10-CM

## 2021-04-01 DIAGNOSIS — D72.18 EOSINOPHILIC GRANULOMATOSIS WITH POLYANGIITIS (EGPA) (HCC): Primary | ICD-10-CM

## 2021-04-01 PROCEDURE — 96372 THER/PROPH/DIAG INJ SC/IM: CPT

## 2021-04-01 PROCEDURE — 25010000002 MEPOLIZUMAB 100 MG RECONSTITUTED SOLUTION: Performed by: INTERNAL MEDICINE

## 2021-04-01 RX ADMIN — MEPOLIZUMAB 100 MG: 100 INJECTION, POWDER, FOR SOLUTION SUBCUTANEOUS at 09:22

## 2021-04-29 ENCOUNTER — INFUSION (OUTPATIENT)
Dept: ONCOLOGY | Facility: HOSPITAL | Age: 76
End: 2021-04-29

## 2021-04-29 VITALS
TEMPERATURE: 97.2 F | DIASTOLIC BLOOD PRESSURE: 90 MMHG | HEART RATE: 74 BPM | RESPIRATION RATE: 16 BRPM | SYSTOLIC BLOOD PRESSURE: 142 MMHG

## 2021-04-29 DIAGNOSIS — M30.1 EOSINOPHILIC GRANULOMATOSIS WITH POLYANGIITIS (EGPA) (HCC): Primary | ICD-10-CM

## 2021-04-29 DIAGNOSIS — D72.18 EOSINOPHILIC GRANULOMATOSIS WITH POLYANGIITIS (EGPA) (HCC): Primary | ICD-10-CM

## 2021-04-29 PROCEDURE — 25010000002 MEPOLIZUMAB 100 MG RECONSTITUTED SOLUTION: Performed by: INTERNAL MEDICINE

## 2021-04-29 PROCEDURE — 96372 THER/PROPH/DIAG INJ SC/IM: CPT

## 2021-04-29 RX ADMIN — MEPOLIZUMAB 100 MG: 100 INJECTION, POWDER, FOR SOLUTION SUBCUTANEOUS at 08:40

## 2021-05-12 ENCOUNTER — OFFICE VISIT (OUTPATIENT)
Dept: INTERNAL MEDICINE | Facility: CLINIC | Age: 76
End: 2021-05-12

## 2021-05-12 VITALS
SYSTOLIC BLOOD PRESSURE: 140 MMHG | BODY MASS INDEX: 23.82 KG/M2 | WEIGHT: 157.2 LBS | HEART RATE: 64 BPM | HEIGHT: 68 IN | TEMPERATURE: 98.2 F | DIASTOLIC BLOOD PRESSURE: 82 MMHG

## 2021-05-12 DIAGNOSIS — R73.09 ABNORMAL GLUCOSE: ICD-10-CM

## 2021-05-12 DIAGNOSIS — J30.2 SEASONAL AND PERENNIAL ALLERGIC RHINITIS: ICD-10-CM

## 2021-05-12 DIAGNOSIS — I10 ESSENTIAL HYPERTENSION: Primary | ICD-10-CM

## 2021-05-12 DIAGNOSIS — E78.2 MIXED HYPERLIPIDEMIA: ICD-10-CM

## 2021-05-12 DIAGNOSIS — J30.89 SEASONAL AND PERENNIAL ALLERGIC RHINITIS: ICD-10-CM

## 2021-05-12 LAB — HBA1C MFR BLD: 6 %

## 2021-05-12 PROCEDURE — 83036 HEMOGLOBIN GLYCOSYLATED A1C: CPT | Performed by: INTERNAL MEDICINE

## 2021-05-12 PROCEDURE — 3044F HG A1C LEVEL LT 7.0%: CPT | Performed by: INTERNAL MEDICINE

## 2021-05-12 PROCEDURE — 99214 OFFICE O/P EST MOD 30 MIN: CPT | Performed by: INTERNAL MEDICINE

## 2021-05-12 NOTE — PROGRESS NOTES
[facility]     Uzair Jacob  1945   [id number]      Patient Care Team:  Simon Easley MD as PCP - General  Vic Blas MD as Consulting Physician (Cardiology)  Sukhdev Sun MD (Dermatology)  Jaspreet Quiroz MD as Emergency Attending (Pulmonary Disease)  Joey Elizalde MD as Consulting Physician (Ophthalmology)    Chief Complaint::   Chief Complaint   Patient presents with   • Hyperlipidemia   • Hypertension        HPI  Mr. Jacob comes in for follow-up of his hypertension, hyperlipidemia, abnormal glucose and allergic rhinitis.  He is followed by cardiology for coronary artery disease which has been very stable.  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  He is receiving Nucala, and has noticed a significant improvement in his exercise capacity at cardiac rehab and states that subjectively he can take a deeper breath.  Today he has cough and congestion which she attributes to allergies.  He stopped taking Allegra because he did not think it was helping.  He still uses his inhalers.  Last visit he had a high fasting glucose and a slightly elevated A1c at 6.1.  Since then he has basically eliminated carbohydrates from his diet, he has lost 25pounds is very curious about whether he has brought his glucose down.  He is checking his glucose regularly by fingerstick and notes that it is usually slightly over 100.  He is fully vaccinated against COVID-19, there is no fever or chest pain.    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) (CMS/[facility])   • Bilateral pulmonary infiltrates   • Screening PSA (prostate specific antigen)   • Medicare annual wellness visit, subsequent   • Chronic persistent asthma   • Abnormal glucose        Past Medical History:   Diagnosis Date   • Asthma 3/8/2017   • CAD (coronary artery disease) 3/8/2017   • Chest pain     stent to LAD 2012   • Deep vein thrombosis (DVT) of right lower extremity (CMS/[facility]) 4/18/2019   • GERD (gastroesophageal reflux disease) 3/8/2017   • Hyperlipidemia 3/8/2017   • Hypertension 3/8/2017   • Nasal polyps     nasal polypectomy 1996   • PAD (peripheral artery disease) (CMS/[facility]) 3/8/2017       Past Surgical History:   Procedure Laterality Date   • CORONARY ANGIOPLASTY WITH STENT PLACEMENT  03/2012    Chest pain; stent to LAD   • NASAL POLYP SURGERY  1996    Nasal polyps   • POLYPECTOMY      Sinus polyp extraction.   • TONSILLECTOMY         Family History   Problem Relation Age of Onset   • Esophageal cancer Father         cause of death       Social History     Socioeconomic History   • Marital status:      Spouse name: Not on file   • Number of children: Not on file   • Years of education: Not on file   • Highest education level: Not on file   Tobacco Use   • Smoking status: Never Smoker   • Smokeless tobacco: Never Used   Substance and Sexual Activity   • Alcohol use: Yes   • Drug use: No   • Sexual activity: Defer       No Known Allergies      Current Outpatient Medications:   •  albuterol sulfate HFA (VENTOLIN HFA) 108 (90 Base) MCG/ACT inhaler, Inhale 2 puffs Every 4 (Four) Hours As Needed for Wheezing., Disp: 18 inhaler, Rfl: 5  •  aspirin 325 MG tablet, Take 325 mg by mouth Daily., Disp: , Rfl:   •  atorvastatin (LIPITOR) 20 MG tablet, TAKE 1 TABLET BY MOUTH EVERYDAY AT BEDTIME, Disp: 90 tablet, Rfl: 1  •  budesonide-formoterol (SYMBICORT) 160-4.5 MCG/ACT inhaler, Inhale 2 puffs 2  "(Two) Times a Day., Disp: 1 inhaler, Rfl: 11  •  Bystolic 10 MG tablet, TAKE 1 TABLET BY MOUTH EVERY DAY, Disp: 90 tablet, Rfl: 3  •  chlorthalidone (HYGROTON) 25 MG tablet, TAKE 1/2 TABLET BY MOUTH EVERY DAY, Disp: 45 tablet, Rfl: 3  •  lisinopril (PRINIVIL,ZESTRIL) 10 MG tablet, TAKE 1 TABLET BY MOUTH EVERY DAY, Disp: 90 tablet, Rfl: 3  •  Mepolizumab (Nucala) 100 MG/ML solution prefilled syringe, Inject 1 mL under the skin into the appropriate area as directed Every 28 (Twenty-Eight) Days., Disp: 1 mL, Rfl: 11  •  omeprazole (priLOSEC) 20 MG capsule, Take 20 mg by mouth Daily., Disp: , Rfl:   •  Triamcinolone Acetonide (NASACORT) 55 MCG/ACT nasal inhaler, 2 sprays into the nostril(s) as directed by provider 2 (Two) Times a Day., Disp: , Rfl:     Review of Systems   Constitutional: Negative.    Respiratory: Positive for cough. Negative for chest tightness and shortness of breath.    Cardiovascular: Negative.  Negative for chest pain.   Gastrointestinal: Negative for abdominal pain, blood in stool, constipation and diarrhea.        Vital Signs  Vitals:    05/12/21 0827   BP: 140/82   BP Location: Left arm   Patient Position: Sitting   Cuff Size: Adult   Pulse: 64   Temp: 98.2 °F (36.8 °C)   Weight: 71.3 kg (157 lb 3.2 oz)   Height: 172.7 cm (67.99\")   PainSc: 0-No pain       Physical Exam  Vitals reviewed.   Constitutional:       Appearance: He is well-developed.   HENT:      Head: Normocephalic and atraumatic.   Cardiovascular:      Rate and Rhythm: Normal rate and regular rhythm.      Heart sounds: Normal heart sounds. No murmur heard.     Pulmonary:      Effort: Pulmonary effort is normal.      Breath sounds: Normal breath sounds.   Neurological:      Mental Status: He is alert and oriented to person, place, and time.          Procedures    ACE III MINI             Assessment/Plan:    Diagnoses and all orders for this visit:    1. Essential hypertension (Primary)    2. Abnormal glucose  -     POC Glycosylated " Hemoglobin (Hb A1C)    3. Mixed hyperlipidemia    4. Perennial rhinitis    Plan    Blood pressure is well controlled on Bystolic, lisinopril and chlorthalidone.    A1c is a bit better 6.0.  This is surprising given 25 pound weight loss but on the other hand he certainly has not progressed toward full-blown diabetes.  He will keep up the good work.    Lipid panel is pending, he will continue atorvastatin and healthy diet.    Allergies are flared he will continue Nasacort.    He will continue follow-up with cardiology and pulmonary for coronary artery disease and eosinophilic granulomatosis with polyangiitis.      Plan of care reviewed with patient at the conclusion of today's visit. Education was provided regarding diagnosis, management, and any prescribed or recommended OTC medications.Patient verbalizes understanding of and agreement with management plan.         Simon Easley MD

## 2021-05-13 LAB
ALBUMIN SERPL-MCNC: 4.1 G/DL (ref 3.5–5.2)
ALBUMIN/GLOB SERPL: 2 G/DL
ALP SERPL-CCNC: 54 U/L (ref 39–117)
ALT SERPL-CCNC: 11 U/L (ref 1–41)
AST SERPL-CCNC: 14 U/L (ref 1–40)
BASOPHILS # BLD AUTO: 0.06 10*3/MM3 (ref 0–0.2)
BASOPHILS NFR BLD AUTO: 0.7 % (ref 0–1.5)
BILIRUB SERPL-MCNC: 1.1 MG/DL (ref 0–1.2)
BUN SERPL-MCNC: 14 MG/DL (ref 8–23)
BUN/CREAT SERPL: 13 (ref 7–25)
CALCIUM SERPL-MCNC: 9.3 MG/DL (ref 8.6–10.5)
CHLORIDE SERPL-SCNC: 101 MMOL/L (ref 98–107)
CHOLEST SERPL-MCNC: 146 MG/DL (ref 0–200)
CO2 SERPL-SCNC: 30.7 MMOL/L (ref 22–29)
CREAT SERPL-MCNC: 1.08 MG/DL (ref 0.76–1.27)
EOSINOPHIL # BLD AUTO: 0.02 10*3/MM3 (ref 0–0.4)
EOSINOPHIL NFR BLD AUTO: 0.2 % (ref 0.3–6.2)
ERYTHROCYTE [DISTWIDTH] IN BLOOD BY AUTOMATED COUNT: 12.8 % (ref 12.3–15.4)
GLOBULIN SER CALC-MCNC: 2.1 GM/DL
GLUCOSE SERPL-MCNC: 100 MG/DL (ref 65–99)
HCT VFR BLD AUTO: 45.4 % (ref 37.5–51)
HDLC SERPL-MCNC: 56 MG/DL (ref 40–60)
HGB BLD-MCNC: 15.3 G/DL (ref 13–17.7)
IMM GRANULOCYTES # BLD AUTO: 0.04 10*3/MM3 (ref 0–0.05)
IMM GRANULOCYTES NFR BLD AUTO: 0.5 % (ref 0–0.5)
LDLC SERPL CALC-MCNC: 78 MG/DL (ref 0–100)
LYMPHOCYTES # BLD AUTO: 1.31 10*3/MM3 (ref 0.7–3.1)
LYMPHOCYTES NFR BLD AUTO: 16 % (ref 19.6–45.3)
MCH RBC QN AUTO: 29.9 PG (ref 26.6–33)
MCHC RBC AUTO-ENTMCNC: 33.7 G/DL (ref 31.5–35.7)
MCV RBC AUTO: 88.7 FL (ref 79–97)
MONOCYTES # BLD AUTO: 1 10*3/MM3 (ref 0.1–0.9)
MONOCYTES NFR BLD AUTO: 12.2 % (ref 5–12)
NEUTROPHILS # BLD AUTO: 5.78 10*3/MM3 (ref 1.7–7)
NEUTROPHILS NFR BLD AUTO: 70.4 % (ref 42.7–76)
NRBC BLD AUTO-RTO: 0 /100 WBC (ref 0–0.2)
PLATELET # BLD AUTO: 264 10*3/MM3 (ref 140–450)
POTASSIUM SERPL-SCNC: 3.7 MMOL/L (ref 3.5–5.2)
PROT SERPL-MCNC: 6.2 G/DL (ref 6–8.5)
RBC # BLD AUTO: 5.12 10*6/MM3 (ref 4.14–5.8)
SODIUM SERPL-SCNC: 140 MMOL/L (ref 136–145)
TRIGL SERPL-MCNC: 54 MG/DL (ref 0–150)
VLDLC SERPL CALC-MCNC: 12 MG/DL (ref 5–40)
WBC # BLD AUTO: 8.21 10*3/MM3 (ref 3.4–10.8)

## 2021-05-27 ENCOUNTER — INFUSION (OUTPATIENT)
Dept: ONCOLOGY | Facility: HOSPITAL | Age: 76
End: 2021-05-27

## 2021-05-27 VITALS
RESPIRATION RATE: 16 BRPM | HEART RATE: 91 BPM | TEMPERATURE: 98.6 F | SYSTOLIC BLOOD PRESSURE: 123 MMHG | DIASTOLIC BLOOD PRESSURE: 79 MMHG

## 2021-05-27 DIAGNOSIS — D72.18 EOSINOPHILIC GRANULOMATOSIS WITH POLYANGIITIS (EGPA) (HCC): Primary | ICD-10-CM

## 2021-05-27 DIAGNOSIS — M30.1 EOSINOPHILIC GRANULOMATOSIS WITH POLYANGIITIS (EGPA) (HCC): Primary | ICD-10-CM

## 2021-05-27 PROCEDURE — 25010000002 MEPOLIZUMAB 100 MG RECONSTITUTED SOLUTION: Performed by: INTERNAL MEDICINE

## 2021-05-27 PROCEDURE — 96372 THER/PROPH/DIAG INJ SC/IM: CPT

## 2021-05-27 RX ADMIN — MEPOLIZUMAB 100 MG: 100 INJECTION, POWDER, FOR SOLUTION SUBCUTANEOUS at 09:00

## 2021-06-08 RX ORDER — ATORVASTATIN CALCIUM 20 MG/1
TABLET, FILM COATED ORAL
Qty: 90 TABLET | Refills: 3 | Status: SHIPPED | OUTPATIENT
Start: 2021-06-08 | End: 2022-03-28

## 2021-06-24 ENCOUNTER — INFUSION (OUTPATIENT)
Dept: ONCOLOGY | Facility: HOSPITAL | Age: 76
End: 2021-06-24

## 2021-06-24 VITALS
DIASTOLIC BLOOD PRESSURE: 76 MMHG | HEART RATE: 62 BPM | SYSTOLIC BLOOD PRESSURE: 129 MMHG | RESPIRATION RATE: 18 BRPM | TEMPERATURE: 98.2 F

## 2021-06-24 DIAGNOSIS — M30.1 EOSINOPHILIC GRANULOMATOSIS WITH POLYANGIITIS (EGPA) (HCC): Primary | ICD-10-CM

## 2021-06-24 DIAGNOSIS — D72.18 EOSINOPHILIC GRANULOMATOSIS WITH POLYANGIITIS (EGPA) (HCC): Primary | ICD-10-CM

## 2021-06-24 PROCEDURE — 96372 THER/PROPH/DIAG INJ SC/IM: CPT

## 2021-06-24 PROCEDURE — 25010000002 MEPOLIZUMAB 100 MG RECONSTITUTED SOLUTION: Performed by: INTERNAL MEDICINE

## 2021-06-24 RX ADMIN — MEPOLIZUMAB 100 MG: 100 INJECTION, POWDER, FOR SOLUTION SUBCUTANEOUS at 08:58

## 2021-07-22 ENCOUNTER — INFUSION (OUTPATIENT)
Dept: ONCOLOGY | Facility: HOSPITAL | Age: 76
End: 2021-07-22

## 2021-07-22 VITALS
HEART RATE: 65 BPM | RESPIRATION RATE: 18 BRPM | SYSTOLIC BLOOD PRESSURE: 120 MMHG | DIASTOLIC BLOOD PRESSURE: 58 MMHG | TEMPERATURE: 97.6 F

## 2021-07-22 DIAGNOSIS — M30.1 EOSINOPHILIC GRANULOMATOSIS WITH POLYANGIITIS (EGPA) (HCC): Primary | ICD-10-CM

## 2021-07-22 DIAGNOSIS — D72.18 EOSINOPHILIC GRANULOMATOSIS WITH POLYANGIITIS (EGPA) (HCC): Primary | ICD-10-CM

## 2021-07-22 PROCEDURE — 96372 THER/PROPH/DIAG INJ SC/IM: CPT

## 2021-07-22 PROCEDURE — 25010000002 MEPOLIZUMAB 100 MG RECONSTITUTED SOLUTION: Performed by: INTERNAL MEDICINE

## 2021-07-22 RX ADMIN — MEPOLIZUMAB 100 MG: 100 INJECTION, POWDER, FOR SOLUTION SUBCUTANEOUS at 10:26

## 2021-08-19 ENCOUNTER — INFUSION (OUTPATIENT)
Dept: ONCOLOGY | Facility: HOSPITAL | Age: 76
End: 2021-08-19

## 2021-08-19 VITALS
HEART RATE: 63 BPM | SYSTOLIC BLOOD PRESSURE: 155 MMHG | RESPIRATION RATE: 16 BRPM | DIASTOLIC BLOOD PRESSURE: 71 MMHG | TEMPERATURE: 98.1 F

## 2021-08-19 DIAGNOSIS — M30.1 EOSINOPHILIC GRANULOMATOSIS WITH POLYANGIITIS (EGPA) (HCC): Primary | ICD-10-CM

## 2021-08-19 DIAGNOSIS — D72.18 EOSINOPHILIC GRANULOMATOSIS WITH POLYANGIITIS (EGPA) (HCC): Primary | ICD-10-CM

## 2021-08-19 PROCEDURE — 25010000002 MEPOLIZUMAB 100 MG RECONSTITUTED SOLUTION: Performed by: INTERNAL MEDICINE

## 2021-08-19 PROCEDURE — 96372 THER/PROPH/DIAG INJ SC/IM: CPT

## 2021-08-19 RX ADMIN — MEPOLIZUMAB 100 MG: 100 INJECTION, POWDER, FOR SOLUTION SUBCUTANEOUS at 08:30

## 2021-09-14 RX ORDER — LISINOPRIL 10 MG/1
TABLET ORAL
Qty: 90 TABLET | Refills: 3 | Status: SHIPPED | OUTPATIENT
Start: 2021-09-14 | End: 2022-09-21

## 2021-09-16 ENCOUNTER — INFUSION (OUTPATIENT)
Dept: ONCOLOGY | Facility: HOSPITAL | Age: 76
End: 2021-09-16

## 2021-09-16 VITALS
HEART RATE: 72 BPM | DIASTOLIC BLOOD PRESSURE: 77 MMHG | TEMPERATURE: 98.8 F | RESPIRATION RATE: 18 BRPM | SYSTOLIC BLOOD PRESSURE: 139 MMHG

## 2021-09-16 DIAGNOSIS — M30.1 EOSINOPHILIC GRANULOMATOSIS WITH POLYANGIITIS (EGPA) (HCC): Primary | ICD-10-CM

## 2021-09-16 DIAGNOSIS — D72.18 EOSINOPHILIC GRANULOMATOSIS WITH POLYANGIITIS (EGPA) (HCC): Primary | ICD-10-CM

## 2021-09-16 PROCEDURE — 96372 THER/PROPH/DIAG INJ SC/IM: CPT

## 2021-09-16 PROCEDURE — 25010000002 MEPOLIZUMAB 100 MG RECONSTITUTED SOLUTION: Performed by: INTERNAL MEDICINE

## 2021-09-16 RX ADMIN — MEPOLIZUMAB 100 MG: 100 INJECTION, POWDER, FOR SOLUTION SUBCUTANEOUS at 11:45

## 2021-10-14 ENCOUNTER — INFUSION (OUTPATIENT)
Dept: ONCOLOGY | Facility: HOSPITAL | Age: 76
End: 2021-10-14

## 2021-10-14 VITALS — DIASTOLIC BLOOD PRESSURE: 90 MMHG | HEART RATE: 75 BPM | SYSTOLIC BLOOD PRESSURE: 128 MMHG | TEMPERATURE: 97.6 F

## 2021-10-14 DIAGNOSIS — D72.18 EOSINOPHILIC GRANULOMATOSIS WITH POLYANGIITIS (EGPA) (HCC): Primary | ICD-10-CM

## 2021-10-14 DIAGNOSIS — M30.1 EOSINOPHILIC GRANULOMATOSIS WITH POLYANGIITIS (EGPA) (HCC): Primary | ICD-10-CM

## 2021-10-14 PROCEDURE — 25010000002 MEPOLIZUMAB 100 MG RECONSTITUTED SOLUTION: Performed by: INTERNAL MEDICINE

## 2021-10-14 PROCEDURE — 96372 THER/PROPH/DIAG INJ SC/IM: CPT

## 2021-10-14 RX ADMIN — MEPOLIZUMAB 100 MG: 100 INJECTION, POWDER, FOR SOLUTION SUBCUTANEOUS at 08:35

## 2021-10-27 ENCOUNTER — OFFICE VISIT (OUTPATIENT)
Dept: PULMONOLOGY | Facility: CLINIC | Age: 76
End: 2021-10-27

## 2021-10-27 VITALS
TEMPERATURE: 98.2 F | WEIGHT: 154 LBS | DIASTOLIC BLOOD PRESSURE: 68 MMHG | HEIGHT: 68 IN | SYSTOLIC BLOOD PRESSURE: 118 MMHG | BODY MASS INDEX: 23.34 KG/M2 | OXYGEN SATURATION: 96 % | HEART RATE: 72 BPM

## 2021-10-27 DIAGNOSIS — M30.1 EOSINOPHILIC GRANULOMATOSIS WITH POLYANGIITIS (EGPA) (HCC): Primary | ICD-10-CM

## 2021-10-27 DIAGNOSIS — J45.909 IDIOPATHIC ASTHMA: ICD-10-CM

## 2021-10-27 DIAGNOSIS — Z78.9 NON-SMOKER: ICD-10-CM

## 2021-10-27 DIAGNOSIS — K21.9 CHRONIC GERD: ICD-10-CM

## 2021-10-27 DIAGNOSIS — R91.8 BILATERAL PULMONARY INFILTRATES ON CHEST X-RAY: ICD-10-CM

## 2021-10-27 DIAGNOSIS — J30.89 SEASONAL AND PERENNIAL ALLERGIC RHINITIS: ICD-10-CM

## 2021-10-27 DIAGNOSIS — D72.18 EOSINOPHILIC GRANULOMATOSIS WITH POLYANGIITIS (EGPA) (HCC): Primary | ICD-10-CM

## 2021-10-27 DIAGNOSIS — J30.2 SEASONAL AND PERENNIAL ALLERGIC RHINITIS: ICD-10-CM

## 2021-10-27 PROCEDURE — 99214 OFFICE O/P EST MOD 30 MIN: CPT | Performed by: INTERNAL MEDICINE

## 2021-10-27 NOTE — PROGRESS NOTES
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    Since I last saw him he continues to do well  He is tolerating his medications  Insurance is covering the majority of the cost  Has had no acute exacerbation of asthma or sinus symptoms since I last saw him    Patient Active Problem List   Diagnosis   • CAD (coronary artery disease)   • PAD (peripheral artery disease) ([facility])   • Essential hypertension   • Mixed hyperlipidemia   • Peripheral vascular disease (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)   • Bilateral pulmonary infiltrates   • Screening PSA (prostate specific antigen)   • Medicare annual wellness visit, subsequent   • Chronic persistent asthma   • Abnormal glucose     No Known Allergies    Current Outpatient Medications:   •  albuterol sulfate HFA (VENTOLIN HFA) 108 (90 Base) MCG/ACT inhaler, Inhale 2 puffs Every 4 (Four) Hours As Needed for Wheezing., Disp: 18 inhaler, Rfl: 5  •  aspirin 325 MG tablet, Take 325 mg by mouth Daily., Disp: , Rfl:   •  atorvastatin (LIPITOR) 20 MG tablet, TAKE 1 TABLET BY MOUTH EVERYDAY AT BEDTIME, Disp: 90 tablet, Rfl: 3  •  budesonide-formoterol (SYMBICORT) 160-4.5 MCG/ACT inhaler, Inhale 2 puffs 2 (Two) Times a Day., Disp: 1  "inhaler, Rfl: 11  •  Bystolic 10 MG tablet, TAKE 1 TABLET BY MOUTH EVERY DAY, Disp: 90 tablet, Rfl: 3  •  chlorthalidone (HYGROTON) 25 MG tablet, TAKE 1/2 TABLET BY MOUTH EVERY DAY, Disp: 45 tablet, Rfl: 3  •  lisinopril (PRINIVIL,ZESTRIL) 10 MG tablet, TAKE 1 TABLET BY MOUTH EVERY DAY, Disp: 90 tablet, Rfl: 3  •  Mepolizumab (Nucala) 100 MG/ML solution prefilled syringe, Inject 1 mL under the skin into the appropriate area as directed Every 28 (Twenty-Eight) Days., Disp: 1 mL, Rfl: 11  •  omeprazole (priLOSEC) 20 MG capsule, Take 20 mg by mouth Daily., Disp: , Rfl:   •  Triamcinolone Acetonide (NASACORT) 55 MCG/ACT nasal inhaler, 2 sprays into the nostril(s) as directed by provider 2 (Two) Times a Day., Disp: , Rfl:   MEDICATION LIST AND ALLERGIES REVIEWED.    Family History   Problem Relation Age of Onset   • Esophageal cancer Father         cause of death     Social History     Tobacco Use   • Smoking status: Never Smoker   • Smokeless tobacco: Never Used   Vaping Use   • Vaping Use: Never used   Substance Use Topics   • Alcohol use: Yes   • Drug use: No     Social History     Social History Narrative        Retired     Lifelong non-smoker    Drinks about 1 alcoholic beverage on a weekly basis     FAMILY AND SOCIAL HISTORY REVIEWED.    Review of Systems  ALSO REFER TO SCANNED ROS SHEET FROM SAME DATE.    /68   Pulse 72   Temp 98.2 °F (36.8 °C)   Ht 172.7 cm (67.99\")   Wt 69.9 kg (154 lb)   SpO2 96%   BMI 23.42 kg/m²   Physical Exam  Vitals and nursing note reviewed.   Constitutional:       General: He is not in acute distress.     Appearance: He is well-developed. He is not diaphoretic.   HENT:      Head: Normocephalic and atraumatic.   Neck:      Thyroid: No thyromegaly.   Cardiovascular:      Rate and Rhythm: Normal rate and regular rhythm.      Heart sounds: Normal heart sounds. No murmur heard.      Pulmonary:      Effort: Pulmonary effort is normal.      Breath sounds: " Normal breath sounds. No stridor.   Chest:   Breasts:      Right: No supraclavicular adenopathy.      Left: No supraclavicular adenopathy.       Abdominal:      General: Bowel sounds are normal.      Palpations: Abdomen is soft.   Musculoskeletal:         General: Normal range of motion.   Lymphadenopathy:      Cervical: No cervical adenopathy.      Upper Body:      Right upper body: No supraclavicular or epitrochlear adenopathy.      Left upper body: No supraclavicular or epitrochlear adenopathy.   Skin:     General: Skin is warm and dry.   Neurological:      Mental Status: He is alert and oriented to person, place, and time.   Psychiatric:         Behavior: Behavior normal.         Results     Immunization History   Administered Date(s) Administered   • COVID-19 (PFIZER) 01/11/2021, 02/01/2021, 10/02/2021   • FLUAD TRI 65YR+ 10/21/2019, 09/30/2020   • Flu Vaccine Quad PF >18YRS 10/30/2015   • Fluzone High Dose =>65 Years ([employer] ONLY) 10/01/2014, 12/14/2016, 10/10/2017, 11/30/2017, 10/01/2018, 10/21/2019, 09/30/2020   • Hepatitis A 11/06/2018, 04/06/2019, 05/03/2019   • Pneumococcal Conjugate 13-Valent (PCV13) 12/17/2014   • Pneumococcal Polysaccharide (PPSV23) 06/25/2013   • Pneumococcal, Unspecified 06/25/2013   • Shingrix 03/18/2020, 05/01/2020, 06/10/2020   • Tdap 06/25/2013   • Zostavax 06/20/2012     Problem List       ICD-10-CM ICD-9-CM   1. 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 Perennial rhinitis  J30.89 477.9    J30.2    3. Non-smoker  Z78.9 V49.89   4. GERD  K21.9 530.81   5. Chronic persistent asthma  J45.909 493.90   6. Bilateral pulmonary infiltrates  R91.8 793.19       Discussion     Continues to do well symptomatically from the standpoint of his EGPA, chronic persistent asthma, and perennial rhinitis    He is going to continue on Nucala, Symbicort, and albuterol on an as-needed basis    For his sinus symptoms Allegra primarily and  Nasacort    I have encouraged reflux precautions    I will plan to see him back in 6 months with repeat PFTs    Moderate level of Medical Decision Making complexity based on 2 or more chronic stable illnesses and prescription drug management.    Jaspreet Quiroz MD  Note electronically signed    CC: Simon Easley MD

## 2021-11-09 ENCOUNTER — TELEPHONE (OUTPATIENT)
Dept: INTERNAL MEDICINE | Facility: CLINIC | Age: 76
End: 2021-11-09

## 2021-11-09 DIAGNOSIS — Z12.5 SCREENING PSA (PROSTATE SPECIFIC ANTIGEN): ICD-10-CM

## 2021-11-09 DIAGNOSIS — R73.09 ABNORMAL GLUCOSE: ICD-10-CM

## 2021-11-09 DIAGNOSIS — I10 ESSENTIAL HYPERTENSION: Primary | ICD-10-CM

## 2021-11-09 DIAGNOSIS — E78.2 MIXED HYPERLIPIDEMIA: ICD-10-CM

## 2021-11-09 DIAGNOSIS — Z11.59 ENCOUNTER FOR HEPATITIS C SCREENING TEST FOR LOW RISK PATIENT: ICD-10-CM

## 2021-11-09 NOTE — TELEPHONE ENCOUNTER
Caller: Uzair Jacob    Relationship: Self    Best call back number: [phone]    What orders are you requesting (i.e. lab or imaging): LABS    In what timeframe would the patient need to come in: Thursday 11/11/2021    Where will you receive your lab/imaging services: IN OFFICE     Additional notes: PLEASE CALL PATIENT ONCE HE HAS AN ACTIVE LAB ORDER [phone]

## 2021-11-11 ENCOUNTER — LAB (OUTPATIENT)
Dept: LAB | Facility: HOSPITAL | Age: 76
End: 2021-11-11

## 2021-11-11 ENCOUNTER — INFUSION (OUTPATIENT)
Dept: ONCOLOGY | Facility: HOSPITAL | Age: 76
End: 2021-11-11

## 2021-11-11 VITALS
SYSTOLIC BLOOD PRESSURE: 151 MMHG | DIASTOLIC BLOOD PRESSURE: 77 MMHG | RESPIRATION RATE: 16 BRPM | TEMPERATURE: 97.3 F | HEART RATE: 56 BPM

## 2021-11-11 DIAGNOSIS — Z12.5 SCREENING PSA (PROSTATE SPECIFIC ANTIGEN): ICD-10-CM

## 2021-11-11 DIAGNOSIS — E78.2 MIXED HYPERLIPIDEMIA: ICD-10-CM

## 2021-11-11 DIAGNOSIS — Z11.59 ENCOUNTER FOR HEPATITIS C SCREENING TEST FOR LOW RISK PATIENT: ICD-10-CM

## 2021-11-11 DIAGNOSIS — R73.09 ABNORMAL GLUCOSE: ICD-10-CM

## 2021-11-11 DIAGNOSIS — D72.18 EOSINOPHILIC GRANULOMATOSIS WITH POLYANGIITIS (EGPA) (HCC): Primary | ICD-10-CM

## 2021-11-11 DIAGNOSIS — I10 ESSENTIAL HYPERTENSION: ICD-10-CM

## 2021-11-11 DIAGNOSIS — M30.1 EOSINOPHILIC GRANULOMATOSIS WITH POLYANGIITIS (EGPA) (HCC): Primary | ICD-10-CM

## 2021-11-11 PROCEDURE — 25010000002 MEPOLIZUMAB 100 MG RECONSTITUTED SOLUTION: Performed by: INTERNAL MEDICINE

## 2021-11-11 PROCEDURE — 96372 THER/PROPH/DIAG INJ SC/IM: CPT

## 2021-11-11 RX ADMIN — MEPOLIZUMAB 100 MG: 100 INJECTION, POWDER, FOR SOLUTION SUBCUTANEOUS at 09:00

## 2021-11-12 LAB
ALBUMIN SERPL-MCNC: 3.7 G/DL (ref 3.5–5.2)
ALBUMIN/CREAT UR: <5 MG/G CREAT (ref 0–29)
ALBUMIN/GLOB SERPL: 1.4 G/DL
ALP SERPL-CCNC: 58 U/L (ref 39–117)
ALT SERPL-CCNC: 13 U/L (ref 1–41)
AST SERPL-CCNC: 14 U/L (ref 1–40)
BASOPHILS # BLD AUTO: 0.05 10*3/MM3 (ref 0–0.2)
BASOPHILS NFR BLD AUTO: 0.7 % (ref 0–1.5)
BILIRUB SERPL-MCNC: 1 MG/DL (ref 0–1.2)
BUN SERPL-MCNC: 20 MG/DL (ref 8–23)
BUN/CREAT SERPL: 18.2 (ref 7–25)
CALCIUM SERPL-MCNC: 9.2 MG/DL (ref 8.6–10.5)
CHLORIDE SERPL-SCNC: 101 MMOL/L (ref 98–107)
CHOLEST SERPL-MCNC: 158 MG/DL (ref 0–200)
CO2 SERPL-SCNC: 29.4 MMOL/L (ref 22–29)
CREAT SERPL-MCNC: 1.1 MG/DL (ref 0.76–1.27)
CREAT UR-MCNC: 54.9 MG/DL
EOSINOPHIL # BLD AUTO: 0.05 10*3/MM3 (ref 0–0.4)
EOSINOPHIL NFR BLD AUTO: 0.7 % (ref 0.3–6.2)
ERYTHROCYTE [DISTWIDTH] IN BLOOD BY AUTOMATED COUNT: 13.3 % (ref 12.3–15.4)
GLOBULIN SER CALC-MCNC: 2.7 GM/DL
GLUCOSE SERPL-MCNC: 63 MG/DL (ref 65–99)
HBA1C MFR BLD: 5.8 % (ref 4.8–5.6)
HCT VFR BLD AUTO: 45.8 % (ref 37.5–51)
HCV AB S/CO SERPL IA: 0.1 S/CO RATIO (ref 0–0.9)
HDLC SERPL-MCNC: 63 MG/DL (ref 40–60)
HGB BLD-MCNC: 15.1 G/DL (ref 13–17.7)
IMM GRANULOCYTES # BLD AUTO: 0.02 10*3/MM3 (ref 0–0.05)
IMM GRANULOCYTES NFR BLD AUTO: 0.3 % (ref 0–0.5)
LDLC SERPL CALC-MCNC: 83 MG/DL (ref 0–100)
LYMPHOCYTES # BLD AUTO: 1.48 10*3/MM3 (ref 0.7–3.1)
LYMPHOCYTES NFR BLD AUTO: 19.4 % (ref 19.6–45.3)
MCH RBC QN AUTO: 29.5 PG (ref 26.6–33)
MCHC RBC AUTO-ENTMCNC: 33 G/DL (ref 31.5–35.7)
MCV RBC AUTO: 89.5 FL (ref 79–97)
MICROALBUMIN UR-MCNC: <3 UG/ML
MONOCYTES # BLD AUTO: 0.84 10*3/MM3 (ref 0.1–0.9)
MONOCYTES NFR BLD AUTO: 11 % (ref 5–12)
NEUTROPHILS # BLD AUTO: 5.18 10*3/MM3 (ref 1.7–7)
NEUTROPHILS NFR BLD AUTO: 67.9 % (ref 42.7–76)
NRBC BLD AUTO-RTO: 0 /100 WBC (ref 0–0.2)
PLATELET # BLD AUTO: 212 10*3/MM3 (ref 140–450)
POTASSIUM SERPL-SCNC: 4.7 MMOL/L (ref 3.5–5.2)
PROT SERPL-MCNC: 6.4 G/DL (ref 6–8.5)
PSA SERPL-MCNC: 1.93 NG/ML (ref 0–4)
RBC # BLD AUTO: 5.12 10*6/MM3 (ref 4.14–5.8)
SODIUM SERPL-SCNC: 142 MMOL/L (ref 136–145)
TRIGL SERPL-MCNC: 62 MG/DL (ref 0–150)
VLDLC SERPL CALC-MCNC: 12 MG/DL (ref 5–40)
WBC # BLD AUTO: 7.62 10*3/MM3 (ref 3.4–10.8)

## 2021-11-16 ENCOUNTER — OFFICE VISIT (OUTPATIENT)
Dept: INTERNAL MEDICINE | Facility: CLINIC | Age: 76
End: 2021-11-16

## 2021-11-16 VITALS
HEART RATE: 68 BPM | HEIGHT: 68 IN | TEMPERATURE: 97.8 F | BODY MASS INDEX: 23.61 KG/M2 | DIASTOLIC BLOOD PRESSURE: 70 MMHG | WEIGHT: 155.8 LBS | SYSTOLIC BLOOD PRESSURE: 128 MMHG

## 2021-11-16 DIAGNOSIS — I10 ESSENTIAL HYPERTENSION: ICD-10-CM

## 2021-11-16 DIAGNOSIS — J45.909 IDIOPATHIC ASTHMA: ICD-10-CM

## 2021-11-16 DIAGNOSIS — Z00.00 MEDICARE ANNUAL WELLNESS VISIT, SUBSEQUENT: Primary | ICD-10-CM

## 2021-11-16 DIAGNOSIS — R73.09 ABNORMAL GLUCOSE: ICD-10-CM

## 2021-11-16 DIAGNOSIS — I25.10 CORONARY ARTERY DISEASE INVOLVING NATIVE CORONARY ARTERY OF NATIVE HEART WITHOUT ANGINA PECTORIS: ICD-10-CM

## 2021-11-16 DIAGNOSIS — E78.2 MIXED HYPERLIPIDEMIA: ICD-10-CM

## 2021-11-16 DIAGNOSIS — I73.9 PAD (PERIPHERAL ARTERY DISEASE) (HCC): ICD-10-CM

## 2021-11-16 PROCEDURE — G0009 ADMIN PNEUMOCOCCAL VACCINE: HCPCS | Performed by: INTERNAL MEDICINE

## 2021-11-16 PROCEDURE — 90732 PPSV23 VACC 2 YRS+ SUBQ/IM: CPT | Performed by: INTERNAL MEDICINE

## 2021-11-16 PROCEDURE — 99397 PER PM REEVAL EST PAT 65+ YR: CPT | Performed by: INTERNAL MEDICINE

## 2021-11-16 PROCEDURE — 1159F MED LIST DOCD IN RCRD: CPT | Performed by: INTERNAL MEDICINE

## 2021-11-16 PROCEDURE — 1126F AMNT PAIN NOTED NONE PRSNT: CPT | Performed by: INTERNAL MEDICINE

## 2021-11-16 PROCEDURE — 96160 PT-FOCUSED HLTH RISK ASSMT: CPT | Performed by: INTERNAL MEDICINE

## 2021-11-16 PROCEDURE — G0439 PPPS, SUBSEQ VISIT: HCPCS | Performed by: INTERNAL MEDICINE

## 2021-11-16 PROCEDURE — 1170F FXNL STATUS ASSESSED: CPT | Performed by: INTERNAL MEDICINE

## 2021-11-16 RX ORDER — NEBIVOLOL HYDROCHLORIDE 10 MG/1
TABLET ORAL
Qty: 90 TABLET | Refills: 3 | Status: SHIPPED | OUTPATIENT
Start: 2021-11-16 | End: 2022-11-14

## 2021-11-16 NOTE — PROGRESS NOTES
QUICK REFERENCE INFORMATION:  The ABCs of the Annual Wellness Visit    Subsequent Medicare Wellness Visit    HEALTH RISK ASSESSMENT    1945    Recent Hospitalizations:  No hospitalization(s) within the last year..        Current Medical Providers:  Patient Care Team:  Simon Easley MD as PCP - General  Vic Blas MD as Consulting Physician (Cardiology)  Sukhdev Sun MD (Dermatology)  Jaspreet Quiroz MD as Emergency Attending (Pulmonary Disease)  Joey Elizalde MD as Consulting Physician (Ophthalmology)        Smoking Status:  Social History     Tobacco Use   Smoking Status Never Smoker   Smokeless Tobacco Never Used       Alcohol Consumption:  Social History     Substance and Sexual Activity   Alcohol Use Yes       Depression Screen:   PHQ-2/PHQ-9 Depression Screening 11/16/2021   Little interest or pleasure in doing things 0   Feeling down, depressed, or hopeless 0   Total Score 0       Health Habits and Functional and Cognitive Screening:  Functional & Cognitive Status 11/16/2021   Do you have difficulty preparing food and eating? No   Do you have difficulty bathing yourself, getting dressed or grooming yourself? No   Do you have difficulty using the toilet? No   Do you have difficulty moving around from place to place? No   Do you have trouble with steps or getting out of a bed or a chair? No   Current Diet Well Balanced Diet   Dental Exam Up to date   Eye Exam Up to date   Exercise (times per week) 3 times per week   Current Exercises Include Treadmill;Stationary Bicycling/Spin Class        Exercise Comment [employer]   Current Exercise Activities Include -   Do you need help using the phone?  No   Are you deaf or do you have serious difficulty hearing?  No   Do you need help with transportation? No   Do you need help shopping? No   Do you need help preparing meals?  No   Do you need help with housework?  No   Do you need help with laundry? No   Do you need help  taking your medications? No   Do you need help managing money? No   Do you ever drive or ride in a car without wearing a seat belt? No   Have you felt unusual stress, anger or loneliness in the last month? No   Who do you live with? Spouse   If you need help, do you have trouble finding someone available to you? No   Have you been bothered in the last four weeks by sexual problems? No   Do you have difficulty concentrating, remembering or making decisions? No       Fall Risk Screen:  STEADI Fall Risk Assessment was completed, and patient is at LOW risk for falls.Assessment completed on:11/16/2021    ACE III MINI        Does the patient have evidence of cognitive impairment? No    Aspirin use counseling: Taking ASA appropriately as indicated    Recent Lab Results:  CMP:  Lab Results   Component Value Date    BUN 20 11/11/2021    CREATININE 1.10 11/11/2021    EGFRIFNONA 65 11/11/2021    EGFRIFAFRI 79 11/11/2021    BCR 18.2 11/11/2021     11/11/2021    K 4.7 11/11/2021    CO2 29.4 (H) 11/11/2021    CALCIUM 9.2 11/11/2021    PROTENTOTREF 6.4 11/11/2021    ALBUMIN 3.70 11/11/2021    LABGLOBREF 2.7 11/11/2021    LABIL2 1.4 11/11/2021    BILITOT 1.0 11/11/2021    ALKPHOS 58 11/11/2021    AST 14 11/11/2021    ALT 13 11/11/2021     HbA1c:  Lab Results   Component Value Date    HGBA1C 5.80 (H) 11/11/2021    HGBA1C 6.0 05/12/2021     Microalbumin:  Lab Results   Component Value Date    MICROALBUR <3.0 11/11/2021     Lipid Panel  Lab Results   Component Value Date    TRIG 62 11/11/2021    HDL 63 (H) 11/11/2021    LDL 83 11/11/2021    AST 14 11/11/2021    ALT 13 11/11/2021       Visual Acuity:  No exam data present    Age-appropriate Screening Schedule:  Refer to the list below for future screening recommendations based on patient's age, sex and/or medical conditions. Orders for these recommended tests are listed in the plan section. The patient has been provided with a written plan.    Health Maintenance   Topic Date Due    • LIPID PANEL  11/11/2022   • TDAP/TD VACCINES (2 - Td or Tdap) 06/25/2023   • INFLUENZA VACCINE  Completed   • ZOSTER VACCINE  Completed        Subjective   History of Present Illness    Uzair Jacob is a 76 y.o. male who presents for a Subsequent Wellness Visit. His chronic medical problems include hypertension, coronary artery disease, hyperlipidemia, peripheral artery disease, normal glucose and chronic persistent asthma. He is feeling well overall and has no concerns today.     Chronic persistent asthma  His  seems to be doing well breathing wise. He continues to get the Nucala injections. After getting the injections, he feels great for about 28 days then he becomes symptomatic for several days leading up to his next injections. He has a chronic cough. He also follows with Dr. Quiroz.     Coronary artery disease  He was going to cardiac rehab but due to COVID it is on hold. His coronary artery disease is stable.     Medications   He remains compliant with his current medication regimen. He does not need refills currently.     Labs  We reviewed and discussed his labs. His glucose levels are trending down and his A1c is within normal range. His electrolytes including the sodium and potassium are completely normal. His kidney functions and liver function tests are within normal limits. His cholesterol panel is excellent. The total cholesterol is 179, his good cholesterol is trending up, the  bad cholesterol remains the same at 83 and the triglycerides are within normal limits. His blood counts, white blood counts, hemoglobin and platelet counts are within normal limits. His PSA and his urine levels are within normal limits as well.     Skin  He has some precancerous cells for which he follows with a dermatologist, Dr. Sun in [place]. He states Dr. Sun is planning on doing some laser treatments to kill all the precancerous cells and if it is not successful then he would continue doing the  treatments.     Health maintenance  His stamina and strength remain stable. He was attending cardiac rehab for strength but due to COVID it was stopped. He has continued to go to the gym gradually increasing his stamina. He works on cardio on the treadmill for about 27 minutes at 3.2 miles an hour. He also does the elliptical for 27 minutes and also the rowing machine as well.   He is up to date with most of his vaccinations. He is due for the Pneumovax.       CHRONIC CONDITIONS  Hypertension, coronary artery disease, hyperlipidemia, peripheral artery disease, normal glucose and chronic persistent asthma.  The following portions of the patient's history were reviewed and updated as appropriate: allergies, current medications, past family history, past medical history, past social history, past surgical history and problem list.    Outpatient Medications Prior to Visit   Medication Sig Dispense Refill   • albuterol sulfate HFA (VENTOLIN HFA) 108 (90 Base) MCG/ACT inhaler Inhale 2 puffs Every 4 (Four) Hours As Needed for Wheezing. 18 inhaler 5   • aspirin 325 MG tablet Take 325 mg by mouth Daily.     • atorvastatin (LIPITOR) 20 MG tablet TAKE 1 TABLET BY MOUTH EVERYDAY AT BEDTIME 90 tablet 3   • budesonide-formoterol (SYMBICORT) 160-4.5 MCG/ACT inhaler Inhale 2 puffs 2 (Two) Times a Day. 1 inhaler 11   • Bystolic 10 MG tablet TAKE 1 TABLET BY MOUTH EVERY DAY 90 tablet 3   • chlorthalidone (HYGROTON) 25 MG tablet TAKE 1/2 TABLET BY MOUTH EVERY DAY 45 tablet 3   • lisinopril (PRINIVIL,ZESTRIL) 10 MG tablet TAKE 1 TABLET BY MOUTH EVERY DAY 90 tablet 3   • Mepolizumab (Nucala) 100 MG/ML solution prefilled syringe Inject 1 mL under the skin into the appropriate area as directed Every 28 (Twenty-Eight) Days. 1 mL 11   • omeprazole (priLOSEC) 20 MG capsule Take 20 mg by mouth Daily.     • Triamcinolone Acetonide (NASACORT) 55 MCG/ACT nasal inhaler 2 sprays into the nostril(s) as directed by provider 2 (Two) Times a Day.        No facility-administered medications prior to visit.       Patient Active Problem List   Diagnosis   • CAD (coronary artery disease)   • PAD (peripheral artery disease) ([facility])   • Essential hypertension   • Mixed hyperlipidemia   • Peripheral vascular disease ([facility])   • Edema of right lower extremity   • Annual physical exam   • Alcohol use   • Preoperative examination   • Sinus bradycardia, persistent   • Postphlebitic syndrome   • GERD   • Perennial rhinitis   • Non-smoker   • 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) (CMS/[facility])   • Bilateral pulmonary infiltrates   • Screening PSA (prostate specific antigen)   • Medicare annual wellness visit, subsequent   • Chronic persistent asthma   • Abnormal glucose       Advance Care Planning:  ACP discussion was held with the patient during this visit. Patient has an advance directive (not in EMR), copy requested.    Identification of Risk Factors:  Risk factors include: Advance Directive Discussion.    Review of Systems   HENT: Negative.    Respiratory: Positive for cough (Ocassional).         Chest congestion   Cardiovascular: Negative.        Compared to one year ago, the patient feels his physical health is better.  Compared to one year ago, the patient feels his mental health is the same.    Objective     Physical Exam  Vitals and nursing note reviewed.   Constitutional:       Appearance: Normal appearance. He is well-developed.   HENT:      Head: Normocephalic and atraumatic.      Comments: Mask in place.     Right Ear: External ear normal.      Left Ear: External ear normal.      Mouth/Throat:      Mouth: Mucous membranes are moist.      Pharynx: No oropharyngeal exudate.   Eyes:      Conjunctiva/sclera: Conjunctivae normal.      Pupils: Pupils are equal, round, and reactive to light.   Cardiovascular:      Rate and Rhythm: Normal rate and regular rhythm.      Pulses: Normal pulses.      Heart sounds: No murmur heard.  No friction rub. No gallop.   "  Pulmonary:      Effort: Pulmonary effort is normal.      Breath sounds: Wheezing (mild diffuse wheezing throughout without rales or rhonchi) present. No rhonchi or rales.   Abdominal:      General: Bowel sounds are normal.      Palpations: Abdomen is soft.      Tenderness: There is no abdominal tenderness.   Skin:     General: Skin is warm and dry.      Findings: No rash.   Neurological:      Mental Status: He is alert and oriented to person, place, and time.      Cranial Nerves: No cranial nerve deficit.   Psychiatric:         Mood and Affect: Mood and affect normal.         Behavior: Behavior normal.         Thought Content: Thought content normal.         Judgment: Judgment normal.          Procedures     Vitals:    11/16/21 0838   BP: 128/70   BP Location: Left arm   Patient Position: Sitting   Cuff Size: Adult   Pulse: 68   Temp: 97.8 °F (36.6 °C)   Weight: 70.7 kg (155 lb 12.8 oz)   Height: 171.5 cm (67.5\")   PainSc: 0-No pain       Patient's Body mass index is 24.04 kg/m². indicating that he is within normal range (BMI 18.5-24.9). No BMI management plan needed..      Assessment/Plan   Problem List Items Addressed This Visit        Cardiac and Vasculature    CAD (coronary artery disease)    Overview  - Coronary artery disease is asymptomatic on aspirin, atorvastatin and lisinopril.      a. Catheterization, March 2012:  70% to 80% mid-LAD with ‘borderline” FFR.  3.5 x 15 Xience.  EF normal.             Relevant Medications    Bystolic 10 MG tablet    PAD (peripheral artery disease) (HCC)    Overview  - PAD is asymptomatic on aspirin and atorvastatin.      b. Asymptomatic abnormal MADISON.  c. Right 0.7, left NC, April 2012.           Essential hypertension    Overview  - Blood pressure is well controlled on lisinopril and chlorthalidone.      Continue lisinopril 10 mg tablets, chlorthalidone 25 mg tablets daily.         Relevant Medications    chlorthalidone (HYGROTON) 25 MG tablet    lisinopril " (PRINIVIL,ZESTRIL) 10 MG tablet    Bystolic 10 MG tablet    Mixed hyperlipidemia    Overview  - Lipids are extremely well controlled on atorvastatin and healthy diet.      Continue atorvastatin 20 mg tablets daily.         Relevant Medications    atorvastatin (LIPITOR) 20 MG tablet       Endocrine and Metabolic    Abnormal glucose  - A1c is near normal at 5.8. He has done an excellent job at improving his diet and avoiding weight gain.        Health Encounters    Medicare annual wellness visit, subsequent - Primary  - Overall he is doing very well. He is up to date on colorectal cancer screening. He is fully vaccinated against COVID-19 and influenza. He will receive Pneumovax booster today.          Pulmonary and Pneumonias    Chronic persistent asthma  - This is followed by Dr. Quiroz in pulmonary. He continues to have an excellent response to Nucala. With almost resolution of chest symptoms until just before he is due for his monthly dose.     Relevant Medications    albuterol sulfate HFA (VENTOLIN HFA) 108 (90 Base) MCG/ACT inhaler    Mepolizumab (Nucala) 100 MG/ML solution prefilled syringe    budesonide-formoterol (SYMBICORT) 160-4.5 MCG/ACT inhaler        Patient Self-Management and Personalized Health Advice  The patient has been provided with information about: diet, exercise and weight management and preventive services including:   · Annual Wellness Visit (AWV)  · Pneumococcal Vaccine and Administration.    Outpatient Encounter Medications as of 11/16/2021   Medication Sig Dispense Refill   • albuterol sulfate HFA (VENTOLIN HFA) 108 (90 Base) MCG/ACT inhaler Inhale 2 puffs Every 4 (Four) Hours As Needed for Wheezing. 18 inhaler 5   • aspirin 325 MG tablet Take 325 mg by mouth Daily.     • atorvastatin (LIPITOR) 20 MG tablet TAKE 1 TABLET BY MOUTH EVERYDAY AT BEDTIME 90 tablet 3   • budesonide-formoterol (SYMBICORT) 160-4.5 MCG/ACT inhaler Inhale 2 puffs 2 (Two) Times a Day. 1 inhaler 11   • Bystolic 10  MG tablet TAKE 1 TABLET BY MOUTH EVERY DAY 90 tablet 3   • chlorthalidone (HYGROTON) 25 MG tablet TAKE 1/2 TABLET BY MOUTH EVERY DAY 45 tablet 3   • lisinopril (PRINIVIL,ZESTRIL) 10 MG tablet TAKE 1 TABLET BY MOUTH EVERY DAY 90 tablet 3   • Mepolizumab (Nucala) 100 MG/ML solution prefilled syringe Inject 1 mL under the skin into the appropriate area as directed Every 28 (Twenty-Eight) Days. 1 mL 11   • omeprazole (priLOSEC) 20 MG capsule Take 20 mg by mouth Daily.     • Triamcinolone Acetonide (NASACORT) 55 MCG/ACT nasal inhaler 2 sprays into the nostril(s) as directed by provider 2 (Two) Times a Day.     • [DISCONTINUED] Bystolic 10 MG tablet TAKE 1 TABLET BY MOUTH EVERY DAY 90 tablet 3     No facility-administered encounter medications on file as of 11/16/2021.       Reviewed use of high risk medication in the elderly: yes  Reviewed for potential of harmful drug interactions in the elderly: yes    Follow Up:  Return in about 6 months (around 5/16/2022) for follow up.     There are no Patient Instructions on file for this visit.    An After Visit Summary and PPPS with all of these plans were given to the patient.        Transcribed from ambient dictation for Simon Easley MD by Judy Bowser.  11/16/2021  12:13 PM

## 2021-12-06 DIAGNOSIS — J45.909 IDIOPATHIC ASTHMA: ICD-10-CM

## 2021-12-06 RX ORDER — BUDESONIDE AND FORMOTEROL FUMARATE DIHYDRATE 160; 4.5 UG/1; UG/1
AEROSOL RESPIRATORY (INHALATION)
Qty: 30.6 EACH | Refills: 3 | Status: SHIPPED | OUTPATIENT
Start: 2021-12-06 | End: 2022-10-27

## 2021-12-08 DIAGNOSIS — J45.909 IDIOPATHIC ASTHMA: Primary | ICD-10-CM

## 2021-12-09 ENCOUNTER — INFUSION (OUTPATIENT)
Dept: ONCOLOGY | Facility: HOSPITAL | Age: 76
End: 2021-12-09

## 2021-12-09 VITALS
TEMPERATURE: 97.1 F | RESPIRATION RATE: 20 BRPM | SYSTOLIC BLOOD PRESSURE: 141 MMHG | HEART RATE: 53 BPM | DIASTOLIC BLOOD PRESSURE: 71 MMHG

## 2021-12-09 DIAGNOSIS — M30.1 EOSINOPHILIC GRANULOMATOSIS WITH POLYANGIITIS (EGPA) (HCC): Primary | ICD-10-CM

## 2021-12-09 DIAGNOSIS — D72.18 EOSINOPHILIC GRANULOMATOSIS WITH POLYANGIITIS (EGPA) (HCC): Primary | ICD-10-CM

## 2021-12-09 PROCEDURE — 96372 THER/PROPH/DIAG INJ SC/IM: CPT

## 2021-12-09 PROCEDURE — 25010000002 MEPOLIZUMAB 100 MG RECONSTITUTED SOLUTION: Performed by: NURSE PRACTITIONER

## 2021-12-09 RX ADMIN — MEPOLIZUMAB 100 MG: 100 INJECTION, POWDER, FOR SOLUTION SUBCUTANEOUS at 08:31

## 2022-01-06 ENCOUNTER — INFUSION (OUTPATIENT)
Dept: ONCOLOGY | Facility: HOSPITAL | Age: 77
End: 2022-01-06

## 2022-01-06 VITALS
RESPIRATION RATE: 16 BRPM | DIASTOLIC BLOOD PRESSURE: 85 MMHG | TEMPERATURE: 97.9 F | SYSTOLIC BLOOD PRESSURE: 126 MMHG | HEART RATE: 96 BPM

## 2022-01-06 DIAGNOSIS — M30.1 EOSINOPHILIC GRANULOMATOSIS WITH POLYANGIITIS (EGPA): Primary | ICD-10-CM

## 2022-01-06 DIAGNOSIS — D72.18 EOSINOPHILIC GRANULOMATOSIS WITH POLYANGIITIS (EGPA): Primary | ICD-10-CM

## 2022-01-06 PROCEDURE — 25010000002 MEPOLIZUMAB 100 MG RECONSTITUTED SOLUTION: Performed by: NURSE PRACTITIONER

## 2022-01-06 PROCEDURE — 96372 THER/PROPH/DIAG INJ SC/IM: CPT

## 2022-01-06 RX ADMIN — MEPOLIZUMAB 100 MG: 100 INJECTION, POWDER, FOR SOLUTION SUBCUTANEOUS at 08:32

## 2022-01-12 ENCOUNTER — OFFICE VISIT (OUTPATIENT)
Dept: CARDIOLOGY | Facility: CLINIC | Age: 77
End: 2022-01-12

## 2022-01-12 VITALS
DIASTOLIC BLOOD PRESSURE: 70 MMHG | OXYGEN SATURATION: 96 % | HEART RATE: 64 BPM | BODY MASS INDEX: 24.8 KG/M2 | WEIGHT: 158 LBS | HEIGHT: 67 IN | SYSTOLIC BLOOD PRESSURE: 130 MMHG

## 2022-01-12 DIAGNOSIS — I25.10 CORONARY ARTERY DISEASE INVOLVING NATIVE CORONARY ARTERY OF NATIVE HEART WITHOUT ANGINA PECTORIS: Primary | ICD-10-CM

## 2022-01-12 DIAGNOSIS — E78.2 MIXED HYPERLIPIDEMIA: ICD-10-CM

## 2022-01-12 DIAGNOSIS — I73.9 PAD (PERIPHERAL ARTERY DISEASE): ICD-10-CM

## 2022-01-12 DIAGNOSIS — I10 ESSENTIAL HYPERTENSION: ICD-10-CM

## 2022-01-12 PROCEDURE — 99214 OFFICE O/P EST MOD 30 MIN: CPT | Performed by: NURSE PRACTITIONER

## 2022-01-12 PROCEDURE — 93000 ELECTROCARDIOGRAM COMPLETE: CPT | Performed by: NURSE PRACTITIONER

## 2022-01-12 NOTE — PROGRESS NOTES
Subjective:     Encounter Date:01/12/2022    Primary Care Physician: Simon Easley MD      Patient ID: Uzair Jacob is a 76 y.o. male.    Chief Complaint:Follow-up      PROBLEM LIST:  1. Coronary artery disease:  1. Catheterization, March 2012: 70% to 80% mid-LAD with ‘borderline” FFR. 3.5 x 15 Xience. EF normal.   2. 11/6/2019.  Normal stress perfusion and echo  2. Peripheral arterial disease:  1. Asymptomatic abnormal M[place].  2. Right 0.7, left NC, April 2012.  3. Hypertension.  4. Hyperlipidemia.  5. DVT, right lower extremity.  1/18.  6. Asthma.  7. Gastroesophageal reflux disease.  8. Tonsillectomy.  9. Sinus polyp extraction.      No Known Allergies      Current Outpatient Medications:   •  albuterol sulfate HFA (VENTOLIN HFA) 108 (90 Base) MCG/ACT inhaler, Inhale 2 puffs Every 4 (Four) Hours As Needed for Wheezing., Disp: 18 inhaler, Rfl: 5  •  aspirin 325 MG tablet, Take 325 mg by mouth Daily., Disp: , Rfl:   •  atorvastatin (LIPITOR) 20 MG tablet, TAKE 1 TABLET BY MOUTH EVERYDAY AT BEDTIME (Patient taking differently: Take 20 mg by mouth Daily.), Disp: 90 tablet, Rfl: 3  •  Bystolic 10 MG tablet, TAKE 1 TABLET BY MOUTH EVERY DAY, Disp: 90 tablet, Rfl: 3  •  chlorthalidone (HYGROTON) 25 MG tablet, TAKE 1/2 TABLET BY MOUTH EVERY DAY, Disp: 45 tablet, Rfl: 3  •  lisinopril (PRINIVIL,ZESTRIL) 10 MG tablet, TAKE 1 TABLET BY MOUTH EVERY DAY, Disp: 90 tablet, Rfl: 3  •  Mepolizumab (Nucala) 100 MG/ML solution prefilled syringe, Inject 1 mL under the skin into the appropriate area as directed Every 28 (Twenty-Eight) Days., Disp: 1 mL, Rfl: 11  •  Mepolizumab 100 MG/ML solution prefilled syringe, Inject 1 mL under the skin into the appropriate area as directed Every 28 (Twenty-Eight) Days., Disp: 1 each, Rfl: 11  •  omeprazole (priLOSEC) 20 MG capsule, Take 20 mg by mouth Daily., Disp: , Rfl:   •  Symbicort 160-4.5 MCG/ACT inhaler, TAKE 2 PUFFS BY MOUTH TWICE A DAY, Disp: 30.6 each, Rfl: 3  •   "Triamcinolone Acetonide (NASACORT) 55 MCG/ACT nasal inhaler, 2 sprays into the nostril(s) as directed by provider 2 (Two) Times a Day., Disp: , Rfl:         History of Present Illness    Patient is a 76-year-old  male who is being seen today for annual follow-up of coronary artery disease and peripheral arterial disease.  Since last being seen he notes to overall be doing very well.  He is working out at a gym 3 times a week.  Denies any anginal complaints.  States he is compliant with his medications.  No reported syncope, near-syncope, edema, or claudication symptoms.    The following portions of the patient's history were reviewed and updated as appropriate: allergies, current medications, past family history, past medical history, past social history, past surgical history and problem list.      Social History     Tobacco Use   • Smoking status: Never Smoker   • Smokeless tobacco: Never Used   Vaping Use   • Vaping Use: Never used   Substance Use Topics   • Alcohol use: Yes   • Drug use: No         Review of Systems   Constitutional: Negative.   Cardiovascular: Negative for chest pain, dyspnea on exertion, leg swelling, palpitations and syncope.   Respiratory: Negative.  Negative for shortness of breath.    Hematologic/Lymphatic: Negative for bleeding problem. Does not bruise/bleed easily.   Skin: Negative for rash.   Musculoskeletal: Positive for arthritis. Negative for muscle weakness and myalgias.   Gastrointestinal: Negative for heartburn, nausea and vomiting.   Neurological: Negative for dizziness, light-headedness, loss of balance and numbness.          Objective:   /70   Pulse 64   Ht 170.2 cm (67\")   Wt 71.7 kg (158 lb)   SpO2 96%   BMI 24.75 kg/m²         Vitals reviewed.   Constitutional:       Appearance: Well-developed and not in distress.   Neck:      Vascular: No JVD.      Trachea: No tracheal deviation.   Pulmonary:      Effort: Pulmonary effort is normal.      Breath sounds: " Normal breath sounds.   Cardiovascular:      Normal rate. Regularly irregular rhythm.   Edema:     Peripheral edema absent.   Abdominal:      General: Bowel sounds are normal.      Palpations: Abdomen is soft.      Tenderness: There is no abdominal tenderness.   Musculoskeletal:         General: No deformity. Skin:     General: Skin is warm and dry.   Neurological:      Mental Status: Alert and oriented to person, place, and time.           ECG 12 Lead    Date/Time: 1/12/2022 2:10 PM  Performed by: Kalpana Shirley APRN  Authorized by: Kalpana Shirley APRN   Comparison: compared with previous ECG from 11/6/2019  Comparison to previous ECG: PACs now noted  Rhythm: sinus rhythm  Ectopy: atrial premature contractions    Clinical impression: abnormal EKG  Comments: Old IMI                  Assessment:   Assessment/Plan      Diagnoses and all orders for this visit:    1. Coronary artery disease involving native coronary artery of native heart without angina pectoris (Primary).  Stable.  No current angina.  On aspirin.    2. PAD (peripheral artery disease) (HCC), stable.  No active symptoms.  On aspirin.    3. Essential hypertension, controlled.  On beta-blocker and lisinopril.    4. Mixed hyperlipidemia, stable.  On statin therapy.    Other orders  -     ECG 12 Lead      Plan:  1. Continue current cardiac medications  2. Continue current exercise regimen  3. Follow-up in 1 years time or sooner if needed.       Kalpana DIGGS     Dictated utilizing Dragon dictation

## 2022-02-03 ENCOUNTER — INFUSION (OUTPATIENT)
Dept: ONCOLOGY | Facility: HOSPITAL | Age: 77
End: 2022-02-03

## 2022-02-03 VITALS
TEMPERATURE: 97.1 F | RESPIRATION RATE: 18 BRPM | HEART RATE: 51 BPM | SYSTOLIC BLOOD PRESSURE: 153 MMHG | DIASTOLIC BLOOD PRESSURE: 70 MMHG

## 2022-02-03 DIAGNOSIS — D72.18 EOSINOPHILIC GRANULOMATOSIS WITH POLYANGIITIS (EGPA): Primary | ICD-10-CM

## 2022-02-03 DIAGNOSIS — M30.1 EOSINOPHILIC GRANULOMATOSIS WITH POLYANGIITIS (EGPA): Primary | ICD-10-CM

## 2022-02-03 PROCEDURE — 96372 THER/PROPH/DIAG INJ SC/IM: CPT

## 2022-02-03 PROCEDURE — 25010000002 MEPOLIZUMAB 100 MG RECONSTITUTED SOLUTION: Performed by: NURSE PRACTITIONER

## 2022-02-03 RX ADMIN — MEPOLIZUMAB 100 MG: 100 INJECTION, POWDER, FOR SOLUTION SUBCUTANEOUS at 09:00

## 2022-02-28 DIAGNOSIS — J45.909 IDIOPATHIC ASTHMA: Primary | ICD-10-CM

## 2022-02-28 RX ORDER — CHLORTHALIDONE 25 MG/1
TABLET ORAL
Qty: 45 TABLET | Refills: 3 | Status: SHIPPED | OUTPATIENT
Start: 2022-02-28 | End: 2023-02-13

## 2022-03-03 ENCOUNTER — INFUSION (OUTPATIENT)
Dept: ONCOLOGY | Facility: HOSPITAL | Age: 77
End: 2022-03-03

## 2022-03-03 VITALS
TEMPERATURE: 97.1 F | DIASTOLIC BLOOD PRESSURE: 73 MMHG | SYSTOLIC BLOOD PRESSURE: 137 MMHG | RESPIRATION RATE: 16 BRPM | HEART RATE: 72 BPM

## 2022-03-03 DIAGNOSIS — D72.18 EOSINOPHILIC GRANULOMATOSIS WITH POLYANGIITIS (EGPA): Primary | ICD-10-CM

## 2022-03-03 DIAGNOSIS — M30.1 EOSINOPHILIC GRANULOMATOSIS WITH POLYANGIITIS (EGPA): Primary | ICD-10-CM

## 2022-03-03 PROCEDURE — 25010000002 MEPOLIZUMAB 100 MG RECONSTITUTED SOLUTION: Performed by: NURSE PRACTITIONER

## 2022-03-03 PROCEDURE — 96372 THER/PROPH/DIAG INJ SC/IM: CPT

## 2022-03-03 RX ADMIN — MEPOLIZUMAB 100 MG: 100 INJECTION, POWDER, FOR SOLUTION SUBCUTANEOUS at 08:45

## 2022-03-28 RX ORDER — ATORVASTATIN CALCIUM 20 MG/1
20 TABLET, FILM COATED ORAL
Qty: 90 TABLET | Refills: 3 | Status: SHIPPED | OUTPATIENT
Start: 2022-03-28

## 2022-03-31 ENCOUNTER — INFUSION (OUTPATIENT)
Dept: ONCOLOGY | Facility: HOSPITAL | Age: 77
End: 2022-03-31

## 2022-03-31 ENCOUNTER — HOSPITAL ENCOUNTER (OUTPATIENT)
Dept: GENERAL RADIOLOGY | Facility: HOSPITAL | Age: 77
Discharge: HOME | End: 2022-03-31

## 2022-03-31 VITALS
HEART RATE: 64 BPM | DIASTOLIC BLOOD PRESSURE: 56 MMHG | TEMPERATURE: 97.4 F | RESPIRATION RATE: 16 BRPM | SYSTOLIC BLOOD PRESSURE: 112 MMHG

## 2022-03-31 DIAGNOSIS — J45.909 IDIOPATHIC ASTHMA: ICD-10-CM

## 2022-03-31 DIAGNOSIS — D72.18 EOSINOPHILIC GRANULOMATOSIS WITH POLYANGIITIS (EGPA): Primary | ICD-10-CM

## 2022-03-31 DIAGNOSIS — M30.1 EOSINOPHILIC GRANULOMATOSIS WITH POLYANGIITIS (EGPA): Primary | ICD-10-CM

## 2022-03-31 PROCEDURE — 25010000002 MEPOLIZUMAB 100 MG RECONSTITUTED SOLUTION: Performed by: NURSE PRACTITIONER

## 2022-03-31 PROCEDURE — 71046 X-RAY EXAM CHEST 2 VIEWS: CPT

## 2022-03-31 PROCEDURE — 96372 THER/PROPH/DIAG INJ SC/IM: CPT

## 2022-03-31 RX ADMIN — MEPOLIZUMAB 100 MG: 100 INJECTION, POWDER, FOR SOLUTION SUBCUTANEOUS at 08:09

## 2022-04-07 ENCOUNTER — OFFICE VISIT (OUTPATIENT)
Dept: PULMONOLOGY | Facility: CLINIC | Age: 77
End: 2022-04-07

## 2022-04-07 VITALS
TEMPERATURE: 97.7 F | HEART RATE: 56 BPM | SYSTOLIC BLOOD PRESSURE: 122 MMHG | RESPIRATION RATE: 16 BRPM | BODY MASS INDEX: 24.17 KG/M2 | WEIGHT: 154 LBS | HEIGHT: 67 IN | OXYGEN SATURATION: 95 % | DIASTOLIC BLOOD PRESSURE: 76 MMHG

## 2022-04-07 DIAGNOSIS — M30.1 EOSINOPHILIC GRANULOMATOSIS WITH POLYANGIITIS (EGPA): Primary | ICD-10-CM

## 2022-04-07 DIAGNOSIS — Z78.9 NON-SMOKER: ICD-10-CM

## 2022-04-07 DIAGNOSIS — R91.8 BILATERAL PULMONARY INFILTRATES ON CHEST X-RAY: ICD-10-CM

## 2022-04-07 DIAGNOSIS — D72.18 EOSINOPHILIC GRANULOMATOSIS WITH POLYANGIITIS (EGPA): Primary | ICD-10-CM

## 2022-04-07 DIAGNOSIS — K21.9 CHRONIC GERD: ICD-10-CM

## 2022-04-07 PROCEDURE — 99214 OFFICE O/P EST MOD 30 MIN: CPT | Performed by: INTERNAL MEDICINE

## 2022-04-07 NOTE — PROGRESS NOTES
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    Since I saw him he continues to do well without any acute exacerbation of bronchitis or asthma    Insurance continues to cover the cost of his Nucala  No problems getting it through the infusion center    He has perennial rhinitis symptoms typically addressed with OTC medications.    Patient Active Problem List   Diagnosis   • CAD (coronary artery disease)   • PAD (peripheral artery disease) ([facility])   • Essential hypertension   • Mixed hyperlipidemia   • Peripheral vascular disease ([facility])   • Edema of right lower extremity   • Annual physical exam   • Alcohol use   • Preoperative examination   • Sinus bradycardia, persistent   • Postphlebitic syndrome   • GERD   • Perennial rhinitis   • Non-smoker   • 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) (CMS/[facility])   • Bilateral pulmonary infiltrates   • Screening PSA (prostate specific antigen)   • Medicare annual wellness visit, subsequent   • Chronic persistent asthma   • Abnormal glucose     No Known Allergies    Current Outpatient Medications:   •  albuterol sulfate HFA (VENTOLIN HFA) 108 (90 Base) MCG/ACT inhaler, Inhale 2 puffs Every 4 (Four) Hours As Needed for Wheezing., Disp: 18 inhaler, Rfl: 5  •  aspirin 325 MG tablet, Take 325 mg by mouth Daily., Disp: , Rfl:   •  atorvastatin (LIPITOR) 20 MG tablet, Take 1 tablet by mouth every night at bedtime., Disp: 90 tablet, Rfl: 3  •  Bystolic 10  "MG tablet, TAKE 1 TABLET BY MOUTH EVERY DAY, Disp: 90 tablet, Rfl: 3  •  chlorthalidone (HYGROTON) 25 MG tablet, TAKE 1/2 TABLET BY MOUTH EVERY DAY, Disp: 45 tablet, Rfl: 3  •  lisinopril (PRINIVIL,ZESTRIL) 10 MG tablet, TAKE 1 TABLET BY MOUTH EVERY DAY, Disp: 90 tablet, Rfl: 3  •  Mepolizumab 100 MG/ML solution prefilled syringe, Inject 1 mL under the skin into the appropriate area as directed Every 28 (Twenty-Eight) Days., Disp: 1 each, Rfl: 11  •  omeprazole (priLOSEC) 20 MG capsule, Take 20 mg by mouth Daily., Disp: , Rfl:   •  Symbicort 160-4.5 MCG/ACT inhaler, TAKE 2 PUFFS BY MOUTH TWICE A DAY, Disp: 30.6 each, Rfl: 3  •  Triamcinolone Acetonide (NASACORT) 55 MCG/ACT nasal inhaler, 2 sprays into the nostril(s) as directed by provider 2 (Two) Times a Day., Disp: , Rfl:   MEDICATION LIST AND ALLERGIES REVIEWED.    Family History   Problem Relation Age of Onset   • Esophageal cancer Father         cause of death     Social History     Tobacco Use   • Smoking status: Never Smoker   • Smokeless tobacco: Never Used   Vaping Use   • Vaping Use: Never used   Substance Use Topics   • Alcohol use: Yes   • Drug use: No     Social History     Social History Narrative        Retired     Lifelong non-smoker    Drinks about 1 alcoholic beverage on a weekly basis     FAMILY AND SOCIAL HISTORY REVIEWED.    Review of Systems  ALSO REFER TO SCANNED ROS SHEET FROM SAME DATE.    /76 (BP Location: Left arm, Patient Position: Sitting, Cuff Size: Adult)   Pulse 56   Temp 97.7 °F (36.5 °C)   Resp 16   Ht 170.2 cm (67\")   Wt 69.9 kg (154 lb)   SpO2 95% Comment: room air at rest  BMI 24.12 kg/m²   Physical Exam  Vitals and nursing note reviewed.   Constitutional:       General: He is not in acute distress.     Appearance: He is well-developed. He is not diaphoretic.   HENT:      Head: Normocephalic and atraumatic.   Neck:      Thyroid: No thyromegaly.   Cardiovascular:      Rate and Rhythm: " Normal rate and regular rhythm.      Heart sounds: Normal heart sounds. No murmur heard.  Pulmonary:      Effort: Pulmonary effort is normal.      Breath sounds: Normal breath sounds. No stridor.   Chest:   Breasts:      Right: No supraclavicular adenopathy.      Left: No supraclavicular adenopathy.       Lymphadenopathy:      Cervical: No cervical adenopathy.      Upper Body:      Right upper body: No supraclavicular or epitrochlear adenopathy.      Left upper body: No supraclavicular or epitrochlear adenopathy.   Skin:     General: Skin is warm and dry.   Neurological:      Mental Status: He is alert and oriented to person, place, and time.   Psychiatric:         Behavior: Behavior normal.         Results     Eosinophil count normal on most recent CBC    Chest x-ray from March 2022 reviewed on PACS  No evidence of interstitial infiltrates    Immunization History   Administered Date(s) Administered   • COVID-19 (PFIZER) PURPLE CAP 01/11/2021, 02/01/2021, 10/02/2021   • FLUAD TRI 65YR+ 10/21/2019, 09/30/2020   • Fluad Quad 65+ 10/11/2021   • Fluzone High Dose =>65 Years ([employer] ONLY) 10/01/2014, 12/14/2016, 10/10/2017, 11/30/2017, 10/01/2018, 10/21/2019, 09/30/2020   • Fluzone High-Dose 65+yrs 09/30/2020   • Fluzone Split Quad (Multi-dose) 10/30/2015   • Hepatitis A 11/06/2018, 04/06/2019, 05/03/2019   • Pneumococcal Conjugate 13-Valent (PCV13) 12/17/2014   • Pneumococcal Polysaccharide (PPSV23) 06/25/2013, 11/16/2021   • Pneumococcal, Unspecified 06/25/2013   • Shingrix 03/18/2020, 05/01/2020, 06/10/2020   • Tdap 06/25/2013   • Zostavax 06/20/2012     Problem List       ICD-10-CM ICD-9-CM   1. 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) (CMS/[facility])  M30.1 446.4    D72.18    2. Non-smoker  Z78.9 V49.89   3. GERD  K21.9 530.81   4. Bilateral pulmonary infiltrates  R91.8 793.19       Discussion     He continues to do well from the standpoint of his [facility]  Insurance continues to cover his Nucala and he is  not having any problems with the infusions on a monthly basis    No evidence of elevated eosinophil count or chest x-ray on most recent evaluation  We will consider getting an ANCA level on return    I will plan to see him back in the fall with PFTs or earlier if there are any problems in the meantime    Moderate level of Medical Decision Making complexity based on 2 or more chronic stable illnesses and prescription drug management.    Jaspreet Quiroz MD  Note electronically signed    CC: Simon Easley MD

## 2022-04-28 ENCOUNTER — INFUSION (OUTPATIENT)
Dept: ONCOLOGY | Facility: HOSPITAL | Age: 77
End: 2022-04-28

## 2022-04-28 VITALS
WEIGHT: 154 LBS | BODY MASS INDEX: 24.17 KG/M2 | TEMPERATURE: 97.2 F | DIASTOLIC BLOOD PRESSURE: 66 MMHG | SYSTOLIC BLOOD PRESSURE: 131 MMHG | HEART RATE: 104 BPM | RESPIRATION RATE: 16 BRPM | HEIGHT: 67 IN

## 2022-04-28 DIAGNOSIS — M30.1 EOSINOPHILIC GRANULOMATOSIS WITH POLYANGIITIS (EGPA): Primary | ICD-10-CM

## 2022-04-28 DIAGNOSIS — D72.18 EOSINOPHILIC GRANULOMATOSIS WITH POLYANGIITIS (EGPA): Primary | ICD-10-CM

## 2022-04-28 PROCEDURE — 96372 THER/PROPH/DIAG INJ SC/IM: CPT

## 2022-04-28 PROCEDURE — 25010000002 MEPOLIZUMAB 100 MG RECONSTITUTED SOLUTION: Performed by: NURSE PRACTITIONER

## 2022-04-28 RX ADMIN — MEPOLIZUMAB 100 MG: 100 INJECTION, POWDER, FOR SOLUTION SUBCUTANEOUS at 08:59

## 2022-05-16 ENCOUNTER — OFFICE VISIT (OUTPATIENT)
Dept: INTERNAL MEDICINE | Facility: CLINIC | Age: 77
End: 2022-05-16

## 2022-05-16 ENCOUNTER — LAB (OUTPATIENT)
Dept: LAB | Facility: HOSPITAL | Age: 77
End: 2022-05-16

## 2022-05-16 VITALS
DIASTOLIC BLOOD PRESSURE: 58 MMHG | SYSTOLIC BLOOD PRESSURE: 120 MMHG | HEIGHT: 67 IN | BODY MASS INDEX: 23.7 KG/M2 | HEART RATE: 62 BPM | TEMPERATURE: 98 F | WEIGHT: 151 LBS

## 2022-05-16 DIAGNOSIS — I10 ESSENTIAL HYPERTENSION: ICD-10-CM

## 2022-05-16 DIAGNOSIS — I10 ESSENTIAL HYPERTENSION: Primary | ICD-10-CM

## 2022-05-16 DIAGNOSIS — E78.2 MIXED HYPERLIPIDEMIA: ICD-10-CM

## 2022-05-16 DIAGNOSIS — R10.13 EPIGASTRIC PAIN: ICD-10-CM

## 2022-05-16 DIAGNOSIS — R63.4 WEIGHT LOSS: ICD-10-CM

## 2022-05-16 DIAGNOSIS — R73.09 ABNORMAL GLUCOSE: ICD-10-CM

## 2022-05-16 PROCEDURE — 99214 OFFICE O/P EST MOD 30 MIN: CPT | Performed by: INTERNAL MEDICINE

## 2022-05-16 NOTE — PROGRESS NOTES
"[facility]     Uzair Jacob  1945   [id number]      Patient Care Team:  Simon Easley MD as PCP - General  Vic Blas MD as Consulting Physician (Cardiology)  Sukhdev Sun MD (Dermatology)  Jaspreet Quiroz MD as Emergency Attending (Pulmonary Disease)  Joey Elizalde MD as Consulting Physician (Ophthalmology)    Chief Complaint::   Chief Complaint   Patient presents with   • Hypertension        HPI  The patient comes in for follow-up of his hypertension, hyperlipidemia, and abnormal glucose. He continues to see cardiology for coronary artery disease and pulmonary for asthma. He is accompanied by his wife.    Prediabetes  The patient reports that he has lost 2 pounds since 6 months ago. He states that he has changed his diet. He drinks almond milk and eats bread that is dense in roughage. He states he monitors his carbohydrate intake. He states that he does not have the same appetite anymore. He does not eat nearly as much as he used to. He is trying to work his way back, but it is really hard to put weight back on after he has lost it. The patient is trying to add some carbohydrates.     Chronic persistent asthma  The patient's wife states that even with Nucala, he is still coughing a lot more than he did when he first went on Nucala. The patient occasionally coughs so hard that \"he feels like he is going to cough up the inside of him.\" He states it seems like when he gets to the end of a 28-day cycle, when he gets to day 23 or 24, he starts coughing. He reports that the allergies are still there. He does not have eosinophils as a contributing factor to it. He states he has had several CT scans. He stated he had a chest x-ray every year. The patient's wife states that she is concerned about him. He states that he has had many discs, but never an MRI.    Chronic Conditions:      Patient Active Problem List   Diagnosis   • CAD (coronary artery " disease)   • PAD (peripheral artery disease) ([facility])   • Essential hypertension   • Mixed hyperlipidemia   • Peripheral vascular disease ([facility])   • Edema of right lower extremity   • Annual physical exam   • Alcohol use   • Preoperative examination   • Sinus bradycardia, persistent   • Postphlebitic syndrome   • GERD   • Perennial rhinitis   • Non-smoker   • 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) (CMS/HCC)   • Bilateral pulmonary infiltrates   • Screening PSA (prostate specific antigen)   • Medicare annual wellness visit, subsequent   • Chronic persistent asthma   • Abnormal glucose        Past Medical History:   Diagnosis Date   • Asthma 3/8/2017   • CAD (coronary artery disease) 3/8/2017   • Chest pain     stent to LAD 2012   • Deep vein thrombosis (DVT) of right lower extremity ([facility]) 4/18/2019   • GERD (gastroesophageal reflux disease) 3/8/2017   • Hyperlipidemia 3/8/2017   • Hypertension 3/8/2017   • Nasal polyps     nasal polypectomy 1996   • PAD (peripheral artery disease) ([facility]) 3/8/2017       Past Surgical History:   Procedure Laterality Date   • CORONARY ANGIOPLASTY WITH STENT PLACEMENT  03/2012    Chest pain; stent to LAD   • NASAL POLYP SURGERY  1996    Nasal polyps   • POLYPECTOMY      Sinus polyp extraction.   • TONSILLECTOMY         Family History   Problem Relation Age of Onset   • Esophageal cancer Father         cause of death       Social History     Socioeconomic History   • Marital status:    Tobacco Use   • Smoking status: Never Smoker   • Smokeless tobacco: Never Used   Vaping Use   • Vaping Use: Never used   Substance and Sexual Activity   • Alcohol use: Yes   • Drug use: No   • Sexual activity: Defer       No Known Allergies      Current Outpatient Medications:   •  albuterol sulfate HFA (VENTOLIN HFA) 108 (90 Base) MCG/ACT inhaler, Inhale 2 puffs Every 4 (Four) Hours As Needed for Wheezing., Disp: 18 inhaler, Rfl: 5  •  aspirin 325 MG tablet, Take 325 mg by mouth Daily.,  "Disp: , Rfl:   •  atorvastatin (LIPITOR) 20 MG tablet, Take 1 tablet by mouth every night at bedtime., Disp: 90 tablet, Rfl: 3  •  Bystolic 10 MG tablet, TAKE 1 TABLET BY MOUTH EVERY DAY, Disp: 90 tablet, Rfl: 3  •  chlorthalidone (HYGROTON) 25 MG tablet, TAKE 1/2 TABLET BY MOUTH EVERY DAY, Disp: 45 tablet, Rfl: 3  •  lisinopril (PRINIVIL,ZESTRIL) 10 MG tablet, TAKE 1 TABLET BY MOUTH EVERY DAY, Disp: 90 tablet, Rfl: 3  •  Mepolizumab 100 MG/ML solution prefilled syringe, Inject 1 mL under the skin into the appropriate area as directed Every 28 (Twenty-Eight) Days., Disp: 1 each, Rfl: 11  •  omeprazole (priLOSEC) 20 MG capsule, Take 20 mg by mouth Daily., Disp: , Rfl:   •  Symbicort 160-4.5 MCG/ACT inhaler, TAKE 2 PUFFS BY MOUTH TWICE A DAY, Disp: 30.6 each, Rfl: 3  •  Triamcinolone Acetonide (NASACORT) 55 MCG/ACT nasal inhaler, 2 sprays into the nostril(s) as directed by provider 2 (Two) Times a Day., Disp: , Rfl:     Review of Systems   A review of systems was performed, and the pertinent positives are noted in the HPI.    Vital Signs  Vitals:    05/16/22 1122   BP: 120/58   BP Location: Right arm   Patient Position: Sitting   Cuff Size: Adult   Pulse: 62   Temp: 98 °F (36.7 °C)   TempSrc: Temporal   Weight: 68.5 kg (151 lb)   Height: 170.2 cm (67.01\")   PainSc: 0-No pain       Physical Exam  Vitals reviewed.   Constitutional:       Appearance: He is well-developed.   HENT:      Head: Normocephalic and atraumatic.   Cardiovascular:      Rate and Rhythm: Normal rate and regular rhythm.      Heart sounds: Normal heart sounds. No murmur heard.  Pulmonary:      Effort: Pulmonary effort is normal.      Breath sounds: Wheezing and rhonchi present.      Comments: Diffuse expiratory wheezing and rhonchi throughout lung fields.  Neurological:      Mental Status: He is alert and oriented to person, place, and time.          Procedures    ACE III MINI             Assessment/Plan:    Diagnoses and all orders for this " visit:    1. Essential hypertension (Primary)  -     CBC & Differential; Future    2. Mixed hyperlipidemia  -     Comprehensive Metabolic Panel; Future  -     Lipid Panel; Future    3. Abnormal glucose  -     Hemoglobin A1c; Future    4. Weight loss  -     US Abdomen Complete; Future    5. Epigastric pain  -     US Abdomen Complete; Future    1. Hypertension  - Blood pressure is well controlled on Bystolic, chlorthalidone, and lisinopril.    2. Hyperlipidemia  - A lipid panel is pending. Continue with atorvastatin and a healthy diet.    3. Abnormal glucose  - A1c is pending. Since the diagnosis of prediabetes, he has taken his diet very seriously and has actually lost about 20 pounds. He still has a good BMI at 23, but his wife has a pancreatic mass that is being worked up. She is very concerned about what she sees as weight loss. I think given the weight loss, it is reasonable to perform an abdominal ultrasound if nothing else for peace of mind, but no further work-up is needed unless the pancreas is not adequately seen on ultrasound.    He will follow up in 6 months.      Plan of care reviewed with patient at the conclusion of today's visit. Education was provided regarding diagnosis, management, and any prescribed or recommended OTC medications.Patient verbalizes understanding of and agreement with management plan.         Simon Easley MD             Transcribed from ambient dictation for Simon Easley MD by Rosi Holden.  05/16/22   13:44 EDT    Patient verbalized consent to the visit recording.

## 2022-05-17 LAB
ALBUMIN SERPL-MCNC: 3.4 G/DL (ref 3.5–5.2)
ALBUMIN/GLOB SERPL: 1.1 G/DL
ALP SERPL-CCNC: 59 U/L (ref 39–117)
ALT SERPL-CCNC: 15 U/L (ref 1–41)
AST SERPL-CCNC: 16 U/L (ref 1–40)
BASOPHILS # BLD AUTO: 0.07 10*3/MM3 (ref 0–0.2)
BASOPHILS NFR BLD AUTO: 0.6 % (ref 0–1.5)
BILIRUB SERPL-MCNC: 0.9 MG/DL (ref 0–1.2)
BUN SERPL-MCNC: 23 MG/DL (ref 8–23)
BUN/CREAT SERPL: 20.4 (ref 7–25)
CALCIUM SERPL-MCNC: 9.3 MG/DL (ref 8.6–10.5)
CHLORIDE SERPL-SCNC: 102 MMOL/L (ref 98–107)
CHOLEST SERPL-MCNC: 142 MG/DL (ref 0–200)
CO2 SERPL-SCNC: 26.4 MMOL/L (ref 22–29)
CREAT SERPL-MCNC: 1.13 MG/DL (ref 0.76–1.27)
EGFRCR SERPLBLD CKD-EPI 2021: 67.4 ML/MIN/1.73
EOSINOPHIL # BLD AUTO: 0.02 10*3/MM3 (ref 0–0.4)
EOSINOPHIL NFR BLD AUTO: 0.2 % (ref 0.3–6.2)
ERYTHROCYTE [DISTWIDTH] IN BLOOD BY AUTOMATED COUNT: 13.4 % (ref 12.3–15.4)
GLOBULIN SER CALC-MCNC: 3.1 GM/DL
GLUCOSE SERPL-MCNC: 104 MG/DL (ref 65–99)
HBA1C MFR BLD: 6.3 % (ref 4.8–5.6)
HCT VFR BLD AUTO: 44.3 % (ref 37.5–51)
HDLC SERPL-MCNC: 58 MG/DL (ref 40–60)
HGB BLD-MCNC: 14.1 G/DL (ref 13–17.7)
IMM GRANULOCYTES # BLD AUTO: 0.05 10*3/MM3 (ref 0–0.05)
IMM GRANULOCYTES NFR BLD AUTO: 0.4 % (ref 0–0.5)
LDLC SERPL CALC-MCNC: 71 MG/DL (ref 0–100)
LYMPHOCYTES # BLD AUTO: 1.31 10*3/MM3 (ref 0.7–3.1)
LYMPHOCYTES NFR BLD AUTO: 10.4 % (ref 19.6–45.3)
MCH RBC QN AUTO: 28.9 PG (ref 26.6–33)
MCHC RBC AUTO-ENTMCNC: 31.8 G/DL (ref 31.5–35.7)
MCV RBC AUTO: 90.8 FL (ref 79–97)
MONOCYTES # BLD AUTO: 0.93 10*3/MM3 (ref 0.1–0.9)
MONOCYTES NFR BLD AUTO: 7.4 % (ref 5–12)
NEUTROPHILS # BLD AUTO: 10.17 10*3/MM3 (ref 1.7–7)
NEUTROPHILS NFR BLD AUTO: 81 % (ref 42.7–76)
NRBC BLD AUTO-RTO: 0 /100 WBC (ref 0–0.2)
PLATELET # BLD AUTO: 257 10*3/MM3 (ref 140–450)
POTASSIUM SERPL-SCNC: 4.1 MMOL/L (ref 3.5–5.2)
PROT SERPL-MCNC: 6.5 G/DL (ref 6–8.5)
RBC # BLD AUTO: 4.88 10*6/MM3 (ref 4.14–5.8)
SODIUM SERPL-SCNC: 141 MMOL/L (ref 136–145)
TRIGL SERPL-MCNC: 63 MG/DL (ref 0–150)
VLDLC SERPL CALC-MCNC: 13 MG/DL (ref 5–40)
WBC # BLD AUTO: 12.55 10*3/MM3 (ref 3.4–10.8)

## 2022-05-26 ENCOUNTER — INFUSION (OUTPATIENT)
Dept: ONCOLOGY | Facility: HOSPITAL | Age: 77
End: 2022-05-26

## 2022-05-26 VITALS
DIASTOLIC BLOOD PRESSURE: 82 MMHG | HEART RATE: 70 BPM | SYSTOLIC BLOOD PRESSURE: 145 MMHG | RESPIRATION RATE: 20 BRPM | TEMPERATURE: 97.6 F

## 2022-05-26 DIAGNOSIS — D72.18 EOSINOPHILIC GRANULOMATOSIS WITH POLYANGIITIS (EGPA): Primary | ICD-10-CM

## 2022-05-26 DIAGNOSIS — M30.1 EOSINOPHILIC GRANULOMATOSIS WITH POLYANGIITIS (EGPA): Primary | ICD-10-CM

## 2022-05-26 PROCEDURE — 96372 THER/PROPH/DIAG INJ SC/IM: CPT

## 2022-05-26 PROCEDURE — 25010000002 MEPOLIZUMAB 100 MG RECONSTITUTED SOLUTION: Performed by: NURSE PRACTITIONER

## 2022-05-26 RX ADMIN — MEPOLIZUMAB 100 MG: 100 INJECTION, POWDER, FOR SOLUTION SUBCUTANEOUS at 08:59

## 2022-06-16 ENCOUNTER — HOSPITAL ENCOUNTER (OUTPATIENT)
Dept: ULTRASOUND IMAGING | Facility: HOSPITAL | Age: 77
Discharge: HOME | End: 2022-06-16
Admitting: INTERNAL MEDICINE

## 2022-06-16 DIAGNOSIS — R10.13 EPIGASTRIC PAIN: ICD-10-CM

## 2022-06-16 DIAGNOSIS — R63.4 WEIGHT LOSS: ICD-10-CM

## 2022-06-16 PROCEDURE — 76700 US EXAM ABDOM COMPLETE: CPT

## 2022-06-23 ENCOUNTER — INFUSION (OUTPATIENT)
Dept: ONCOLOGY | Facility: HOSPITAL | Age: 77
End: 2022-06-23

## 2022-06-23 VITALS
SYSTOLIC BLOOD PRESSURE: 127 MMHG | TEMPERATURE: 97.8 F | DIASTOLIC BLOOD PRESSURE: 75 MMHG | RESPIRATION RATE: 16 BRPM | HEART RATE: 67 BPM

## 2022-06-23 DIAGNOSIS — M30.1 EOSINOPHILIC GRANULOMATOSIS WITH POLYANGIITIS (EGPA): Primary | ICD-10-CM

## 2022-06-23 DIAGNOSIS — D72.18 EOSINOPHILIC GRANULOMATOSIS WITH POLYANGIITIS (EGPA): Primary | ICD-10-CM

## 2022-06-23 PROCEDURE — 25010000002 MEPOLIZUMAB 100 MG RECONSTITUTED SOLUTION: Performed by: NURSE PRACTITIONER

## 2022-06-23 PROCEDURE — 96372 THER/PROPH/DIAG INJ SC/IM: CPT

## 2022-06-23 RX ADMIN — MEPOLIZUMAB 100 MG: 100 INJECTION, POWDER, FOR SOLUTION SUBCUTANEOUS at 09:04

## 2022-07-13 DIAGNOSIS — J45.909 IDIOPATHIC ASTHMA: ICD-10-CM

## 2022-07-13 RX ORDER — ALBUTEROL SULFATE 90 UG/1
2 AEROSOL, METERED RESPIRATORY (INHALATION) EVERY 4 HOURS PRN
Qty: 18 G | Refills: 1 | Status: SHIPPED | OUTPATIENT
Start: 2022-07-13

## 2022-07-13 NOTE — TELEPHONE ENCOUNTER
Patient's wife called and requested a refill for his albuterol inhaler, sent refill to [facility] pharmacy as requested.

## 2022-07-21 ENCOUNTER — INFUSION (OUTPATIENT)
Dept: ONCOLOGY | Facility: HOSPITAL | Age: 77
End: 2022-07-21

## 2022-07-21 VITALS
DIASTOLIC BLOOD PRESSURE: 75 MMHG | HEART RATE: 69 BPM | RESPIRATION RATE: 18 BRPM | TEMPERATURE: 98.4 F | SYSTOLIC BLOOD PRESSURE: 150 MMHG

## 2022-07-21 DIAGNOSIS — M30.1 EOSINOPHILIC GRANULOMATOSIS WITH POLYANGIITIS (EGPA): Primary | ICD-10-CM

## 2022-07-21 DIAGNOSIS — D72.18 EOSINOPHILIC GRANULOMATOSIS WITH POLYANGIITIS (EGPA): Primary | ICD-10-CM

## 2022-07-21 PROCEDURE — 96372 THER/PROPH/DIAG INJ SC/IM: CPT

## 2022-07-21 PROCEDURE — 25010000002 MEPOLIZUMAB 100 MG RECONSTITUTED SOLUTION: Performed by: NURSE PRACTITIONER

## 2022-07-21 RX ADMIN — MEPOLIZUMAB 100 MG: 100 INJECTION, POWDER, FOR SOLUTION SUBCUTANEOUS at 09:02

## 2022-08-18 ENCOUNTER — INFUSION (OUTPATIENT)
Dept: ONCOLOGY | Facility: HOSPITAL | Age: 77
End: 2022-08-18

## 2022-08-18 VITALS
DIASTOLIC BLOOD PRESSURE: 70 MMHG | SYSTOLIC BLOOD PRESSURE: 140 MMHG | TEMPERATURE: 98.1 F | RESPIRATION RATE: 18 BRPM | HEART RATE: 62 BPM

## 2022-08-18 DIAGNOSIS — D72.18 EOSINOPHILIC GRANULOMATOSIS WITH POLYANGIITIS (EGPA): Primary | ICD-10-CM

## 2022-08-18 DIAGNOSIS — M30.1 EOSINOPHILIC GRANULOMATOSIS WITH POLYANGIITIS (EGPA): Primary | ICD-10-CM

## 2022-08-18 PROCEDURE — 25010000002 MEPOLIZUMAB 100 MG RECONSTITUTED SOLUTION: Performed by: NURSE PRACTITIONER

## 2022-08-18 PROCEDURE — 96372 THER/PROPH/DIAG INJ SC/IM: CPT

## 2022-08-18 RX ADMIN — MEPOLIZUMAB 100 MG: 100 INJECTION, POWDER, FOR SOLUTION SUBCUTANEOUS at 09:08

## 2022-09-15 ENCOUNTER — INFUSION (OUTPATIENT)
Dept: ONCOLOGY | Facility: HOSPITAL | Age: 77
End: 2022-09-15

## 2022-09-15 VITALS
RESPIRATION RATE: 18 BRPM | DIASTOLIC BLOOD PRESSURE: 77 MMHG | HEART RATE: 66 BPM | SYSTOLIC BLOOD PRESSURE: 141 MMHG | TEMPERATURE: 97.9 F

## 2022-09-15 DIAGNOSIS — M30.1 EOSINOPHILIC GRANULOMATOSIS WITH POLYANGIITIS (EGPA): Primary | ICD-10-CM

## 2022-09-15 DIAGNOSIS — D72.18 EOSINOPHILIC GRANULOMATOSIS WITH POLYANGIITIS (EGPA): Primary | ICD-10-CM

## 2022-09-15 PROCEDURE — 96372 THER/PROPH/DIAG INJ SC/IM: CPT

## 2022-09-15 PROCEDURE — 25010000002 MEPOLIZUMAB 100 MG RECONSTITUTED SOLUTION: Performed by: NURSE PRACTITIONER

## 2022-09-15 RX ADMIN — MEPOLIZUMAB 100 MG: 100 INJECTION, POWDER, FOR SOLUTION SUBCUTANEOUS at 08:57

## 2022-09-21 RX ORDER — LISINOPRIL 10 MG/1
TABLET ORAL
Qty: 90 TABLET | Refills: 3 | Status: SHIPPED | OUTPATIENT
Start: 2022-09-21

## 2022-10-13 ENCOUNTER — INFUSION (OUTPATIENT)
Dept: ONCOLOGY | Facility: HOSPITAL | Age: 77
End: 2022-10-13

## 2022-10-13 VITALS
HEART RATE: 93 BPM | TEMPERATURE: 97.4 F | DIASTOLIC BLOOD PRESSURE: 88 MMHG | RESPIRATION RATE: 18 BRPM | SYSTOLIC BLOOD PRESSURE: 143 MMHG

## 2022-10-13 DIAGNOSIS — M30.1 EOSINOPHILIC GRANULOMATOSIS WITH POLYANGIITIS (EGPA): Primary | ICD-10-CM

## 2022-10-13 DIAGNOSIS — D72.18 EOSINOPHILIC GRANULOMATOSIS WITH POLYANGIITIS (EGPA): Primary | ICD-10-CM

## 2022-10-13 PROCEDURE — 25010000002 MEPOLIZUMAB 100 MG RECONSTITUTED SOLUTION: Performed by: NURSE PRACTITIONER

## 2022-10-13 PROCEDURE — 96372 THER/PROPH/DIAG INJ SC/IM: CPT

## 2022-10-13 RX ADMIN — MEPOLIZUMAB 100 MG: 100 INJECTION, POWDER, FOR SOLUTION SUBCUTANEOUS at 09:13

## 2022-10-27 ENCOUNTER — OFFICE VISIT (OUTPATIENT)
Dept: PULMONOLOGY | Facility: CLINIC | Age: 77
End: 2022-10-27

## 2022-10-27 VITALS
HEIGHT: 68 IN | DIASTOLIC BLOOD PRESSURE: 84 MMHG | HEART RATE: 65 BPM | RESPIRATION RATE: 12 BRPM | SYSTOLIC BLOOD PRESSURE: 132 MMHG | OXYGEN SATURATION: 93 % | BODY MASS INDEX: 23.79 KG/M2 | WEIGHT: 157 LBS | TEMPERATURE: 97.3 F

## 2022-10-27 DIAGNOSIS — D72.18 EOSINOPHILIC GRANULOMATOSIS WITH POLYANGIITIS (EGPA): ICD-10-CM

## 2022-10-27 DIAGNOSIS — Z78.9 NON-SMOKER: ICD-10-CM

## 2022-10-27 DIAGNOSIS — M30.1 EOSINOPHILIC GRANULOMATOSIS WITH POLYANGIITIS (EGPA): ICD-10-CM

## 2022-10-27 DIAGNOSIS — K21.9 CHRONIC GERD: ICD-10-CM

## 2022-10-27 DIAGNOSIS — J45.909 IDIOPATHIC ASTHMA: Primary | ICD-10-CM

## 2022-10-27 PROCEDURE — 99214 OFFICE O/P EST MOD 30 MIN: CPT | Performed by: INTERNAL MEDICINE

## 2022-10-27 PROCEDURE — 94726 PLETHYSMOGRAPHY LUNG VOLUMES: CPT | Performed by: INTERNAL MEDICINE

## 2022-10-27 PROCEDURE — 94729 DIFFUSING CAPACITY: CPT | Performed by: INTERNAL MEDICINE

## 2022-10-27 PROCEDURE — 94375 RESPIRATORY FLOW VOLUME LOOP: CPT | Performed by: INTERNAL MEDICINE

## 2022-10-27 RX ORDER — BUDESONIDE, GLYCOPYRROLATE, AND FORMOTEROL FUMARATE 160; 9; 4.8 UG/1; UG/1; UG/1
2 AEROSOL, METERED RESPIRATORY (INHALATION) 2 TIMES DAILY
Qty: 3 EACH | Refills: 3 | Status: SHIPPED | OUTPATIENT
Start: 2022-10-27

## 2022-10-27 NOTE — PROGRESS NOTES
PULMONARY  NOTE    Chief Complaint     Chronic persistent asthma, rule out EGPA, perennial rhinitis, non-smoker, reflux    History of Present Illness     77-year-old male returns today for follow-up  The last saw him 4/7/2022    He has severe obstructive airways disease due to chronic persistent asthma  He has a positive atypical ANCA and elevated eosinophil count  We have been treating him with Nucala with a presumptive diagnosis of EGPA  He has had significant improvement in symptoms, resolution of previously noted pulmonary infiltrates, and some improvement in spirometry    Has had no acute exacerbations since I last saw him    He does not feel limited by shortness of breath on a daily basis    Medications are being covered well through his insurance    He has a history of reflux but not having reflux symptoms on a daily basis    Patient Active Problem List   Diagnosis   • CAD (coronary artery disease)   • PAD (peripheral artery disease) ([facility])   • Essential hypertension   • Mixed hyperlipidemia   • Peripheral vascular disease ([facility])   • Edema of right lower extremity   • Annual physical exam   • Alcohol use   • Preoperative examination   • Sinus bradycardia, persistent   • Postphlebitic syndrome   • GERD   • Perennial rhinitis   • Non-smoker   • 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) (CMS/[facility])   • Bilateral pulmonary infiltrates   • Screening PSA (prostate specific antigen)   • Medicare annual wellness visit, subsequent   • Chronic persistent asthma   • Abnormal glucose     No Known Allergies    Current Outpatient Medications:   •  albuterol sulfate HFA (Ventolin HFA) 108 (90 Base) MCG/ACT inhaler, Inhale 2 puffs Every 4 (Four) Hours As Needed for Wheezing., Disp: 18 g, Rfl: 1  •  aspirin 325 MG tablet, Take 325 mg by mouth Daily., Disp: , Rfl:   •  atorvastatin (LIPITOR) 20 MG tablet, Take 1 tablet by mouth every night at bedtime., Disp: 90 tablet, Rfl: 3  •  Bystolic 10 MG tablet, TAKE 1  "TABLET BY MOUTH EVERY DAY, Disp: 90 tablet, Rfl: 3  •  chlorthalidone (HYGROTON) 25 MG tablet, TAKE 1/2 TABLET BY MOUTH EVERY DAY, Disp: 45 tablet, Rfl: 3  •  lisinopril (PRINIVIL,ZESTRIL) 10 MG tablet, TAKE 1 TABLET BY MOUTH EVERY DAY, Disp: 90 tablet, Rfl: 3  •  Mepolizumab 100 MG/ML solution prefilled syringe, Inject 1 mL under the skin into the appropriate area as directed Every 28 (Twenty-Eight) Days., Disp: 1 each, Rfl: 11  •  omeprazole (priLOSEC) 20 MG capsule, Take 20 mg by mouth Daily., Disp: , Rfl:   •  Triamcinolone Acetonide (NASACORT) 55 MCG/ACT nasal inhaler, 2 sprays into the nostril(s) as directed by provider 2 (Two) Times a Day., Disp: , Rfl:   •  Budeson-Glycopyrrol-Formoterol (Breztri Aerosphere) 160-9-4.8 MCG/ACT aerosol inhaler, Inhale 2 puffs 2 (Two) Times a Day., Disp: 3 each, Rfl: 3  MEDICATION LIST AND ALLERGIES REVIEWED.    Family History   Problem Relation Age of Onset   • Esophageal cancer Father         cause of death   • Cancer Father    • Asthma Sister    • Asthma Brother      Social History     Tobacco Use   • Smoking status: Never   • Smokeless tobacco: Never   Vaping Use   • Vaping Use: Never used   Substance Use Topics   • Alcohol use: Yes   • Drug use: Never     Social History     Social History Narrative        Retired     Lifelong non-smoker    Drinks about 1 alcoholic beverage on a weekly basis     FAMILY AND SOCIAL HISTORY REVIEWED.    Review of Systems  ALSO REFER TO SCANNED ROS SHEET FROM SAME DATE.    /84 (BP Location: Left arm, Patient Position: Sitting, Cuff Size: Adult)   Pulse 65   Temp 97.3 °F (36.3 °C) (Infrared)   Resp 12   Ht 172.7 cm (68\")   Wt 71.2 kg (157 lb)   SpO2 93%   BMI 23.87 kg/m²   Physical Exam  Vitals and nursing note reviewed.   Constitutional:       General: He is not in acute distress.     Appearance: He is well-developed. He is not diaphoretic.   HENT:      Head: Normocephalic and atraumatic.   Neck:      " Thyroid: No thyromegaly.   Cardiovascular:      Rate and Rhythm: Normal rate and regular rhythm.      Heart sounds: Normal heart sounds. No murmur heard.  Pulmonary:      Effort: Pulmonary effort is normal.      Breath sounds: No stridor.      Comments: A few wheezes bilaterally  Lymphadenopathy:      Cervical: No cervical adenopathy.      Upper Body:      Right upper body: No supraclavicular or epitrochlear adenopathy.      Left upper body: No supraclavicular or epitrochlear adenopathy.   Skin:     General: Skin is warm and dry.   Neurological:      Mental Status: He is alert and oriented to person, place, and time.   Psychiatric:         Behavior: Behavior normal.         Results     PFTs reveal severe airway obstruction, no restriction, and a normal diffusion capacity    Immunization History   Administered Date(s) Administered   • COVID-19 (PFIZER) PURPLE CAP 01/11/2021, 02/01/2021, 10/02/2021   • Covid-19 (Pfizer) Gray Cap 06/11/2022   • FLUAD TRI 65YR+ 10/21/2019, 09/30/2020   • Fluad Quad 65+ 10/11/2021   • Fluzone High Dose =>65 Years ([employer] ONLY) 10/01/2014, 12/14/2016, 10/10/2017, 11/30/2017, 10/01/2018, 10/21/2019, 09/30/2020   • Fluzone High-Dose 65+yrs 09/30/2020   • Fluzone Quad >6mos (Multi-dose) 10/30/2015   • Hepatitis A 11/06/2018, 04/06/2019, 05/03/2019   • Pneumococcal Conjugate 13-Valent (PCV13) 12/17/2014   • Pneumococcal Polysaccharide (PPSV23) 06/25/2013, 11/16/2021   • Pneumococcal, Unspecified 06/25/2013   • Shingrix 03/18/2020, 05/01/2020, 06/10/2020   • Tdap 06/25/2013   • Zostavax 06/20/2012     Problem List       ICD-10-CM ICD-9-CM   1. Chronic persistent asthma  J45.909 493.90   2. GERD  K21.9 530.81   3. Non-smoker  Z78.9 V49.89   4. 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) (CMS/[facility])  M30.1 446.4    D72.18        Discussion     Symptomatically is doing okay with no major exacerbations but I do not think were doing as good a job as we possibly could with his airway  obstruction  I am going to add a LAMA to his ICS/LABA.  It looks like Breztri is covered  I submit a prescription and gave him written instructions    Last eosinophil count was normal  I would like to see another ANCA  We might consider increasing his Nucala to 300 mg a month    I will plan to see him back after the first of the year for follow-up or earlier if there are any problems in the meantime    Would continue to recommend reflux precautions    Moderate level of Medical Decision Making complexity based on 2 or more chronic stable illnesses and prescription drug management.    Jaspreet Quiroz MD  Note electronically signed    CC: Simon Easley MD

## 2022-11-09 DIAGNOSIS — J45.909 IDIOPATHIC ASTHMA: ICD-10-CM

## 2022-11-10 ENCOUNTER — INFUSION (OUTPATIENT)
Dept: ONCOLOGY | Facility: HOSPITAL | Age: 77
End: 2022-11-10

## 2022-11-10 VITALS
DIASTOLIC BLOOD PRESSURE: 75 MMHG | RESPIRATION RATE: 16 BRPM | TEMPERATURE: 97.9 F | SYSTOLIC BLOOD PRESSURE: 148 MMHG | HEART RATE: 75 BPM

## 2022-11-10 DIAGNOSIS — D72.18 EOSINOPHILIC GRANULOMATOSIS WITH POLYANGIITIS (EGPA): Primary | ICD-10-CM

## 2022-11-10 DIAGNOSIS — M30.1 EOSINOPHILIC GRANULOMATOSIS WITH POLYANGIITIS (EGPA): Primary | ICD-10-CM

## 2022-11-10 PROCEDURE — 25010000002 MEPOLIZUMAB 100 MG RECONSTITUTED SOLUTION: Performed by: NURSE PRACTITIONER

## 2022-11-10 PROCEDURE — 96372 THER/PROPH/DIAG INJ SC/IM: CPT

## 2022-11-10 RX ADMIN — MEPOLIZUMAB 100 MG: 100 INJECTION, POWDER, FOR SOLUTION SUBCUTANEOUS at 09:01

## 2022-11-14 RX ORDER — NEBIVOLOL 10 MG/1
TABLET ORAL
Qty: 90 TABLET | Refills: 3 | Status: SHIPPED | OUTPATIENT
Start: 2022-11-14

## 2022-11-16 DIAGNOSIS — J45.909 IDIOPATHIC ASTHMA: ICD-10-CM

## 2022-11-16 RX ORDER — BUDESONIDE AND FORMOTEROL FUMARATE DIHYDRATE 160; 4.5 UG/1; UG/1
AEROSOL RESPIRATORY (INHALATION)
Qty: 30.6 EACH | Refills: 3 | OUTPATIENT
Start: 2022-11-16

## 2022-11-22 ENCOUNTER — LAB (OUTPATIENT)
Dept: LAB | Facility: HOSPITAL | Age: 77
End: 2022-11-22

## 2022-11-22 DIAGNOSIS — E78.2 MIXED HYPERLIPIDEMIA: ICD-10-CM

## 2022-11-22 DIAGNOSIS — Z12.5 SCREENING PSA (PROSTATE SPECIFIC ANTIGEN): ICD-10-CM

## 2022-11-22 DIAGNOSIS — I10 ESSENTIAL HYPERTENSION: ICD-10-CM

## 2022-11-22 DIAGNOSIS — I10 ESSENTIAL HYPERTENSION: Primary | ICD-10-CM

## 2022-11-22 DIAGNOSIS — R73.09 ABNORMAL GLUCOSE: ICD-10-CM

## 2022-11-23 ENCOUNTER — OFFICE VISIT (OUTPATIENT)
Dept: INTERNAL MEDICINE | Facility: CLINIC | Age: 77
End: 2022-11-23

## 2022-11-23 VITALS
WEIGHT: 156.2 LBS | TEMPERATURE: 98.2 F | HEART RATE: 72 BPM | SYSTOLIC BLOOD PRESSURE: 124 MMHG | BODY MASS INDEX: 23.67 KG/M2 | DIASTOLIC BLOOD PRESSURE: 62 MMHG | HEIGHT: 68 IN

## 2022-11-23 DIAGNOSIS — R73.09 ABNORMAL GLUCOSE: ICD-10-CM

## 2022-11-23 DIAGNOSIS — E78.2 MIXED HYPERLIPIDEMIA: ICD-10-CM

## 2022-11-23 DIAGNOSIS — I73.9 PAD (PERIPHERAL ARTERY DISEASE): ICD-10-CM

## 2022-11-23 DIAGNOSIS — Z00.00 MEDICARE ANNUAL WELLNESS VISIT, SUBSEQUENT: Primary | ICD-10-CM

## 2022-11-23 DIAGNOSIS — I10 ESSENTIAL HYPERTENSION: ICD-10-CM

## 2022-11-23 DIAGNOSIS — I25.10 CORONARY ARTERY DISEASE INVOLVING NATIVE CORONARY ARTERY OF NATIVE HEART WITHOUT ANGINA PECTORIS: ICD-10-CM

## 2022-11-23 LAB
ALBUMIN SERPL-MCNC: 3.9 G/DL (ref 3.5–5.2)
ALBUMIN/CREAT UR: 3 MG/G CREAT (ref 0–29)
ALBUMIN/GLOB SERPL: 1.4 G/DL
ALP SERPL-CCNC: 75 U/L (ref 39–117)
ALT SERPL-CCNC: 10 U/L (ref 1–41)
AST SERPL-CCNC: 14 U/L (ref 1–40)
BASOPHILS # BLD AUTO: 0.06 10*3/MM3 (ref 0–0.2)
BASOPHILS NFR BLD AUTO: 0.6 % (ref 0–1.5)
BILIRUB SERPL-MCNC: 1.3 MG/DL (ref 0–1.2)
BUN SERPL-MCNC: 15 MG/DL (ref 8–23)
BUN/CREAT SERPL: 14.3 (ref 7–25)
CALCIUM SERPL-MCNC: 9.1 MG/DL (ref 8.6–10.5)
CHLORIDE SERPL-SCNC: 101 MMOL/L (ref 98–107)
CHOLEST SERPL-MCNC: 167 MG/DL (ref 0–200)
CO2 SERPL-SCNC: 31.6 MMOL/L (ref 22–29)
CREAT SERPL-MCNC: 1.05 MG/DL (ref 0.76–1.27)
CREAT UR-MCNC: 148.4 MG/DL
EGFRCR SERPLBLD CKD-EPI 2021: 73.1 ML/MIN/1.73
EOSINOPHIL # BLD AUTO: 0.03 10*3/MM3 (ref 0–0.4)
EOSINOPHIL NFR BLD AUTO: 0.3 % (ref 0.3–6.2)
ERYTHROCYTE [DISTWIDTH] IN BLOOD BY AUTOMATED COUNT: 13.2 % (ref 12.3–15.4)
GLOBULIN SER CALC-MCNC: 2.7 GM/DL
GLUCOSE SERPL-MCNC: 87 MG/DL (ref 65–99)
HBA1C MFR BLD: 6.2 % (ref 4.8–5.6)
HCT VFR BLD AUTO: 45.9 % (ref 37.5–51)
HDLC SERPL-MCNC: 74 MG/DL (ref 40–60)
HGB BLD-MCNC: 15.2 G/DL (ref 13–17.7)
IMM GRANULOCYTES # BLD AUTO: 0.03 10*3/MM3 (ref 0–0.05)
IMM GRANULOCYTES NFR BLD AUTO: 0.3 % (ref 0–0.5)
LDLC SERPL CALC-MCNC: 81 MG/DL (ref 0–100)
LYMPHOCYTES # BLD AUTO: 1.51 10*3/MM3 (ref 0.7–3.1)
LYMPHOCYTES NFR BLD AUTO: 15 % (ref 19.6–45.3)
MCH RBC QN AUTO: 29.6 PG (ref 26.6–33)
MCHC RBC AUTO-ENTMCNC: 33.1 G/DL (ref 31.5–35.7)
MCV RBC AUTO: 89.3 FL (ref 79–97)
MICROALBUMIN UR-MCNC: 5.1 UG/ML
MONOCYTES # BLD AUTO: 1.13 10*3/MM3 (ref 0.1–0.9)
MONOCYTES NFR BLD AUTO: 11.2 % (ref 5–12)
NEUTROPHILS # BLD AUTO: 7.3 10*3/MM3 (ref 1.7–7)
NEUTROPHILS NFR BLD AUTO: 72.6 % (ref 42.7–76)
NRBC BLD AUTO-RTO: 0 /100 WBC (ref 0–0.2)
PLATELET # BLD AUTO: 238 10*3/MM3 (ref 140–450)
POTASSIUM SERPL-SCNC: 4.2 MMOL/L (ref 3.5–5.2)
PROT SERPL-MCNC: 6.6 G/DL (ref 6–8.5)
PSA SERPL-MCNC: 3.01 NG/ML (ref 0–4)
RBC # BLD AUTO: 5.14 10*6/MM3 (ref 4.14–5.8)
SODIUM SERPL-SCNC: 142 MMOL/L (ref 136–145)
TRIGL SERPL-MCNC: 63 MG/DL (ref 0–150)
VLDLC SERPL CALC-MCNC: 12 MG/DL (ref 5–40)
WBC # BLD AUTO: 10.06 10*3/MM3 (ref 3.4–10.8)

## 2022-11-23 PROCEDURE — 1159F MED LIST DOCD IN RCRD: CPT | Performed by: INTERNAL MEDICINE

## 2022-11-23 PROCEDURE — 96160 PT-FOCUSED HLTH RISK ASSMT: CPT | Performed by: INTERNAL MEDICINE

## 2022-11-23 PROCEDURE — G0439 PPPS, SUBSEQ VISIT: HCPCS | Performed by: INTERNAL MEDICINE

## 2022-11-23 PROCEDURE — 99397 PER PM REEVAL EST PAT 65+ YR: CPT | Performed by: INTERNAL MEDICINE

## 2022-11-23 PROCEDURE — 1170F FXNL STATUS ASSESSED: CPT | Performed by: INTERNAL MEDICINE

## 2022-11-23 NOTE — PROGRESS NOTES
QUICK REFERENCE INFORMATION:  The ABCs of the Annual Wellness Visit    Subsequent Medicare Wellness Visit    HEALTH RISK ASSESSMENT    1945    Recent Hospitalizations:  No hospitalization(s) within the last year..        Current Medical Providers:  Patient Care Team:  Simon Easley MD as PCP - General  Vic Blas MD as Consulting Physician (Cardiology)  Sukhdev Sun MD (Dermatology)  Jaspreet Quiroz MD as Emergency Attending (Pulmonary Disease)  Joey Elizalde MD as Consulting Physician (Ophthalmology)        Smoking Status:  Social History     Tobacco Use   Smoking Status Never   Smokeless Tobacco Never       Alcohol Consumption:  Social History     Substance and Sexual Activity   Alcohol Use Yes       Depression Screen:   PHQ-2/PHQ-9 Depression Screening 11/23/2022   Retired PHQ-9 Total Score -   Retired Total Score -   Little Interest or Pleasure in Doing Things 0-->not at all   Feeling Down, Depressed or Hopeless 0-->not at all   PHQ-9: Brief Depression Severity Measure Score 0       Health Habits and Functional and Cognitive Screening:  Functional & Cognitive Status 11/23/2022   Do you have difficulty preparing food and eating? No   Do you have difficulty bathing yourself, getting dressed or grooming yourself? No   Do you have difficulty using the toilet? No   Do you have difficulty moving around from place to place? No   Do you have trouble with steps or getting out of a bed or a chair? No   Current Diet Well Balanced Diet   Dental Exam Up to date   Eye Exam Up to date   Exercise (times per week) 0 times per week   Current Exercises Include No Regular Exercise        Exercise Comment -   Current Exercise Activities Include -   Do you need help using the phone?  No   Are you deaf or do you have serious difficulty hearing?  No   Do you need help with transportation? No   Do you need help shopping? No   Do you need help preparing meals?  No   Do you need help with  housework?  No   Do you need help with laundry? No   Do you need help taking your medications? No   Do you need help managing money? No   Do you ever drive or ride in a car without wearing a seat belt? No   Have you felt unusual stress, anger or loneliness in the last month? No   Who do you live with? Spouse   If you need help, do you have trouble finding someone available to you? No   Have you been bothered in the last four weeks by sexual problems? No   Do you have difficulty concentrating, remembering or making decisions? No       Fall Risk Screen:  STEADI Fall Risk Assessment was completed, and patient is at LOW risk for falls.Assessment completed on:11/23/2022    ACE III MINI        Does the patient have evidence of cognitive impairment? No    Aspirin use counseling: Taking ASA appropriately as indicated    Recent Lab Results:  CMP:  Lab Results   Component Value Date    BUN 15 11/22/2022    CREATININE 1.05 11/22/2022    EGFRIFNONA 65 11/11/2021    EGFRIFAFRI 79 11/11/2021    BCR 14.3 11/22/2022     11/22/2022    K 4.2 11/22/2022    CO2 31.6 (H) 11/22/2022    CALCIUM 9.1 11/22/2022    PROTENTOTREF 6.6 11/22/2022    ALBUMIN 3.90 11/22/2022    LABGLOBREF 2.7 11/22/2022    LABIL2 1.4 11/22/2022    BILITOT 1.3 (H) 11/22/2022    ALKPHOS 75 11/22/2022    AST 14 11/22/2022    ALT 10 11/22/2022     HbA1c:  Lab Results   Component Value Date    HGBA1C 6.20 (H) 11/22/2022    HGBA1C 6.30 (H) 05/16/2022     Microalbumin:  Lab Results   Component Value Date    MICROALBUR 5.1 11/22/2022     Lipid Panel  Lab Results   Component Value Date    TRIG 63 11/22/2022    HDL 74 (H) 11/22/2022    LDL 81 11/22/2022    AST 14 11/22/2022    ALT 10 11/22/2022       Visual Acuity:  No results found.    Age-appropriate Screening Schedule:  Refer to the list below for future screening recommendations based on patient's age, sex and/or medical conditions. Orders for these recommended tests are listed in the plan section. The patient has  been provided with a written plan.    Health Maintenance   Topic Date Due   • TDAP/TD VACCINES (2 - Td or Tdap) 06/25/2023   • LIPID PANEL  11/22/2023   • INFLUENZA VACCINE  Completed   • ZOSTER VACCINE  Completed        Subjective   History of Present Illness    Uzair Jacob is a 77 y.o. male who presents for a Subsequent Wellness Visit and for follow-up of hypertension, hyperlipidemia, coronary artery disease, peripheral arterial disease, and abnormal glucose. He sees pulmonary for chronic persistent asthma and cardiology for coronary artery disease.    Asthma  The patient states that he is doing well. He states that he is breathing well. He states that he saw a pulmonary in 09/2022 and his peak blowing out was low. He reports that he was prescribed Breztri. He notes that he is not impressed with this, adding that he does not see a great difference.    Health maintenance  He states that he is capable of doing everything he did last year. He states that his strength and stamina are good. He states that he stays moderately active. He reports that he has gained approximately 5 pounds. He states that he has not been exercising like he used to.      CHRONIC CONDITIONS: Hypertension, hyperlipidemia, coronary artery disease, peripheral arterial disease, and abnormal glucose.    The following portions of the patient's history were reviewed and updated as appropriate: allergies, current medications, past family history, past medical history, past social history, past surgical history and problem list.    Outpatient Medications Prior to Visit   Medication Sig Dispense Refill   • albuterol sulfate HFA (Ventolin HFA) 108 (90 Base) MCG/ACT inhaler Inhale 2 puffs Every 4 (Four) Hours As Needed for Wheezing. 18 g 1   • aspirin 325 MG tablet Take 325 mg by mouth Daily.     • atorvastatin (LIPITOR) 20 MG tablet Take 1 tablet by mouth every night at bedtime. 90 tablet 3   • Budeson-Glycopyrrol-Formoterol (Breztri  Aerosphere) 160-9-4.8 MCG/ACT aerosol inhaler Inhale 2 puffs 2 (Two) Times a Day. 3 each 3   • chlorthalidone (HYGROTON) 25 MG tablet TAKE 1/2 TABLET BY MOUTH EVERY DAY 45 tablet 3   • lisinopril (PRINIVIL,ZESTRIL) 10 MG tablet TAKE 1 TABLET BY MOUTH EVERY DAY 90 tablet 3   • Mepolizumab 100 MG/ML solution prefilled syringe Inject 1 mL under the skin into the appropriate area as directed Every 28 (Twenty-Eight) Days. 1 each 11   • nebivolol (BYSTOLIC) 10 MG tablet TAKE 1 TABLET BY MOUTH EVERY DAY 90 tablet 3   • omeprazole (priLOSEC) 20 MG capsule Take 20 mg by mouth Daily.     • Triamcinolone Acetonide (NASACORT) 55 MCG/ACT nasal inhaler 2 sprays into the nostril(s) as directed by provider 2 (Two) Times a Day.       No facility-administered medications prior to visit.       Patient Active Problem List   Diagnosis   • CAD (coronary artery disease)   • PAD (peripheral artery disease) ([facility])   • Essential hypertension   • Mixed hyperlipidemia   • Peripheral vascular disease ([facility])   • Edema of right lower extremity   • Annual physical exam   • Alcohol use   • Preoperative examination   • Sinus bradycardia, persistent   • Postphlebitic syndrome   • GERD   • Perennial rhinitis   • Non-smoker   • 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) (CMS/[facility])   • Bilateral pulmonary infiltrates   • Screening PSA (prostate specific antigen)   • Medicare annual wellness visit, subsequent   • Chronic persistent asthma   • Abnormal glucose       Advance Care Planning:  ACP discussion was held with the patient during this visit. Patient has an advance directive (not in EMR), copy requested.    Identification of Risk Factors:  Risk factors include: Advance Directive Discussion.    Review of Systems   Constitutional: Negative for chills, fatigue and fever.   HENT: Negative for congestion, ear pain and sinus pressure.    Respiratory: Negative for cough, chest tightness, shortness of breath and wheezing.    Cardiovascular:  Negative for chest pain and palpitations.   Gastrointestinal: Negative for abdominal pain, blood in stool and constipation.   Skin: Negative for color change.   Allergic/Immunologic: Negative for environmental allergies.   Neurological: Negative for dizziness, speech difficulty and headaches.   Psychiatric/Behavioral: Negative for confusion. The patient is not nervous/anxious.        Compared to one year ago, the patient feels his physical health is the same.  Compared to one year ago, the patient feels his mental health is the same.    Objective     Physical Exam  Vitals and nursing note reviewed.   Constitutional:       General: He is not in acute distress.     Appearance: He is well-developed.   HENT:      Head: Normocephalic and atraumatic.      Right Ear: External ear normal.      Left Ear: External ear normal.   Eyes:      Conjunctiva/sclera: Conjunctivae normal.      Pupils: Pupils are equal, round, and reactive to light.   Neck:      Thyroid: No thyromegaly.      Vascular: No JVD.      Trachea: No tracheal deviation.   Cardiovascular:      Rate and Rhythm: Normal rate and regular rhythm.      Heart sounds: Normal heart sounds. No murmur heard.  Pulmonary:      Effort: Pulmonary effort is normal.      Breath sounds: Normal breath sounds.   Abdominal:      General: Bowel sounds are normal. There is no distension.      Palpations: Abdomen is soft. There is no mass.      Tenderness: There is no abdominal tenderness. There is no guarding or rebound.   Musculoskeletal:      Cervical back: Normal range of motion and neck supple.   Lymphadenopathy:      Cervical: No cervical adenopathy.   Skin:     General: Skin is warm and dry.      Capillary Refill: Capillary refill takes less than 2 seconds.   Neurological:      Mental Status: He is alert and oriented to person, place, and time.      Cranial Nerves: No cranial nerve deficit.   Psychiatric:         Behavior: Behavior normal.          Procedures     Vitals:     "11/23/22 1334   BP: 124/62   BP Location: Left arm   Patient Position: Sitting   Cuff Size: Adult   Pulse: 72   Temp: 98.2 °F (36.8 °C)   Weight: 70.9 kg (156 lb 3.2 oz)   Height: 171.5 cm (67.52\")   PainSc: 0-No pain       BMI is within normal parameters. No other follow-up for BMI required.      Assessment & Plan   Problem List Items Addressed This Visit        Cardiac and Vasculature    CAD (coronary artery disease)    Overview     a. Catheterization, March 2012:  70% to 80% mid-LAD with ‘borderline” FFR.  3.5 x 15 Xience.  EF normal.             Relevant Medications    nebivolol (BYSTOLIC) 10 MG tablet    PAD (peripheral artery disease) (HCC)    Overview     b. Asymptomatic abnormal [place].  c. Right 0.7, left NC, April 2012.           Essential hypertension    Overview     Continue lisinopril 10 mg tablets, chlorthalidone 25 mg tablets daily.         Relevant Medications    chlorthalidone (HYGROTON) 25 MG tablet    lisinopril (PRINIVIL,ZESTRIL) 10 MG tablet    nebivolol (BYSTOLIC) 10 MG tablet    Mixed hyperlipidemia    Overview     Continue atorvastatin 20 mg tablets daily.         Relevant Medications    atorvastatin (LIPITOR) 20 MG tablet       Endocrine and Metabolic    Abnormal glucose       Health Encounters    Medicare annual wellness visit, subsequent - Primary     Patient Self-Management and Personalized Health Advice  The patient has been provided with information about: diet and exercise and preventive services including:   · Annual Wellness Visit ([facility]).    Outpatient Encounter Medications as of 11/23/2022   Medication Sig Dispense Refill   • albuterol sulfate HFA (Ventolin HFA) 108 (90 Base) MCG/ACT inhaler Inhale 2 puffs Every 4 (Four) Hours As Needed for Wheezing. 18 g 1   • aspirin 325 MG tablet Take 325 mg by mouth Daily.     • atorvastatin (LIPITOR) 20 MG tablet Take 1 tablet by mouth every night at bedtime. 90 tablet 3   • Budeson-Glycopyrrol-Formoterol (Breztri Aerosphere) 160-9-4.8 MCG/ACT " aerosol inhaler Inhale 2 puffs 2 (Two) Times a Day. 3 each 3   • chlorthalidone (HYGROTON) 25 MG tablet TAKE 1/2 TABLET BY MOUTH EVERY DAY 45 tablet 3   • lisinopril (PRINIVIL,ZESTRIL) 10 MG tablet TAKE 1 TABLET BY MOUTH EVERY DAY 90 tablet 3   • Mepolizumab 100 MG/ML solution prefilled syringe Inject 1 mL under the skin into the appropriate area as directed Every 28 (Twenty-Eight) Days. 1 each 11   • nebivolol (BYSTOLIC) 10 MG tablet TAKE 1 TABLET BY MOUTH EVERY DAY 90 tablet 3   • omeprazole (priLOSEC) 20 MG capsule Take 20 mg by mouth Daily.     • Triamcinolone Acetonide (NASACORT) 55 MCG/ACT nasal inhaler 2 sprays into the nostril(s) as directed by provider 2 (Two) Times a Day.       No facility-administered encounter medications on file as of 11/23/2022.     Assessment and Plan:    1. Prevention  - Despite comorbidities, he is doing quite well. He remains highly functional. He is fully vaccinated against influenza and COVID-19.    2. Hypertension  - Blood pressure is well controlled on lisinopril, chlorthalidone, and Bystolic.    3. Hyperlipidemia  - Lipids are very well controlled on atorvastatin and healthy diet.    4. Coronary artery disease  - Coronary artery disease is asymptomatic on aspirin, atorvastatin, lisinopril, and Bystolic. He will follow up with cardiology.    5. Peripheral arterial disease  - Treatment is the same as coronary artery disease. He is completely asymptomatic.    6. Abnormal glucose  - Hemoglobin A1c is stable at 6.2 percent. The treatment remains healthy diet and avoidance of weight gain.    7. Chronic asthma  - Per pulmonary.    Follow Up:  Follow up in 6 months.    Reviewed use of high risk medication in the elderly: yes  Reviewed for potential of harmful drug interactions in the elderly: yes    Follow Up:  Return in about 6 months (around 5/23/2023) for follow up.     There are no Patient Instructions on file for this visit.    An After Visit Summary and PPPS with all of these  plans were given to the patient.          Transcribed from ambient dictation for Simon Ealsey MD by Teddy Hylton.  11/23/22   14:35 EST    Patient or patient representative verbalized consent to the visit recording.  I have personally performed the services described in this document as transcribed by the above individual, and it is both accurate and complete.

## 2022-12-08 ENCOUNTER — INFUSION (OUTPATIENT)
Dept: ONCOLOGY | Facility: HOSPITAL | Age: 77
End: 2022-12-08

## 2022-12-08 VITALS
TEMPERATURE: 98 F | SYSTOLIC BLOOD PRESSURE: 147 MMHG | RESPIRATION RATE: 16 BRPM | DIASTOLIC BLOOD PRESSURE: 76 MMHG | HEART RATE: 76 BPM

## 2022-12-08 DIAGNOSIS — M30.1 EOSINOPHILIC GRANULOMATOSIS WITH POLYANGIITIS (EGPA): Primary | ICD-10-CM

## 2022-12-08 DIAGNOSIS — D72.18 EOSINOPHILIC GRANULOMATOSIS WITH POLYANGIITIS (EGPA): Primary | ICD-10-CM

## 2022-12-08 PROCEDURE — 83516 IMMUNOASSAY NONANTIBODY: CPT | Performed by: INTERNAL MEDICINE

## 2022-12-08 PROCEDURE — 86037 ANCA TITER EACH ANTIBODY: CPT | Performed by: INTERNAL MEDICINE

## 2022-12-08 PROCEDURE — 25010000002 MEPOLIZUMAB 100 MG RECONSTITUTED SOLUTION: Performed by: NURSE PRACTITIONER

## 2022-12-08 PROCEDURE — 96372 THER/PROPH/DIAG INJ SC/IM: CPT

## 2022-12-08 RX ADMIN — MEPOLIZUMAB 100 MG: 100 INJECTION, POWDER, FOR SOLUTION SUBCUTANEOUS at 09:04

## 2023-01-05 ENCOUNTER — INFUSION (OUTPATIENT)
Dept: ONCOLOGY | Facility: HOSPITAL | Age: 78
End: 2023-01-05
Payer: MEDICARE

## 2023-01-05 VITALS
HEART RATE: 70 BPM | DIASTOLIC BLOOD PRESSURE: 71 MMHG | TEMPERATURE: 97.6 F | SYSTOLIC BLOOD PRESSURE: 134 MMHG | RESPIRATION RATE: 16 BRPM

## 2023-01-05 DIAGNOSIS — M30.1 EOSINOPHILIC GRANULOMATOSIS WITH POLYANGIITIS (EGPA): Primary | ICD-10-CM

## 2023-01-05 DIAGNOSIS — D72.18 EOSINOPHILIC GRANULOMATOSIS WITH POLYANGIITIS (EGPA): Primary | ICD-10-CM

## 2023-01-05 PROCEDURE — 96372 THER/PROPH/DIAG INJ SC/IM: CPT

## 2023-01-05 PROCEDURE — 25010000002 MEPOLIZUMAB 100 MG RECONSTITUTED SOLUTION: Performed by: NURSE PRACTITIONER

## 2023-01-05 RX ADMIN — MEPOLIZUMAB 100 MG: 100 INJECTION, POWDER, FOR SOLUTION SUBCUTANEOUS at 08:59

## 2023-01-18 ENCOUNTER — OFFICE VISIT (OUTPATIENT)
Dept: CARDIOLOGY | Facility: CLINIC | Age: 78
End: 2023-01-18
Payer: MEDICARE

## 2023-01-18 VITALS
WEIGHT: 151.5 LBS | HEART RATE: 67 BPM | DIASTOLIC BLOOD PRESSURE: 68 MMHG | OXYGEN SATURATION: 95 % | SYSTOLIC BLOOD PRESSURE: 110 MMHG | BODY MASS INDEX: 22.96 KG/M2 | HEIGHT: 68 IN

## 2023-01-18 DIAGNOSIS — I25.10 CORONARY ARTERY DISEASE INVOLVING NATIVE CORONARY ARTERY OF NATIVE HEART WITHOUT ANGINA PECTORIS: Primary | ICD-10-CM

## 2023-01-18 DIAGNOSIS — E78.2 MIXED HYPERLIPIDEMIA: ICD-10-CM

## 2023-01-18 DIAGNOSIS — I10 ESSENTIAL HYPERTENSION: ICD-10-CM

## 2023-01-18 DIAGNOSIS — I73.9 PAD (PERIPHERAL ARTERY DISEASE): ICD-10-CM

## 2023-01-18 PROCEDURE — 99213 OFFICE O/P EST LOW 20 MIN: CPT | Performed by: INTERNAL MEDICINE

## 2023-01-18 NOTE — PROGRESS NOTES
Subjective:     Encounter Date:01/18/2023    Primary Care Physician: Simon Easley MD      Patient ID: Uzair Jacob is a 77 y.o. male.    Chief Complaint:Coronary Artery Disease    PROBLEM LIST:  1. Coronary artery disease:  1. Catheterization, March 2012: 70% to 80% mid-LAD with ‘borderline” FFR. 3.5 x 15 Xience. EF normal.   2. 11/6/2019.  Normal stress perfusion and echo  2. Peripheral arterial disease:  1. Asymptomatic abnormal [place].  2. Right 0.7, left NC, April 2012.  3. Hypertension.  4. Hyperlipidemia.  5. DVT, right lower extremity.  1/18.  6. Asthma.  7. Gastroesophageal reflux disease.  8. Tonsillectomy.  9. Sinus polyp extraction.      No Known Allergies      Current Outpatient Medications:   •  albuterol sulfate HFA (Ventolin HFA) 108 (90 Base) MCG/ACT inhaler, Inhale 2 puffs Every 4 (Four) Hours As Needed for Wheezing., Disp: 18 g, Rfl: 1  •  aspirin 325 MG tablet, Take 325 mg by mouth Daily., Disp: , Rfl:   •  atorvastatin (LIPITOR) 20 MG tablet, Take 1 tablet by mouth every night at bedtime., Disp: 90 tablet, Rfl: 3  •  Budeson-Glycopyrrol-Formoterol (Breztri Aerosphere) 160-9-4.8 MCG/ACT aerosol inhaler, Inhale 2 puffs 2 (Two) Times a Day., Disp: 3 each, Rfl: 3  •  chlorthalidone (HYGROTON) 25 MG tablet, TAKE 1/2 TABLET BY MOUTH EVERY DAY, Disp: 45 tablet, Rfl: 3  •  esomeprazole (nexIUM) 20 MG capsule, Take 20 mg by mouth Every Morning Before Breakfast., Disp: , Rfl:   •  lisinopril (PRINIVIL,ZESTRIL) 10 MG tablet, TAKE 1 TABLET BY MOUTH EVERY DAY, Disp: 90 tablet, Rfl: 3  •  Mepolizumab 100 MG/ML solution prefilled syringe, Inject 1 mL under the skin into the appropriate area as directed Every 28 (Twenty-Eight) Days., Disp: 1 each, Rfl: 11  •  nebivolol (BYSTOLIC) 10 MG tablet, TAKE 1 TABLET BY MOUTH EVERY DAY, Disp: 90 tablet, Rfl: 3  •  omeprazole (priLOSEC) 20 MG capsule, Take 20 mg by mouth Daily., Disp: , Rfl:   •  Triamcinolone Acetonide (NASACORT) 55 MCG/ACT nasal inhaler,  "2 sprays into the nostril(s) as directed by provider 2 (Two) Times a Day., Disp: , Rfl:         History of Present Illness    Patient returns today for annual follow-up of coronary disease peripheral arterial disease since her last visit he remains very active.  He has no chest pain shortness of breath or claudication is not been exercising as regularly as in the past due to the recent illness (pancreatic cancer) of his wife.    The following portions of the patient's history were reviewed and updated as appropriate: allergies, current medications, past family history, past medical history, past social history, past surgical history and problem list.      Social History     Tobacco Use   • Smoking status: Never   • Smokeless tobacco: Never   Vaping Use   • Vaping Use: Never used   Substance Use Topics   • Alcohol use: Yes   • Drug use: Never         ROS       Objective:   /68 (BP Location: Left arm, Patient Position: Sitting)   Pulse 67   Ht 172.7 cm (68\")   Wt 68.7 kg (151 lb 8 oz)   SpO2 95%   BMI 23.04 kg/m²         Vitals reviewed.   Constitutional:       Appearance: Well-developed and not in distress.   Neck:      Thyroid: No thyromegaly.      Vascular: No carotid bruit or JVD.   Pulmonary:      Breath sounds: Normal breath sounds.   Cardiovascular:      Regular rhythm.      No gallop. No S3 and S4 gallop.   Abdominal:      General: Bowel sounds are normal.      Palpations: Abdomen is soft. There is no abdominal mass.      Tenderness: There is no abdominal tenderness.   Musculoskeletal:         General: No deformity.      Extremities: No clubbing present.Skin:     General: Skin is warm and dry.      Findings: No rash.   Neurological:      Mental Status: Alert and oriented to person, place, and time.         Procedures          Assessment:   Assessment & Plan      Diagnoses and all orders for this visit:    1. Coronary artery disease involving native coronary artery of native heart without angina " pectoris (Primary)    2. PAD (peripheral artery disease) (HCC)    3. Essential hypertension    4. Mixed hyperlipidemia      1.  Coronary artery disease, stable no angina.  2.  Peripheral chill disease.  Asymptomatic abnormal ABIs.  3.  Hypertension well-controlled on chlorthalidone and lisinopril  4.  Dyslipidemia on high intensity statin no recent lipid check available    Recommendations:  1.  Continue current medical therapy  2.  Revisit annually apparent symptom change       Vic Blas MD      Dictated utilizing Dragon dictation

## 2023-01-25 DIAGNOSIS — J45.909 IDIOPATHIC ASTHMA: ICD-10-CM

## 2023-02-02 ENCOUNTER — INFUSION (OUTPATIENT)
Dept: ONCOLOGY | Facility: HOSPITAL | Age: 78
End: 2023-02-02
Payer: MEDICARE

## 2023-02-02 VITALS
TEMPERATURE: 96.6 F | SYSTOLIC BLOOD PRESSURE: 152 MMHG | HEART RATE: 72 BPM | DIASTOLIC BLOOD PRESSURE: 75 MMHG | RESPIRATION RATE: 18 BRPM

## 2023-02-02 DIAGNOSIS — M30.1 EOSINOPHILIC GRANULOMATOSIS WITH POLYANGIITIS (EGPA): Primary | ICD-10-CM

## 2023-02-02 DIAGNOSIS — D72.18 EOSINOPHILIC GRANULOMATOSIS WITH POLYANGIITIS (EGPA): Primary | ICD-10-CM

## 2023-02-02 PROCEDURE — 96372 THER/PROPH/DIAG INJ SC/IM: CPT

## 2023-02-02 PROCEDURE — 25010000002 MEPOLIZUMAB 100 MG RECONSTITUTED SOLUTION: Performed by: NURSE PRACTITIONER

## 2023-02-02 RX ADMIN — MEPOLIZUMAB 100 MG: 100 INJECTION, POWDER, FOR SOLUTION SUBCUTANEOUS at 09:29

## 2023-02-13 RX ORDER — CHLORTHALIDONE 25 MG/1
TABLET ORAL
Qty: 45 TABLET | Refills: 3 | Status: SHIPPED | OUTPATIENT
Start: 2023-02-13

## 2023-03-02 ENCOUNTER — INFUSION (OUTPATIENT)
Dept: ONCOLOGY | Facility: HOSPITAL | Age: 78
End: 2023-03-02
Payer: MEDICARE

## 2023-03-02 VITALS
RESPIRATION RATE: 16 BRPM | TEMPERATURE: 97.1 F | SYSTOLIC BLOOD PRESSURE: 120 MMHG | HEART RATE: 79 BPM | DIASTOLIC BLOOD PRESSURE: 86 MMHG

## 2023-03-02 DIAGNOSIS — M30.1 EOSINOPHILIC GRANULOMATOSIS WITH POLYANGIITIS (EGPA): Primary | ICD-10-CM

## 2023-03-02 DIAGNOSIS — D72.18 EOSINOPHILIC GRANULOMATOSIS WITH POLYANGIITIS (EGPA): Primary | ICD-10-CM

## 2023-03-02 PROCEDURE — 25010000002 MEPOLIZUMAB 100 MG RECONSTITUTED SOLUTION: Performed by: NURSE PRACTITIONER

## 2023-03-02 PROCEDURE — 96372 THER/PROPH/DIAG INJ SC/IM: CPT

## 2023-03-02 RX ADMIN — MEPOLIZUMAB 100 MG: 100 INJECTION, POWDER, FOR SOLUTION SUBCUTANEOUS at 09:20

## 2023-03-30 ENCOUNTER — INFUSION (OUTPATIENT)
Dept: ONCOLOGY | Facility: HOSPITAL | Age: 78
End: 2023-03-30
Payer: MEDICARE

## 2023-03-30 VITALS
DIASTOLIC BLOOD PRESSURE: 63 MMHG | SYSTOLIC BLOOD PRESSURE: 144 MMHG | HEART RATE: 60 BPM | RESPIRATION RATE: 18 BRPM | TEMPERATURE: 97.6 F

## 2023-03-30 DIAGNOSIS — M30.1 EOSINOPHILIC GRANULOMATOSIS WITH POLYANGIITIS (EGPA): Primary | ICD-10-CM

## 2023-03-30 DIAGNOSIS — D72.18 EOSINOPHILIC GRANULOMATOSIS WITH POLYANGIITIS (EGPA): Primary | ICD-10-CM

## 2023-03-30 PROCEDURE — 96372 THER/PROPH/DIAG INJ SC/IM: CPT

## 2023-03-30 PROCEDURE — 25010000002 MEPOLIZUMAB 100 MG RECONSTITUTED SOLUTION: Performed by: NURSE PRACTITIONER

## 2023-03-30 RX ADMIN — MEPOLIZUMAB 100 MG: 100 INJECTION, POWDER, FOR SOLUTION SUBCUTANEOUS at 09:07

## 2023-04-27 ENCOUNTER — INFUSION (OUTPATIENT)
Dept: ONCOLOGY | Facility: HOSPITAL | Age: 78
End: 2023-04-27
Payer: MEDICARE

## 2023-04-27 VITALS
RESPIRATION RATE: 18 BRPM | TEMPERATURE: 97.1 F | SYSTOLIC BLOOD PRESSURE: 135 MMHG | HEART RATE: 68 BPM | DIASTOLIC BLOOD PRESSURE: 77 MMHG

## 2023-04-27 DIAGNOSIS — D72.18 EOSINOPHILIC GRANULOMATOSIS WITH POLYANGIITIS (EGPA): Primary | ICD-10-CM

## 2023-04-27 DIAGNOSIS — M30.1 EOSINOPHILIC GRANULOMATOSIS WITH POLYANGIITIS (EGPA): Primary | ICD-10-CM

## 2023-04-27 PROCEDURE — 25010000002 MEPOLIZUMAB 100 MG RECONSTITUTED SOLUTION: Performed by: NURSE PRACTITIONER

## 2023-04-27 PROCEDURE — 96372 THER/PROPH/DIAG INJ SC/IM: CPT

## 2023-04-27 RX ADMIN — MEPOLIZUMAB 100 MG: 100 INJECTION, POWDER, FOR SOLUTION SUBCUTANEOUS at 09:38

## 2023-05-08 RX ORDER — ATORVASTATIN CALCIUM 20 MG/1
TABLET, FILM COATED ORAL
Qty: 90 TABLET | Refills: 3 | Status: SHIPPED | OUTPATIENT
Start: 2023-05-08

## 2023-05-19 ENCOUNTER — OFFICE VISIT (OUTPATIENT)
Dept: PULMONOLOGY | Facility: CLINIC | Age: 78
End: 2023-05-19
Payer: MEDICARE

## 2023-05-19 VITALS
HEIGHT: 68 IN | OXYGEN SATURATION: 94 % | WEIGHT: 172.7 LBS | TEMPERATURE: 96.9 F | DIASTOLIC BLOOD PRESSURE: 78 MMHG | SYSTOLIC BLOOD PRESSURE: 118 MMHG | BODY MASS INDEX: 26.18 KG/M2 | HEART RATE: 71 BPM

## 2023-05-19 DIAGNOSIS — M30.1 EOSINOPHILIC GRANULOMATOSIS WITH POLYANGIITIS (EGPA): Primary | ICD-10-CM

## 2023-05-19 DIAGNOSIS — D72.18 EOSINOPHILIC GRANULOMATOSIS WITH POLYANGIITIS (EGPA): Primary | ICD-10-CM

## 2023-05-19 DIAGNOSIS — K21.9 CHRONIC GERD: ICD-10-CM

## 2023-05-19 DIAGNOSIS — J45.909 IDIOPATHIC ASTHMA: ICD-10-CM

## 2023-05-19 RX ORDER — IPRATROPIUM BROMIDE 21 UG/1
2 SPRAY, METERED NASAL EVERY 12 HOURS
Qty: 1 EACH | Refills: 12 | Status: SHIPPED | OUTPATIENT
Start: 2023-05-19

## 2023-05-19 RX ORDER — CEFUROXIME AXETIL 500 MG/1
500 TABLET ORAL 2 TIMES DAILY
Qty: 20 TABLET | Refills: 0 | Status: SHIPPED | OUTPATIENT
Start: 2023-05-19

## 2023-05-19 NOTE — PROGRESS NOTES
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  He has had significant improvement in symptoms and resolution of previously noted pulmonary infiltrates although persistent airway obstruction on spirometry    No acute exacerbations since I last saw him    For the last week or so has had sinus congestion, drainage, postnasal drip and green nasal drainage  He is using saline rinses, and an antihistamine, and Nasacort    Patient Active Problem List   Diagnosis   • CAD (coronary artery disease)   • PAD (peripheral artery disease)   • Essential hypertension   • Mixed hyperlipidemia   • Peripheral vascular disease   • Edema of right lower extremity   • Annual physical exam   • Alcohol use   • Preoperative examination   • Sinus bradycardia, persistent   • Postphlebitic syndrome   • GERD   • Perennial rhinitis   • Non-smoker   • 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) (CMS/[facility])   • Bilateral pulmonary infiltrates   • Screening PSA (prostate specific antigen)   • Medicare annual wellness visit, subsequent   • Chronic persistent asthma   • Abnormal glucose     No Known Allergies    Current Outpatient Medications:   •  albuterol sulfate HFA (Ventolin HFA) 108 (90 Base) MCG/ACT inhaler, Inhale 2 puffs Every 4 (Four) Hours As Needed for Wheezing., Disp: 18 g, Rfl: 1  •  aspirin 325 MG tablet, Take 1 tablet by mouth Daily., Disp: , Rfl:   •  atorvastatin (LIPITOR) 20 MG tablet, TAKE 1 TABLET BY MOUTH EVERYDAY AT BEDTIME, Disp: 90 tablet, Rfl: 3  •  Budeson-Glycopyrrol-Formoterol (Breztri Aerosphere)  "160-9-4.8 MCG/ACT aerosol inhaler, Inhale 2 puffs 2 (Two) Times a Day., Disp: 3 each, Rfl: 3  •  chlorthalidone (HYGROTON) 25 MG tablet, TAKE 1/2 TABLET BY MOUTH EVERY DAY, Disp: 45 tablet, Rfl: 3  •  esomeprazole (nexIUM) 20 MG capsule, Take 1 capsule by mouth Every Morning Before Breakfast., Disp: , Rfl:   •  lisinopril (PRINIVIL,ZESTRIL) 10 MG tablet, TAKE 1 TABLET BY MOUTH EVERY DAY, Disp: 90 tablet, Rfl: 3  •  Mepolizumab 100 MG/ML solution prefilled syringe, Inject 1 mL under the skin into the appropriate area as directed Every 28 (Twenty-Eight) Days., Disp: 1 each, Rfl: 11  •  nebivolol (BYSTOLIC) 10 MG tablet, TAKE 1 TABLET BY MOUTH EVERY DAY, Disp: 90 tablet, Rfl: 3  •  omeprazole (priLOSEC) 20 MG capsule, Take 1 capsule by mouth Daily., Disp: , Rfl:   •  Triamcinolone Acetonide (NASACORT) 55 MCG/ACT nasal inhaler, 2 sprays into the nostril(s) as directed by provider 2 (Two) Times a Day., Disp: , Rfl:   MEDICATION LIST AND ALLERGIES REVIEWED.    Family History   Problem Relation Age of Onset   • Esophageal cancer Father         cause of death   • Cancer Father    • Asthma Sister    • Asthma Brother      Social History     Tobacco Use   • Smoking status: Never   • Smokeless tobacco: Never   Vaping Use   • Vaping Use: Never used   Substance Use Topics   • Alcohol use: Yes   • Drug use: Never     Social History     Social History Narrative        Retired     Lifelong non-smoker    Drinks about 1 alcoholic beverage on a weekly basis     FAMILY AND SOCIAL HISTORY REVIEWED.    Review of Systems  IF PRESENT REFER TO SCANNED ROS SHEET FROM SAME DATE  OTHERWISE ROS OBTAINED AND NON-CONTRIBUTORY OVER HPI.    /78   Pulse 71   Temp 96.9 °F (36.1 °C) (Temporal)   Ht 172.7 cm (67.99\")   Wt 78.3 kg (172 lb 11.2 oz)   SpO2 94% Comment: resting at room air  BMI 26.27 kg/m²   Physical Exam  Vitals and nursing note reviewed.   Constitutional:       General: He is not in acute " distress.     Appearance: He is well-developed. He is not diaphoretic.   HENT:      Head: Normocephalic and atraumatic.   Neck:      Thyroid: No thyromegaly.   Cardiovascular:      Rate and Rhythm: Normal rate and regular rhythm.      Heart sounds: Normal heart sounds. No murmur heard.  Pulmonary:      Effort: Pulmonary effort is normal.      Breath sounds: No stridor.      Comments: A few wheezes  Lymphadenopathy:      Cervical: No cervical adenopathy.      Upper Body:      Right upper body: No supraclavicular or epitrochlear adenopathy.      Left upper body: No supraclavicular or epitrochlear adenopathy.   Skin:     General: Skin is warm and dry.   Neurological:      Mental Status: He is alert and oriented to person, place, and time.   Psychiatric:         Behavior: Behavior normal.         Results     Immunization History   Administered Date(s) Administered   • COVID-19 (PFIZER) BIVALENT 12+YRS 11/05/2022   • COVID-19 (PFIZER) Purple Cap Monovalent 01/11/2021, 02/01/2021, 10/02/2021   • Covid-19 (Pfizer) Gray Cap Monovalent 06/11/2022   • FLUAD TRI 65YR+ 10/21/2019, 09/30/2020   • Fluad Quad 65+ 10/11/2021   • Fluzone High Dose =>65 Years ([employer] ONLY) 10/01/2014, 12/14/2016, 10/10/2017, 11/30/2017, 10/01/2018, 10/21/2019, 09/30/2020   • Fluzone High-Dose 65+yrs 09/30/2020, 11/05/2022   • Fluzone Quad >6mos (Multi-dose) 10/30/2015   • Hepatitis A 11/06/2018, 04/06/2019, 05/03/2019   • Pneumococcal Conjugate 13-Valent (PCV13) 12/17/2014   • Pneumococcal Polysaccharide (PPSV23) 06/25/2013, 11/16/2021   • Pneumococcal, Unspecified 06/25/2013   • Shingrix 03/18/2020, 05/01/2020, 06/10/2020   • Tdap 06/25/2013   • Zostavax 06/20/2012     Problem List       ICD-10-CM ICD-9-CM   1. 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 GERD  K21.9 530.81   3. Chronic persistent asthma  J45.909 493.90       Discussion     Stable from pulmonary standpoint  He does have symptoms  suggestive of acute sinusitis  I am going to add Atrovent nasal spray to his antihistamine, nasal steroid, and saline rinses  If no improvement then I sent in a prescription for Ceftin    He is can to remain on same medical regimen for his EGPA    I will plan to see him back in 6 months or earlier if there are any problems in the meantime    Moderate level of Medical Decision Making complexity based on 2 or more chronic stable illnesses and an independent review of test results and/or prescription drug management.    Jaspreet Quiroz MD  Note electronically signed    CC: Simon Easley MD

## 2023-05-24 ENCOUNTER — OFFICE VISIT (OUTPATIENT)
Dept: INTERNAL MEDICINE | Facility: CLINIC | Age: 78
End: 2023-05-24
Payer: MEDICARE

## 2023-05-24 ENCOUNTER — LAB (OUTPATIENT)
Dept: LAB | Facility: HOSPITAL | Age: 78
End: 2023-05-24
Payer: MEDICARE

## 2023-05-24 VITALS
BODY MASS INDEX: 23.61 KG/M2 | SYSTOLIC BLOOD PRESSURE: 132 MMHG | HEIGHT: 68 IN | HEART RATE: 68 BPM | TEMPERATURE: 98.4 F | WEIGHT: 155.8 LBS | DIASTOLIC BLOOD PRESSURE: 76 MMHG

## 2023-05-24 DIAGNOSIS — Z12.5 PROSTATE CANCER SCREENING: ICD-10-CM

## 2023-05-24 DIAGNOSIS — E78.2 MIXED HYPERLIPIDEMIA: ICD-10-CM

## 2023-05-24 DIAGNOSIS — R73.09 ABNORMAL GLUCOSE: ICD-10-CM

## 2023-05-24 DIAGNOSIS — I10 ESSENTIAL HYPERTENSION: Primary | ICD-10-CM

## 2023-05-24 PROCEDURE — 1160F RVW MEDS BY RX/DR IN RCRD: CPT | Performed by: INTERNAL MEDICINE

## 2023-05-24 PROCEDURE — 3075F SYST BP GE 130 - 139MM HG: CPT | Performed by: INTERNAL MEDICINE

## 2023-05-24 PROCEDURE — 99214 OFFICE O/P EST MOD 30 MIN: CPT | Performed by: INTERNAL MEDICINE

## 2023-05-24 PROCEDURE — 3078F DIAST BP <80 MM HG: CPT | Performed by: INTERNAL MEDICINE

## 2023-05-24 PROCEDURE — 1159F MED LIST DOCD IN RCRD: CPT | Performed by: INTERNAL MEDICINE

## 2023-05-24 NOTE — PROGRESS NOTES
[facility]     Uzair Jacob  1945   [id number]      Patient Care Team:  Simon Easley MD as PCP - General  Vic Blas MD as Consulting Physician (Cardiology)  Sukhdev Sun MD (Dermatology)  Jaspreet Quiroz MD as Emergency Attending (Pulmonary Disease)  Joey Elizalde MD as Consulting Physician (Ophthalmology)  Jaspreet Quiroz MD as Emergency Attending (Pulmonary Disease)    Chief Complaint::   Chief Complaint   Patient presents with   • Hypertension   • Hyperlipidemia        HPI    The patient comes in for follow-up of hypertension, hyperlipidemia, and prediabetes. He continues to see cardiology for coronary artery disease and pulmonary for eosinophilic granulomatosis.    Eosinophilic granulomatosis  The patient reports that he is doing well and his lungs are good at this point in time.     Health maintenance  He explains that his wife passed away on 04/22/2023 from pancreatic cancer. He notes that she had no risk factors which made it hard to accept. He notes that he has not been sleeping as well and is waking up most nights  however, he feels that it will resolve in time. He had lost some weight during the end stages of her illness and he has gained 5 pounds recently noting being diligent about his eating. He does not feel isolated and has a good group of neighbors who have been bringing him food.       Chronic Conditions: Hypertension, hyperlipidemia, prediabetes, coronary artery disease, and eosinophilic granulomatosis.    Patient Active Problem List   Diagnosis   • CAD (coronary artery disease)   • PAD (peripheral artery disease)   • Essential hypertension   • Mixed hyperlipidemia   • Peripheral vascular disease   • Edema of right lower extremity   • Annual physical exam   • Alcohol use   • Preoperative examination   • Sinus bradycardia, persistent   • Postphlebitic syndrome   • GERD   • Perennial rhinitis   • Non-smoker   • R/O  Eosinophilic granulomatosis with polyangiitis (EGPA) (CMS/HCC)   • Bilateral pulmonary infiltrates   • Screening PSA (prostate specific antigen)   • Medicare annual wellness visit, subsequent   • Chronic persistent asthma   • Abnormal glucose        Past Medical History:   Diagnosis Date   • Allergic    • Allergic rhinitis    • Asthma 03/08/2017   • CAD (coronary artery disease) 03/08/2017   • Chest pain     stent to LAD 2012   • COPD (chronic obstructive pulmonary disease)    • Deep vein thrombosis (DVT) of right lower extremity 04/18/2019   • GERD (gastroesophageal reflux disease) 03/08/2017   • Hyperlipidemia 03/08/2017   • Hypertension 03/08/2017   • Nasal polyps     nasal polypectomy 1996   • PAD (peripheral artery disease) 03/08/2017   • Pneumonia 1950       Past Surgical History:   Procedure Laterality Date   • CARDIAC CATHETERIZATION  01/01/2018   • CORONARY ANGIOPLASTY WITH STENT PLACEMENT  03/2012    Chest pain; stent to LAD   • NASAL POLYP SURGERY  1996    Nasal polyps   • POLYPECTOMY      Sinus polyp extraction.   • TONSILLECTOMY         Family History   Problem Relation Age of Onset   • Esophageal cancer Father         cause of death   • Cancer Father    • Asthma Sister    • Asthma Brother        Social History     Socioeconomic History   • Marital status:    Tobacco Use   • Smoking status: Never   • Smokeless tobacco: Never   Vaping Use   • Vaping Use: Never used   Substance and Sexual Activity   • Alcohol use: Yes   • Drug use: Never   • Sexual activity: Not Currently     Partners: Female     Birth control/protection: Tubal ligation       No Known Allergies      Current Outpatient Medications:   •  albuterol sulfate HFA (Ventolin HFA) 108 (90 Base) MCG/ACT inhaler, Inhale 2 puffs Every 4 (Four) Hours As Needed for Wheezing., Disp: 18 g, Rfl: 1  •  aspirin 325 MG tablet, Take 1 tablet by mouth Daily., Disp: , Rfl:   •  atorvastatin (LIPITOR) 20 MG tablet, TAKE 1 TABLET BY MOUTH EVERYDAY AT  BEDTIME, Disp: 90 tablet, Rfl: 3  •  Budeson-Glycopyrrol-Formoterol (Breztri Aerosphere) 160-9-4.8 MCG/ACT aerosol inhaler, Inhale 2 puffs 2 (Two) Times a Day., Disp: 3 each, Rfl: 3  •  cefuroxime (CEFTIN) 500 MG tablet, Take 1 tablet by mouth 2 (Two) Times a Day., Disp: 20 tablet, Rfl: 0  •  chlorthalidone (HYGROTON) 25 MG tablet, TAKE 1/2 TABLET BY MOUTH EVERY DAY, Disp: 45 tablet, Rfl: 3  •  esomeprazole (nexIUM) 20 MG capsule, Take 1 capsule by mouth Every Morning Before Breakfast., Disp: , Rfl:   •  ipratropium (ATROVENT) 0.03 % nasal spray, 2 sprays into the nostril(s) as directed by provider Every 12 (Twelve) Hours., Disp: 1 each, Rfl: 12  •  lisinopril (PRINIVIL,ZESTRIL) 10 MG tablet, TAKE 1 TABLET BY MOUTH EVERY DAY, Disp: 90 tablet, Rfl: 3  •  Mepolizumab 100 MG/ML solution prefilled syringe, Inject 1 mL under the skin into the appropriate area as directed Every 28 (Twenty-Eight) Days., Disp: 1 each, Rfl: 11  •  nebivolol (BYSTOLIC) 10 MG tablet, TAKE 1 TABLET BY MOUTH EVERY DAY, Disp: 90 tablet, Rfl: 3  •  omeprazole (priLOSEC) 20 MG capsule, Take 1 capsule by mouth Daily., Disp: , Rfl:   •  Triamcinolone Acetonide (NASACORT) 55 MCG/ACT nasal inhaler, 2 sprays into the nostril(s) as directed by provider 2 (Two) Times a Day., Disp: , Rfl:     Review of Systems   Constitutional: Negative for chills, fatigue and fever.   HENT: Negative for congestion, ear pain and sinus pressure.    Respiratory: Negative for cough, chest tightness, shortness of breath and wheezing.    Cardiovascular: Negative for chest pain and palpitations.   Gastrointestinal: Negative for abdominal pain, blood in stool and constipation.   Skin: Negative for color change.   Allergic/Immunologic: Negative for environmental allergies.   Neurological: Negative for dizziness, speech difficulty and headache.   Psychiatric/Behavioral: Positive for sleep disturbance. Negative for decreased concentration. The patient is not nervous/anxious.      "    Vital Signs  Vitals:    05/24/23 1126   Height: 172.7 cm (67.99\")       Physical Exam  Vitals and nursing note reviewed.   Constitutional:       General: He is not in acute distress.     Appearance: He is well-developed.   HENT:      Head: Normocephalic and atraumatic.      Right Ear: External ear normal.      Left Ear: External ear normal.   Eyes:      Conjunctiva/sclera: Conjunctivae normal.      Pupils: Pupils are equal, round, and reactive to light.   Neck:      Thyroid: No thyromegaly.      Vascular: No JVD.      Trachea: No tracheal deviation.   Cardiovascular:      Rate and Rhythm: Normal rate and regular rhythm.      Heart sounds: Normal heart sounds. No murmur heard.  Pulmonary:      Effort: Pulmonary effort is normal.      Breath sounds: Normal breath sounds.   Abdominal:      General: Bowel sounds are normal. There is no distension.      Palpations: Abdomen is soft. There is no mass.      Tenderness: There is no abdominal tenderness. There is no guarding or rebound.   Musculoskeletal:      Cervical back: Normal range of motion and neck supple.   Lymphadenopathy:      Cervical: No cervical adenopathy.   Skin:     General: Skin is warm and dry.      Capillary Refill: Capillary refill takes less than 2 seconds.   Neurological:      Mental Status: He is alert and oriented to person, place, and time.      Cranial Nerves: No cranial nerve deficit.   Psychiatric:         Behavior: Behavior normal.          Procedures    ACE III MINI             Assessment/Plan:    Diagnoses and all orders for this visit:    1. Essential hypertension (Primary)  -     CBC & Differential; Future  -     Microalbumin / Creatinine Urine Ratio - Urine, Clean Catch; Future    2. Mixed hyperlipidemia  -     Comprehensive Metabolic Panel; Future  -     Lipid Panel; Future  -     Lipid Panel  -     Comprehensive Metabolic Panel  -     Comprehensive Metabolic Panel; Future  -     Lipid Panel; Future    3. Abnormal glucose  -     " Hemoglobin A1c; Future  -     Hemoglobin A1c  -     Hemoglobin A1c; Future    4. Prostate cancer screening  -     PSA Screen; Future      Plan:    1. Hypertension  - Blood pressure is well controlled on chlorthalidone, lisinopril, and nebivolol.    2. Hyperlipidemia  - Lipid panel is pending. He will continue atorvastatin and healthy diet.    3. Abnormal glucose  - Hemoglobin A1c is pending. The treatment remains carbohydrate restriction and avoidance of weight gain.    Follow up in 6 months.    Plan of care reviewed with patient at the conclusion of today's visit. Education was provided regarding diagnosis, management, and any prescribed or recommended OTC medications.Patient verbalizes understanding of and agreement with management plan.         Haritha Mejia MA     Transcribed from ambient dictation for Simon Easley MD by Jacqueline Gomes.  05/24/23   15:18 EDT    Patient or patient representative verbalized consent to the visit recording.  I have personally performed the services described in this document as transcribed by the above individual, and it is both accurate and complete.

## 2023-05-25 ENCOUNTER — INFUSION (OUTPATIENT)
Dept: ONCOLOGY | Facility: HOSPITAL | Age: 78
End: 2023-05-25
Payer: MEDICARE

## 2023-05-25 VITALS
HEART RATE: 83 BPM | DIASTOLIC BLOOD PRESSURE: 79 MMHG | SYSTOLIC BLOOD PRESSURE: 134 MMHG | TEMPERATURE: 98.2 F | RESPIRATION RATE: 16 BRPM

## 2023-05-25 DIAGNOSIS — M30.1 EOSINOPHILIC GRANULOMATOSIS WITH POLYANGIITIS (EGPA): Primary | ICD-10-CM

## 2023-05-25 DIAGNOSIS — D72.18 EOSINOPHILIC GRANULOMATOSIS WITH POLYANGIITIS (EGPA): Primary | ICD-10-CM

## 2023-05-25 LAB
ALBUMIN SERPL-MCNC: 3.7 G/DL (ref 3.5–5.2)
ALBUMIN/GLOB SERPL: 1.5 G/DL
ALP SERPL-CCNC: 65 U/L (ref 39–117)
ALT SERPL-CCNC: 16 U/L (ref 1–41)
AST SERPL-CCNC: 17 U/L (ref 1–40)
BILIRUB SERPL-MCNC: 1.1 MG/DL (ref 0–1.2)
BUN SERPL-MCNC: 16 MG/DL (ref 8–23)
BUN/CREAT SERPL: 14.5 (ref 7–25)
CALCIUM SERPL-MCNC: 9 MG/DL (ref 8.6–10.5)
CHLORIDE SERPL-SCNC: 102 MMOL/L (ref 98–107)
CHOLEST SERPL-MCNC: 147 MG/DL (ref 0–200)
CO2 SERPL-SCNC: 31.1 MMOL/L (ref 22–29)
CREAT SERPL-MCNC: 1.1 MG/DL (ref 0.76–1.27)
EGFRCR SERPLBLD CKD-EPI 2021: 69.1 ML/MIN/1.73
GLOBULIN SER CALC-MCNC: 2.4 GM/DL
GLUCOSE SERPL-MCNC: 102 MG/DL (ref 65–99)
HBA1C MFR BLD: 6.2 % (ref 4.8–5.6)
HDLC SERPL-MCNC: 66 MG/DL (ref 40–60)
LDLC SERPL CALC-MCNC: 70 MG/DL (ref 0–100)
POTASSIUM SERPL-SCNC: 3.7 MMOL/L (ref 3.5–5.2)
PROT SERPL-MCNC: 6.1 G/DL (ref 6–8.5)
SODIUM SERPL-SCNC: 144 MMOL/L (ref 136–145)
TRIGL SERPL-MCNC: 50 MG/DL (ref 0–150)
VLDLC SERPL CALC-MCNC: 11 MG/DL (ref 5–40)

## 2023-05-25 PROCEDURE — 96372 THER/PROPH/DIAG INJ SC/IM: CPT

## 2023-05-25 PROCEDURE — 25010000002 MEPOLIZUMAB 100 MG RECONSTITUTED SOLUTION: Performed by: NURSE PRACTITIONER

## 2023-05-25 RX ADMIN — MEPOLIZUMAB 100 MG: 100 INJECTION, POWDER, FOR SOLUTION SUBCUTANEOUS at 09:27

## 2023-08-17 ENCOUNTER — INFUSION (OUTPATIENT)
Dept: ONCOLOGY | Facility: HOSPITAL | Age: 78
End: 2023-08-17
Payer: MEDICARE

## 2023-08-17 VITALS
HEIGHT: 68 IN | RESPIRATION RATE: 16 BRPM | DIASTOLIC BLOOD PRESSURE: 64 MMHG | TEMPERATURE: 98.3 F | HEART RATE: 68 BPM | BODY MASS INDEX: 23.69 KG/M2 | SYSTOLIC BLOOD PRESSURE: 134 MMHG

## 2023-08-17 DIAGNOSIS — D72.18 EOSINOPHILIC GRANULOMATOSIS WITH POLYANGIITIS (EGPA): Primary | ICD-10-CM

## 2023-08-17 DIAGNOSIS — M30.1 EOSINOPHILIC GRANULOMATOSIS WITH POLYANGIITIS (EGPA): Primary | ICD-10-CM

## 2023-08-17 PROCEDURE — 96372 THER/PROPH/DIAG INJ SC/IM: CPT

## 2023-08-17 PROCEDURE — 25010000002 MEPOLIZUMAB 100 MG RECONSTITUTED SOLUTION: Performed by: NURSE PRACTITIONER

## 2023-08-17 RX ADMIN — MEPOLIZUMAB 100 MG: 100 INJECTION, POWDER, FOR SOLUTION SUBCUTANEOUS at 08:50

## 2023-09-11 RX ORDER — LISINOPRIL 10 MG/1
TABLET ORAL
Qty: 90 TABLET | Refills: 3 | Status: SHIPPED | OUTPATIENT
Start: 2023-09-11

## 2023-09-14 ENCOUNTER — INFUSION (OUTPATIENT)
Dept: ONCOLOGY | Facility: HOSPITAL | Age: 78
End: 2023-09-14
Payer: MEDICARE

## 2023-09-14 VITALS
DIASTOLIC BLOOD PRESSURE: 71 MMHG | HEART RATE: 65 BPM | RESPIRATION RATE: 16 BRPM | TEMPERATURE: 96.6 F | SYSTOLIC BLOOD PRESSURE: 155 MMHG

## 2023-09-14 DIAGNOSIS — M30.1 EOSINOPHILIC GRANULOMATOSIS WITH POLYANGIITIS (EGPA): Primary | ICD-10-CM

## 2023-09-14 DIAGNOSIS — D72.18 EOSINOPHILIC GRANULOMATOSIS WITH POLYANGIITIS (EGPA): Primary | ICD-10-CM

## 2023-09-14 PROCEDURE — 96372 THER/PROPH/DIAG INJ SC/IM: CPT

## 2023-09-14 PROCEDURE — 25010000002 MEPOLIZUMAB 100 MG RECONSTITUTED SOLUTION: Performed by: NURSE PRACTITIONER

## 2023-09-14 RX ADMIN — MEPOLIZUMAB 100 MG: 100 INJECTION, POWDER, FOR SOLUTION SUBCUTANEOUS at 08:55

## 2023-10-12 ENCOUNTER — INFUSION (OUTPATIENT)
Dept: ONCOLOGY | Facility: HOSPITAL | Age: 78
End: 2023-10-12
Payer: MEDICARE

## 2023-10-12 VITALS
HEART RATE: 82 BPM | TEMPERATURE: 98.5 F | DIASTOLIC BLOOD PRESSURE: 97 MMHG | RESPIRATION RATE: 16 BRPM | SYSTOLIC BLOOD PRESSURE: 136 MMHG

## 2023-10-12 DIAGNOSIS — M30.1 EOSINOPHILIC GRANULOMATOSIS WITH POLYANGIITIS (EGPA): Primary | ICD-10-CM

## 2023-10-12 DIAGNOSIS — D72.18 EOSINOPHILIC GRANULOMATOSIS WITH POLYANGIITIS (EGPA): Primary | ICD-10-CM

## 2023-10-12 PROCEDURE — 25010000002 MEPOLIZUMAB 100 MG RECONSTITUTED SOLUTION: Performed by: NURSE PRACTITIONER

## 2023-10-12 PROCEDURE — 96372 THER/PROPH/DIAG INJ SC/IM: CPT

## 2023-10-12 RX ADMIN — MEPOLIZUMAB 100 MG: 100 INJECTION, POWDER, FOR SOLUTION SUBCUTANEOUS at 09:15

## 2023-10-27 RX ORDER — NEBIVOLOL 10 MG/1
TABLET ORAL
Qty: 90 TABLET | Refills: 3 | Status: SHIPPED | OUTPATIENT
Start: 2023-10-27

## 2023-11-08 RX ORDER — BUDESONIDE, GLYCOPYRROLATE, AND FORMOTEROL FUMARATE 160; 9; 4.8 UG/1; UG/1; UG/1
2 AEROSOL, METERED RESPIRATORY (INHALATION) 2 TIMES DAILY
Qty: 3 EACH | Refills: 3 | Status: SHIPPED | OUTPATIENT
Start: 2023-11-08

## 2023-11-08 NOTE — TELEPHONE ENCOUNTER
Patient called today requesting refills on Breztri, refills have been sent. Pt notified and verbalized good understanding and appreciation

## 2023-11-09 ENCOUNTER — INFUSION (OUTPATIENT)
Dept: ONCOLOGY | Facility: HOSPITAL | Age: 78
End: 2023-11-09
Payer: MEDICARE

## 2023-11-09 VITALS
DIASTOLIC BLOOD PRESSURE: 88 MMHG | RESPIRATION RATE: 16 BRPM | TEMPERATURE: 97.2 F | SYSTOLIC BLOOD PRESSURE: 132 MMHG | HEART RATE: 73 BPM

## 2023-11-09 DIAGNOSIS — D72.18 EOSINOPHILIC GRANULOMATOSIS WITH POLYANGIITIS (EGPA): Primary | ICD-10-CM

## 2023-11-09 DIAGNOSIS — M30.1 EOSINOPHILIC GRANULOMATOSIS WITH POLYANGIITIS (EGPA): Primary | ICD-10-CM

## 2023-11-09 PROCEDURE — 96372 THER/PROPH/DIAG INJ SC/IM: CPT

## 2023-11-09 PROCEDURE — 25010000002 MEPOLIZUMAB 100 MG RECONSTITUTED SOLUTION: Performed by: NURSE PRACTITIONER

## 2023-11-09 RX ADMIN — MEPOLIZUMAB 100 MG: 100 INJECTION, POWDER, FOR SOLUTION SUBCUTANEOUS at 08:46

## 2023-11-15 ENCOUNTER — LAB (OUTPATIENT)
Dept: LAB | Facility: HOSPITAL | Age: 78
End: 2023-11-15
Payer: MEDICARE

## 2023-11-15 DIAGNOSIS — Z12.5 PROSTATE CANCER SCREENING: ICD-10-CM

## 2023-11-15 DIAGNOSIS — R73.09 ABNORMAL GLUCOSE: ICD-10-CM

## 2023-11-15 DIAGNOSIS — I10 ESSENTIAL HYPERTENSION: ICD-10-CM

## 2023-11-15 DIAGNOSIS — E78.2 MIXED HYPERLIPIDEMIA: ICD-10-CM

## 2023-11-15 LAB
ALBUMIN SERPL-MCNC: 3.7 G/DL (ref 3.5–5.2)
ALBUMIN UR-MCNC: 1.8 MG/DL
ALBUMIN/GLOB SERPL: 1.3 G/DL
ALP SERPL-CCNC: 63 U/L (ref 39–117)
ALT SERPL W P-5'-P-CCNC: 12 U/L (ref 1–41)
ANION GAP SERPL CALCULATED.3IONS-SCNC: 8 MMOL/L (ref 5–15)
AST SERPL-CCNC: 20 U/L (ref 1–40)
BASOPHILS # BLD AUTO: 0.04 10*3/MM3 (ref 0–0.2)
BASOPHILS NFR BLD AUTO: 0.5 % (ref 0–1.5)
BILIRUB SERPL-MCNC: 1.3 MG/DL (ref 0–1.2)
BUN SERPL-MCNC: 15 MG/DL (ref 8–23)
BUN/CREAT SERPL: 11.9 (ref 7–25)
CALCIUM SPEC-SCNC: 9.2 MG/DL (ref 8.6–10.5)
CHLORIDE SERPL-SCNC: 104 MMOL/L (ref 98–107)
CHOLEST SERPL-MCNC: 144 MG/DL (ref 0–200)
CO2 SERPL-SCNC: 32 MMOL/L (ref 22–29)
CREAT SERPL-MCNC: 1.26 MG/DL (ref 0.76–1.27)
CREAT UR-MCNC: 156.1 MG/DL
DEPRECATED RDW RBC AUTO: 45.7 FL (ref 37–54)
EGFRCR SERPLBLD CKD-EPI 2021: 58.4 ML/MIN/1.73
EOSINOPHIL # BLD AUTO: 0.03 10*3/MM3 (ref 0–0.4)
EOSINOPHIL NFR BLD AUTO: 0.4 % (ref 0.3–6.2)
ERYTHROCYTE [DISTWIDTH] IN BLOOD BY AUTOMATED COUNT: 13.4 % (ref 12.3–15.4)
GLOBULIN UR ELPH-MCNC: 2.9 GM/DL
GLUCOSE SERPL-MCNC: 105 MG/DL (ref 65–99)
HBA1C MFR BLD: 6.4 % (ref 4.8–5.6)
HCT VFR BLD AUTO: 46.2 % (ref 37.5–51)
HDLC SERPL-MCNC: 62 MG/DL (ref 40–60)
HGB BLD-MCNC: 14.6 G/DL (ref 13–17.7)
IMM GRANULOCYTES # BLD AUTO: 0.03 10*3/MM3 (ref 0–0.05)
IMM GRANULOCYTES NFR BLD AUTO: 0.4 % (ref 0–0.5)
LDLC SERPL CALC-MCNC: 70 MG/DL (ref 0–100)
LDLC/HDLC SERPL: 1.15 {RATIO}
LYMPHOCYTES # BLD AUTO: 1.82 10*3/MM3 (ref 0.7–3.1)
LYMPHOCYTES NFR BLD AUTO: 22.8 % (ref 19.6–45.3)
MCH RBC QN AUTO: 29.3 PG (ref 26.6–33)
MCHC RBC AUTO-ENTMCNC: 31.6 G/DL (ref 31.5–35.7)
MCV RBC AUTO: 92.8 FL (ref 79–97)
MICROALBUMIN/CREAT UR: 11.5 MG/G (ref 0–29)
MONOCYTES # BLD AUTO: 0.9 10*3/MM3 (ref 0.1–0.9)
MONOCYTES NFR BLD AUTO: 11.3 % (ref 5–12)
NEUTROPHILS NFR BLD AUTO: 5.15 10*3/MM3 (ref 1.7–7)
NEUTROPHILS NFR BLD AUTO: 64.6 % (ref 42.7–76)
NRBC BLD AUTO-RTO: 0 /100 WBC (ref 0–0.2)
PLATELET # BLD AUTO: 242 10*3/MM3 (ref 140–450)
PMV BLD AUTO: 10 FL (ref 6–12)
POTASSIUM SERPL-SCNC: 4 MMOL/L (ref 3.5–5.2)
PROT SERPL-MCNC: 6.6 G/DL (ref 6–8.5)
PSA SERPL-MCNC: 3.39 NG/ML (ref 0–4)
RBC # BLD AUTO: 4.98 10*6/MM3 (ref 4.14–5.8)
SODIUM SERPL-SCNC: 144 MMOL/L (ref 136–145)
TRIGL SERPL-MCNC: 55 MG/DL (ref 0–150)
VLDLC SERPL-MCNC: 12 MG/DL (ref 5–40)
WBC NRBC COR # BLD: 7.97 10*3/MM3 (ref 3.4–10.8)

## 2023-11-15 PROCEDURE — 85025 COMPLETE CBC W/AUTO DIFF WBC: CPT

## 2023-11-15 PROCEDURE — 80053 COMPREHEN METABOLIC PANEL: CPT

## 2023-11-15 PROCEDURE — 83036 HEMOGLOBIN GLYCOSYLATED A1C: CPT

## 2023-11-15 PROCEDURE — G0103 PSA SCREENING: HCPCS

## 2023-11-15 PROCEDURE — 82043 UR ALBUMIN QUANTITATIVE: CPT

## 2023-11-15 PROCEDURE — 80061 LIPID PANEL: CPT

## 2023-11-15 PROCEDURE — 82570 ASSAY OF URINE CREATININE: CPT

## 2023-11-29 ENCOUNTER — OFFICE VISIT (OUTPATIENT)
Dept: PULMONOLOGY | Facility: CLINIC | Age: 78
End: 2023-11-29
Payer: MEDICARE

## 2023-11-29 ENCOUNTER — OFFICE VISIT (OUTPATIENT)
Dept: INTERNAL MEDICINE | Facility: CLINIC | Age: 78
End: 2023-11-29
Payer: MEDICARE

## 2023-11-29 VITALS
OXYGEN SATURATION: 94 % | TEMPERATURE: 98.2 F | HEART RATE: 73 BPM | SYSTOLIC BLOOD PRESSURE: 118 MMHG | DIASTOLIC BLOOD PRESSURE: 70 MMHG | HEIGHT: 67 IN | WEIGHT: 172.1 LBS | BODY MASS INDEX: 27.01 KG/M2

## 2023-11-29 VITALS
HEIGHT: 67 IN | TEMPERATURE: 98 F | WEIGHT: 170.8 LBS | DIASTOLIC BLOOD PRESSURE: 72 MMHG | BODY MASS INDEX: 26.81 KG/M2 | SYSTOLIC BLOOD PRESSURE: 138 MMHG | HEART RATE: 72 BPM

## 2023-11-29 DIAGNOSIS — I10 ESSENTIAL HYPERTENSION: ICD-10-CM

## 2023-11-29 DIAGNOSIS — J45.909 IDIOPATHIC ASTHMA: Primary | ICD-10-CM

## 2023-11-29 DIAGNOSIS — D72.18 EOSINOPHILIC GRANULOMATOSIS WITH POLYANGIITIS (EGPA): ICD-10-CM

## 2023-11-29 DIAGNOSIS — J45.909 IDIOPATHIC ASTHMA: ICD-10-CM

## 2023-11-29 DIAGNOSIS — R73.09 ABNORMAL GLUCOSE: ICD-10-CM

## 2023-11-29 DIAGNOSIS — K21.9 CHRONIC GERD: ICD-10-CM

## 2023-11-29 DIAGNOSIS — Z78.9 NON-SMOKER: ICD-10-CM

## 2023-11-29 DIAGNOSIS — M30.1 EOSINOPHILIC GRANULOMATOSIS WITH POLYANGIITIS (EGPA): ICD-10-CM

## 2023-11-29 DIAGNOSIS — I25.10 CORONARY ARTERY DISEASE INVOLVING NATIVE CORONARY ARTERY OF NATIVE HEART WITHOUT ANGINA PECTORIS: ICD-10-CM

## 2023-11-29 DIAGNOSIS — I73.9 PERIPHERAL VASCULAR DISEASE: ICD-10-CM

## 2023-11-29 DIAGNOSIS — Z00.00 MEDICARE ANNUAL WELLNESS VISIT, SUBSEQUENT: Primary | ICD-10-CM

## 2023-11-29 DIAGNOSIS — E78.2 MIXED HYPERLIPIDEMIA: ICD-10-CM

## 2023-11-29 PROCEDURE — 1159F MED LIST DOCD IN RCRD: CPT | Performed by: INTERNAL MEDICINE

## 2023-11-29 PROCEDURE — 3075F SYST BP GE 130 - 139MM HG: CPT | Performed by: INTERNAL MEDICINE

## 2023-11-29 PROCEDURE — G0439 PPPS, SUBSEQ VISIT: HCPCS | Performed by: INTERNAL MEDICINE

## 2023-11-29 PROCEDURE — 3078F DIAST BP <80 MM HG: CPT | Performed by: INTERNAL MEDICINE

## 2023-11-29 PROCEDURE — 99397 PER PM REEVAL EST PAT 65+ YR: CPT | Performed by: INTERNAL MEDICINE

## 2023-11-29 PROCEDURE — 1160F RVW MEDS BY RX/DR IN RCRD: CPT | Performed by: INTERNAL MEDICINE

## 2023-11-29 NOTE — PROGRESS NOTES
QUICK REFERENCE INFORMATION:  The ABCs of the Annual Wellness Visit    Subsequent Medicare Wellness Visit    HEALTH RISK ASSESSMENT    1945    Recent Hospitalizations:  No hospitalization(s) within the last year..        Current Medical Providers:  Patient Care Team:  Siomn Easley MD as PCP - General  Vic Blas MD as Consulting Physician (Cardiology)  Sukhdev Sun MD (Dermatology)  Jaspreet Quiroz MD as Emergency Attending (Pulmonary Disease)  Joey Elizalde MD as Consulting Physician (Ophthalmology)  Jaspreet Quiroz MD as Emergency Attending (Pulmonary Disease)        Smoking Status:  Social History     Tobacco Use   Smoking Status Never   Smokeless Tobacco Never       Alcohol Consumption:  Social History     Substance and Sexual Activity   Alcohol Use Yes    Alcohol/week: 1.0 standard drink of alcohol    Types: 1 Glasses of wine per week       Depression Screen:       2023    11:01 AM   PHQ-2/PHQ-9 Depression Screening   Little Interest or Pleasure in Doing Things 0-->not at all   Feeling Down, Depressed or Hopeless 0-->not at all   PHQ-9: Brief Depression Severity Measure Score 0       Health Habits and Functional and Cognitive Screenin/29/2023    10:50 AM   Functional & Cognitive Status   Do you have difficulty preparing food and eating? No   Do you have difficulty bathing yourself, getting dressed or grooming yourself? No   Do you have difficulty using the toilet? No   Do you have difficulty moving around from place to place? No   Do you have trouble with steps or getting out of a bed or a chair? No   Current Diet Well Balanced Diet   Dental Exam Up to date   Eye Exam Up to date   Exercise (times per week) 0 times per week   Current Exercises Include No Regular Exercise   Do you need help using the phone?  No   Are you deaf or do you have serious difficulty hearing?  No   Do you need help to go to places out of walking distance? No   Do you  need help shopping? No   Do you need help preparing meals?  No   Do you need help with housework?  No   Do you need help with laundry? No   Do you need help taking your medications? No   Do you need help managing money? No   Do you ever drive or ride in a car without wearing a seat belt? No   Have you felt unusual stress, anger or loneliness in the last month? No   Who do you live with? Alone   If you need help, do you have trouble finding someone available to you? No   Have you been bothered in the last four weeks by sexual problems? No   Do you have difficulty concentrating, remembering or making decisions? No       [facility] Screen[facility]  [facility], and patient is at LOW risk for falls.Assessment completed on:11/29/2023    ACE III MINI        Does the patient have evidence of cognitive impairment? No    Aspirin use counseling: Taking ASA appropriately as indicated    Recent Lab Results:  CMP:  Lab Results   Component Value Date    BUN 15 11/15/2023    CREATININE 1.26 11/15/2023    EGFRIFNONA 65 11/11/2021    EGFRIFAFRI 79 11/11/2021    BCR 11.9 11/15/2023     11/15/2023    K 4.0 11/15/2023    CO2 32.0 (H) 11/15/2023    CALCIUM 9.2 11/15/2023    PROTENTOTREF 6.1 05/24/2023    ALBUMIN 3.7 11/15/2023    LABGLOBREF 2.4 05/24/2023    LABIL2 1.5 05/24/2023    BILITOT 1.3 (H) 11/15/2023    ALKPHOS 63 11/15/2023    AST 20 11/15/2023    ALT 12 11/15/2023     HbA1c:  Lab Results   Component Value Date    HGBA1C 6.40 (H) 11/15/2023    HGBA1C 6.20 (H) 05/24/2023     Microalbumin:  Lab Results   Component Value Date    MICROALBUR 1.8 11/15/2023     Lipid Panel  Lab Results   Component Value Date    CHOL 144 11/15/2023    TRIG 55 11/15/2023    HDL 62 (H) 11/15/2023    LDL 70 11/15/2023    AST 20 11/15/2023    ALT 12 11/15/2023       Visual Acuity:  No results found.    Age-appropriate Screening Schedule:  Refer to the list below for future screening recommendations based on patient's age, sex  and/or medical conditions. Orders for these recommended tests are listed in the plan section. The patient has been provided with a written plan.    Health Maintenance   Topic Date Due    BMI FOLLOWUP  Never done    TDAP/TD VACCINES (2 - Td or Tdap) 06/25/2023    ANNUAL WELLNESS VISIT  11/23/2023    LIPID PANEL  11/15/2024    COLORECTAL CANCER SCREENING  03/12/2029    HEPATITIS C SCREENING  Completed    COVID-19 Vaccine  Completed    INFLUENZA VACCINE  Completed    Pneumococcal Vaccine 65+  Completed    ZOSTER VACCINE  Completed        Subjective   History of Present Illness    Uzair Jacob is a 78 y.o. male who presents for a Subsequent Medicare Annual Wellness Examination and for follow-up of hypertension, hyperlipidemia, and abnormal glucose. He sees cardiology for coronary artery disease and pulmonary for chronic persistent asthma.    Chronic persistent asthma  He has asthma constantly, but he never gets a cold. He does not remember the last time he had a cold. He denies any chest pain.    Hypertension  His blood pressure is 140/72 mmHg today.    Health maintenance  He has received the influenza vaccine and the latest COVID-19 vaccine. He has not received the RSV vaccine yet. Physically he can do what he needs to. His stomach is doing well. His legs are doing well.    CHRONIC CONDITIONS: Hypertension, hyperlipidemia, and abnormal glucose.    The following portions of the patient's history were reviewed and updated as appropriate: allergies, current medications, past family history, past medical history, past social history, past surgical history, and problem list.    Outpatient Medications Prior to Visit   Medication Sig Dispense Refill    albuterol sulfate HFA (Ventolin HFA) 108 (90 Base) MCG/ACT inhaler Inhale 2 puffs Every 4 (Four) Hours As Needed for Wheezing. 18 g 1    aspirin 325 MG tablet Take 1 tablet by mouth Daily.      atorvastatin (LIPITOR) 20 MG tablet TAKE 1 TABLET BY MOUTH EVERYDAY AT  BEDTIME 90 tablet 3    Budeson-Glycopyrrol-Formoterol (Breztri Aerosphere) 160-9-4.8 MCG/ACT aerosol inhaler Inhale 2 puffs 2 (Two) Times a Day. 3 each 3    chlorthalidone (HYGROTON) 25 MG tablet TAKE 1/2 TABLET BY MOUTH EVERY DAY 45 tablet 3    esomeprazole (nexIUM) 20 MG capsule Take 1 capsule by mouth Every Morning Before Breakfast.      lisinopril (PRINIVIL,ZESTRIL) 10 MG tablet TAKE 1 TABLET BY MOUTH EVERY DAY 90 tablet 3    Mepolizumab 100 MG/ML solution prefilled syringe Inject 1 mL under the skin into the appropriate area as directed Every 28 (Twenty-Eight) Days. 1 each 11    nebivolol (BYSTOLIC) 10 MG tablet TAKE 1 TABLET BY MOUTH EVERY DAY 90 tablet 3    omeprazole (priLOSEC) 20 MG capsule Take 1 capsule by mouth Daily.      ipratropium (ATROVENT) 0.03 % nasal spray 2 sprays into the nostril(s) as directed by provider Every 12 (Twelve) Hours. (Patient not taking: Reported on 11/29/2023) 1 each 12    Triamcinolone Acetonide (NASACORT) 55 MCG/ACT nasal inhaler 2 sprays into the nostril(s) as directed by provider 2 (Two) Times a Day. (Patient not taking: Reported on 11/29/2023)       No facility-administered medications prior to visit.       Patient Active Problem List   Diagnosis    CAD (coronary artery disease)    PAD (peripheral artery disease)    Essential hypertension    Mixed hyperlipidemia    Peripheral vascular disease    Edema of right lower extremity    Annual physical exam    Alcohol use    Preoperative examination    Sinus bradycardia, persistent    Postphlebitic syndrome    GERD    Perennial rhinitis    Non-smoker    Eosinophilic granulomatosis with polyangiitis (EGPA)    Bilateral pulmonary infiltrates    Screening PSA (prostate specific antigen)    Medicare annual wellness visit, subsequent    Chronic persistent asthma    Abnormal glucose       Advance Care Planning:  ACP discussion was held with the patient during this visit. Patient has an advance directive (not in EMR), copy  requested.    Identification of Risk Factors:  Risk factors include: Advance Directive Discussion.    Review of Systems   Constitutional:  Negative for chills, fatigue and fever.   HENT:  Negative for congestion, ear pain and sinus pressure.    Respiratory:  Negative for cough, chest tightness, shortness of breath and wheezing.    Cardiovascular:  Negative for chest pain and palpitations.   Gastrointestinal:  Negative for abdominal pain, blood in stool and constipation.   Skin:  Negative for color change.   Allergic/Immunologic: Negative for environmental allergies.   Neurological:  Negative for dizziness, speech difficulty and headaches.   Psychiatric/Behavioral:  Negative for confusion. The patient is not nervous/anxious.        Compared to one year ago, the patient feels his physical health is the same.  Compared to one year ago, the patient feels his mental health is the same.    Objective     Physical Exam  Vitals and nursing note reviewed.   Constitutional:       Appearance: He is well-developed.   HENT:      Head: Normocephalic and atraumatic.      Right Ear: External ear normal.      Left Ear: External ear normal.      Nose: Nose normal.      Mouth/Throat:      Pharynx: No oropharyngeal exudate.   Eyes:      Conjunctiva/sclera: Conjunctivae normal.      Pupils: Pupils are equal, round, and reactive to light.   Neck:      Thyroid: No thyromegaly.      Vascular: No JVD.   Cardiovascular:      Rate and Rhythm: Normal rate and regular rhythm.      Heart sounds: Normal heart sounds. No murmur heard.     No friction rub. No gallop.   Pulmonary:      Effort: Pulmonary effort is normal. No respiratory distress.      Breath sounds: Normal breath sounds. No wheezing or rales.   Chest:      Chest wall: No tenderness.   Abdominal:      General: Bowel sounds are normal. There is no distension.      Palpations: Abdomen is soft. There is no mass.      Tenderness: There is no abdominal tenderness. There is no guarding or  "rebound.      Hernia: No hernia is present.   Musculoskeletal:         General: No tenderness. Normal range of motion.      Cervical back: Normal range of motion and neck supple.   Lymphadenopathy:      Cervical: No cervical adenopathy.   Skin:     General: Skin is warm and dry.      Findings: No erythema or rash.   Neurological:      Mental Status: He is alert and oriented to person, place, and time.      Cranial Nerves: No cranial nerve deficit.      Sensory: No sensory deficit.      Motor: No abnormal muscle tone.      Coordination: Coordination normal.      Deep Tendon Reflexes: Reflexes normal.   Psychiatric:         Behavior: Behavior normal.         Thought Content: Thought content normal.         Judgment: Judgment normal.          Procedures     Vitals:    11/29/23 1101 11/29/23 1131   BP: 140/72 138/72   BP Location: Left arm    Patient Position: Sitting    Cuff Size: Adult    Pulse: 72    Temp: 98 °F (36.7 °C)    Weight: 77.5 kg (170 lb 12.8 oz)    Height: 171.3 cm (67.44\")    PainSc: 0-No pain        BMI is within normal parameters. No other follow-up for BMI required.      Assessment & Plan   Problem List Items Addressed This Visit          Cardiac and Vasculature    CAD (coronary artery disease)    Overview     Catheterization, March 2012:  70% to 80% mid-LAD with ‘borderline” FFR.  3.5 x 15 Xience.  EF normal.             Relevant Medications    nebivolol (BYSTOLIC) 10 MG tablet    Essential hypertension    Overview     Continue lisinopril 10 mg tablets, chlorthalidone 25 mg tablets daily.         Relevant Medications    chlorthalidone (HYGROTON) 25 MG tablet    lisinopril (PRINIVIL,ZESTRIL) 10 MG tablet    nebivolol (BYSTOLIC) 10 MG tablet    Mixed hyperlipidemia    Overview     Continue atorvastatin 20 mg tablets daily.         Relevant Medications    atorvastatin (LIPITOR) 20 MG tablet    Peripheral vascular disease       Endocrine and Metabolic    Abnormal glucose       Health Encounters    " Medicare annual wellness visit, subsequent - Primary       Pulmonary and Pneumonias    Chronic persistent asthma    Relevant Medications    albuterol sulfate HFA (Ventolin HFA) 108 (90 Base) MCG/ACT inhaler    Mepolizumab 100 MG/ML solution prefilled syringe    Budeson-Glycopyrrol-Formoterol (Breztri Aerosphere) 160-9-4.8 MCG/ACT aerosol inhaler    mepolizumab (NUCALA) injection 100 mg (Completed)    Other Relevant Orders    Ambulatory Referral to Pulmonology     1. Prevention  - Overall, he appears to be doing well. He remains physically active. He is up to date on COVID-19 and influenza vaccines. RSV vaccine was recommended.    2. Hypertension  - His blood pressure is well controlled on nebivolol and lisinopril.    3. Hyperlipidemia  - His lipids remain very well controlled on atorvastatin.    4. Coronary artery disease  - His coronary artery disease is asymptomatic on nebivolol, lisinopril, atorvastatin, and aspirin. He will continue to follow up with cardiology.    5. Chronic persistent asthma  - He will continue to follow up with pulmonary.    6. Abnormal glucose  - His hemoglobin A1c is 6.4 percent, which is almost at the diabetic range. He admits that he has been less physically active since the death of his wife. This will motivate him to work on this and get his hemoglobin A1c down.    He will follow up in 6 months.    Patient Self-Management and Personalized Health Advice  The patient has been provided with information about: diet and exercise and preventive services including:   Annual Wellness Visit (AWV).    Outpatient Encounter Medications as of 11/29/2023   Medication Sig Dispense Refill    albuterol sulfate HFA (Ventolin HFA) 108 (90 Base) MCG/ACT inhaler Inhale 2 puffs Every 4 (Four) Hours As Needed for Wheezing. 18 g 1    aspirin 325 MG tablet Take 1 tablet by mouth Daily.      atorvastatin (LIPITOR) 20 MG tablet TAKE 1 TABLET BY MOUTH EVERYDAY AT BEDTIME 90 tablet 3     Budeson-Glycopyrrol-Formoterol (Breztri Aerosphere) 160-9-4.8 MCG/ACT aerosol inhaler Inhale 2 puffs 2 (Two) Times a Day. 3 each 3    chlorthalidone (HYGROTON) 25 MG tablet TAKE 1/2 TABLET BY MOUTH EVERY DAY 45 tablet 3    esomeprazole (nexIUM) 20 MG capsule Take 1 capsule by mouth Every Morning Before Breakfast.      lisinopril (PRINIVIL,ZESTRIL) 10 MG tablet TAKE 1 TABLET BY MOUTH EVERY DAY 90 tablet 3    Mepolizumab 100 MG/ML solution prefilled syringe Inject 1 mL under the skin into the appropriate area as directed Every 28 (Twenty-Eight) Days. 1 each 11    nebivolol (BYSTOLIC) 10 MG tablet TAKE 1 TABLET BY MOUTH EVERY DAY 90 tablet 3    omeprazole (priLOSEC) 20 MG capsule Take 1 capsule by mouth Daily.      ipratropium (ATROVENT) 0.03 % nasal spray 2 sprays into the nostril(s) as directed by provider Every 12 (Twelve) Hours. (Patient not taking: Reported on 11/29/2023) 1 each 12    Triamcinolone Acetonide (NASACORT) 55 MCG/ACT nasal inhaler 2 sprays into the nostril(s) as directed by provider 2 (Two) Times a Day. (Patient not taking: Reported on 11/29/2023)       No facility-administered encounter medications on file as of 11/29/2023.       Reviewed use of high risk medication in the elderly: yes  Reviewed for potential of harmful drug interactions in the elderly: yes    Follow Up:  Return in about 6 months (around 5/29/2024) for follow up.     There are no Patient Instructions on file for this visit.    An After Visit Summary and PPPS with all of these plans were given to the patient.       Transcribed from ambient dictation for Simon Easley MD by Reyna Lam.  11/29/23   13:47 EST    Patient or patient representative verbalized consent to the visit recording.  I have personally performed the services described in this document as transcribed by the above individual, and it is both accurate and complete.

## 2023-11-29 NOTE — PROGRESS NOTES
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, perennial rhinitis, non-smoker, reflux    History of Present Illness     78-year-old male returns today for follow-up  Last saw him 5/19/2023    He has severe obstructive airways disease due to chronic persistent asthma  He has had a positive atypical ANCA and elevated eosinophil count  He has been on Nucala for presumptive diagnosis for EGPA  Initially had significant improvement in symptoms and resolution of the previously noted pulmonary infiltrates    He has had persistent airway obstruction on spirometry  He feels as though his symptoms are not as well-controlled  He remains on Breztri with albuterol as needed    He has perennial rhinitis and uses saline rinses, and antihistamine, and Nasacort on a regular basis    Lifelong non-smoker    Patient Active Problem List   Diagnosis    CAD (coronary artery disease)    PAD (peripheral artery disease)    Essential hypertension    Mixed hyperlipidemia    Peripheral vascular disease    Edema of right lower extremity    Annual physical exam    Alcohol use    Preoperative examination    Sinus bradycardia, persistent    Postphlebitic syndrome    GERD    Perennial rhinitis    Non-smoker    Eosinophilic granulomatosis with polyangiitis (EGPA)    Bilateral pulmonary infiltrates    Screening PSA (prostate specific antigen)    Medicare annual wellness visit, subsequent    Chronic persistent asthma    Abnormal glucose      No Known Allergies    Current Outpatient Medications:     albuterol sulfate HFA (Ventolin HFA) 108 (90 Base) MCG/ACT inhaler, Inhale 2 puffs Every 4 (Four) Hours As Needed for Wheezing., Disp: 18 g, Rfl: 1    aspirin 325 MG tablet, Take 1 tablet by mouth Daily., Disp: , Rfl:     atorvastatin (LIPITOR) 20 MG tablet, TAKE 1 TABLET BY MOUTH EVERYDAY AT BEDTIME, Disp: 90 tablet, Rfl: 3    Budeson-Glycopyrrol-Formoterol (Breztri Aerosphere) 160-9-4.8 MCG/ACT aerosol inhaler, Inhale 2  puffs 2 (Two) Times a Day., Disp: 3 each, Rfl: 3    chlorthalidone (HYGROTON) 25 MG tablet, TAKE 1/2 TABLET BY MOUTH EVERY DAY, Disp: 45 tablet, Rfl: 3    esomeprazole (nexIUM) 20 MG capsule, Take 1 capsule by mouth Every Morning Before Breakfast., Disp: , Rfl:     ipratropium (ATROVENT) 0.03 % nasal spray, 2 sprays into the nostril(s) as directed by provider Every 12 (Twelve) Hours. (Patient not taking: Reported on 11/29/2023), Disp: 1 each, Rfl: 12    lisinopril (PRINIVIL,ZESTRIL) 10 MG tablet, TAKE 1 TABLET BY MOUTH EVERY DAY, Disp: 90 tablet, Rfl: 3    Mepolizumab 100 MG/ML solution prefilled syringe, Inject 1 mL under the skin into the appropriate area as directed Every 28 (Twenty-Eight) Days., Disp: 1 each, Rfl: 11    nebivolol (BYSTOLIC) 10 MG tablet, TAKE 1 TABLET BY MOUTH EVERY DAY, Disp: 90 tablet, Rfl: 3    omeprazole (priLOSEC) 20 MG capsule, Take 1 capsule by mouth Daily., Disp: , Rfl:     Triamcinolone Acetonide (NASACORT) 55 MCG/ACT nasal inhaler, 2 sprays into the nostril(s) as directed by provider 2 (Two) Times a Day. (Patient not taking: Reported on 11/29/2023), Disp: , Rfl:   MEDICATION LIST AND ALLERGIES REVIEWED.    Family History   Problem Relation Age of Onset    Esophageal cancer Father         cause of death    Cancer Father     Asthma Sister     Asthma Brother      Social History     Tobacco Use    Smoking status: Never    Smokeless tobacco: Never   Vaping Use    Vaping Use: Never used   Substance Use Topics    Alcohol use: Yes     Alcohol/week: 1.0 standard drink of alcohol     Types: 1 Glasses of wine per week    Drug use: Never     Social History     Social History Narrative        Retired     Lifelong non-smoker    Drinks about 1 alcoholic beverage on a weekly basis     FAMILY AND SOCIAL HISTORY REVIEWED.    Review of Systems  IF PRESENT REFER TO SCANNED ROS SHEET FROM SAME DATE  OTHERWISE ROS OBTAINED AND NON-CONTRIBUTORY OVER HPI.    /70 (BP Location:  "Right arm, Patient Position: Sitting, Cuff Size: Adult)   Pulse 73   Temp 98.2 °F (36.8 °C) (Infrared)   Ht 171.3 cm (67.44\")   Wt 78.1 kg (172 lb 1.6 oz)   SpO2 94% Comment: room air at rest  BMI 26.60 kg/m²   Physical Exam  Vitals and nursing note reviewed.   Constitutional:       General: He is not in acute distress.     Appearance: He is well-developed. He is not diaphoretic.   HENT:      Head: Normocephalic and atraumatic.   Neck:      Thyroid: No thyromegaly.   Cardiovascular:      Rate and Rhythm: Normal rate and regular rhythm.      Heart sounds: Normal heart sounds. No murmur heard.  Pulmonary:      Effort: Pulmonary effort is normal.      Breath sounds: Normal breath sounds. No stridor.   Lymphadenopathy:      Cervical: No cervical adenopathy.      Upper Body:      Right upper body: No supraclavicular or epitrochlear adenopathy.      Left upper body: No supraclavicular or epitrochlear adenopathy.   Skin:     General: Skin is warm and dry.   Neurological:      Mental Status: He is alert and oriented to person, place, and time.   Psychiatric:         Behavior: Behavior normal.         Results     Chest x-ray reveals no consolidation or effusions    PFTs reveal severe airway obstruction, no restriction, and a normal diffusion capacity    Immunization History   Administered Date(s) Administered    COVID-19 (PFIZER) BIVALENT 12+YRS 11/05/2022    COVID-19 (PFIZER) Purple Cap Monovalent 01/11/2021, 02/01/2021, 10/02/2021    COVID-19 F23 (PFIZER) 12YRS+ (COMIRNATY) 10/30/2023    Covid-19 (Pfizer) Gray Cap Monovalent 06/11/2022    FLUAD TRI 65YR+ 10/21/2019, 09/30/2020    Fluad Quad 65+ 10/11/2021    Fluzone High Dose =>65 Years ([employer] ONLY) 10/01/2014, 12/14/2016, 10/10/2017, 11/30/2017, 10/01/2018, 10/21/2019, 09/30/2020    Fluzone High-Dose 65+yrs 09/30/2020, 11/05/2022, 10/19/2023    Fluzone Quad >6mos (Multi-dose) 10/30/2015    Hepatitis A 11/06/2018, 04/06/2019, 05/03/2019    Pneumococcal Conjugate " 13-Valent (PCV13) 12/17/2014    Pneumococcal Polysaccharide (PPSV23) 06/25/2013, 11/16/2021    Pneumococcal, Unspecified 06/25/2013    Shingrix 03/18/2020, 05/01/2020, 06/10/2020    Tdap 06/25/2013    Zostavax 06/20/2012     Problem List       ICD-10-CM ICD-9-CM   1. Chronic persistent asthma  J45.909 493.90   2. Eosinophilic granulomatosis with polyangiitis (EGPA)  M30.1 446.4    D72.18    3. GERD  K21.9 530.81   4. Non-smoker  Z78.9 V49.89       Discussion     Symptomatically he is stable but continues to be quite limited by shortness of breath  Spirometry continues to exhibit severe airway obstruction, somewhat worse  We talked about medication options  At this point I have recommended he remain on Breztri with albuterol as needed  I am going to see about increasing his Nucala to 300 mg dose    Based on his response, I might consider obtaining a high-resolution CT scan of the chest to rule out underlying ILD such as obliterative bronchiolitis    Would recommend reflux precautions    I will see him back in a couple of months for follow-up    Moderate level of Medical Decision Making complexity based on 2 or more chronic stable illnesses and an independent review of test results and/or prescription drug management.    Jaspreet Quiroz MD  Note electronically signed    CC: Simon Easley MD

## 2023-12-07 ENCOUNTER — INFUSION (OUTPATIENT)
Dept: ONCOLOGY | Facility: HOSPITAL | Age: 78
End: 2023-12-07
Payer: MEDICARE

## 2023-12-07 VITALS
SYSTOLIC BLOOD PRESSURE: 154 MMHG | HEART RATE: 70 BPM | RESPIRATION RATE: 16 BRPM | TEMPERATURE: 97.5 F | DIASTOLIC BLOOD PRESSURE: 81 MMHG

## 2023-12-07 DIAGNOSIS — D72.18 EOSINOPHILIC GRANULOMATOSIS WITH POLYANGIITIS (EGPA): Primary | ICD-10-CM

## 2023-12-07 DIAGNOSIS — M30.1 EOSINOPHILIC GRANULOMATOSIS WITH POLYANGIITIS (EGPA): Primary | ICD-10-CM

## 2023-12-07 PROCEDURE — 25010000002 MEPOLIZUMAB 100 MG RECONSTITUTED SOLUTION: Performed by: NURSE PRACTITIONER

## 2023-12-07 PROCEDURE — 96372 THER/PROPH/DIAG INJ SC/IM: CPT

## 2023-12-07 RX ADMIN — MEPOLIZUMAB 100 MG: 100 INJECTION, POWDER, FOR SOLUTION SUBCUTANEOUS at 08:43

## 2023-12-12 DIAGNOSIS — J45.909 IDIOPATHIC ASTHMA: ICD-10-CM

## 2023-12-12 DIAGNOSIS — M30.1 EOSINOPHILIC GRANULOMATOSIS WITH POLYANGIITIS (EGPA): Primary | ICD-10-CM

## 2023-12-12 DIAGNOSIS — D72.18 EOSINOPHILIC GRANULOMATOSIS WITH POLYANGIITIS (EGPA): Primary | ICD-10-CM

## 2024-01-01 ENCOUNTER — TELEPHONE (OUTPATIENT)
Dept: INTERNAL MEDICINE | Facility: CLINIC | Age: 79
End: 2024-01-01
Payer: MEDICARE

## 2024-01-01 ENCOUNTER — OFFICE VISIT (OUTPATIENT)
Dept: INTERNAL MEDICINE | Facility: CLINIC | Age: 79
End: 2024-01-01
Payer: MEDICARE

## 2024-01-01 VITALS
SYSTOLIC BLOOD PRESSURE: 126 MMHG | OXYGEN SATURATION: 91 % | BODY MASS INDEX: 23.95 KG/M2 | HEIGHT: 67 IN | DIASTOLIC BLOOD PRESSURE: 62 MMHG | WEIGHT: 152.6 LBS | TEMPERATURE: 98.2 F | HEART RATE: 85 BPM

## 2024-01-01 DIAGNOSIS — I82.4Y1 ACUTE DEEP VEIN THROMBOSIS (DVT) OF PROXIMAL VEIN OF RIGHT LOWER EXTREMITY: ICD-10-CM

## 2024-01-01 DIAGNOSIS — J44.1 COPD EXACERBATION: Primary | ICD-10-CM

## 2024-01-01 DIAGNOSIS — I10 ESSENTIAL HYPERTENSION: ICD-10-CM

## 2024-01-01 PROCEDURE — 1160F RVW MEDS BY RX/DR IN RCRD: CPT | Performed by: INTERNAL MEDICINE

## 2024-01-01 PROCEDURE — 3074F SYST BP LT 130 MM HG: CPT | Performed by: INTERNAL MEDICINE

## 2024-01-01 PROCEDURE — 3078F DIAST BP <80 MM HG: CPT | Performed by: INTERNAL MEDICINE

## 2024-01-01 PROCEDURE — 1159F MED LIST DOCD IN RCRD: CPT | Performed by: INTERNAL MEDICINE

## 2024-01-01 PROCEDURE — G2211 COMPLEX E/M VISIT ADD ON: HCPCS | Performed by: INTERNAL MEDICINE

## 2024-01-01 PROCEDURE — 1126F AMNT PAIN NOTED NONE PRSNT: CPT | Performed by: INTERNAL MEDICINE

## 2024-01-01 PROCEDURE — 99214 OFFICE O/P EST MOD 30 MIN: CPT | Performed by: INTERNAL MEDICINE

## 2024-01-01 RX ORDER — IPRATROPIUM BROMIDE AND ALBUTEROL SULFATE 2.5; .5 MG/3ML; MG/3ML
3 SOLUTION RESPIRATORY (INHALATION) EVERY 4 HOURS PRN
Qty: 360 ML | Refills: 1 | Status: SHIPPED | OUTPATIENT
Start: 2024-01-01

## 2024-01-02 DIAGNOSIS — J45.909 IDIOPATHIC ASTHMA: ICD-10-CM

## 2024-01-02 DIAGNOSIS — M30.1 EOSINOPHILIC GRANULOMATOSIS WITH POLYANGIITIS (EGPA): ICD-10-CM

## 2024-01-02 DIAGNOSIS — D72.18 EOSINOPHILIC GRANULOMATOSIS WITH POLYANGIITIS (EGPA): ICD-10-CM

## 2024-01-02 NOTE — TELEPHONE ENCOUNTER
[facility] called requesting prescription switch from APRN to MD due to credentialed issue.

## 2024-01-04 ENCOUNTER — INFUSION (OUTPATIENT)
Dept: ONCOLOGY | Facility: HOSPITAL | Age: 79
End: 2024-01-04
Payer: MEDICARE

## 2024-01-04 VITALS
SYSTOLIC BLOOD PRESSURE: 147 MMHG | DIASTOLIC BLOOD PRESSURE: 73 MMHG | HEART RATE: 75 BPM | RESPIRATION RATE: 16 BRPM | TEMPERATURE: 98.6 F

## 2024-01-04 DIAGNOSIS — D72.18 EOSINOPHILIC GRANULOMATOSIS WITH POLYANGIITIS (EGPA): Primary | ICD-10-CM

## 2024-01-04 DIAGNOSIS — M30.1 EOSINOPHILIC GRANULOMATOSIS WITH POLYANGIITIS (EGPA): Primary | ICD-10-CM

## 2024-01-04 PROCEDURE — 25010000002 MEPOLIZUMAB 100 MG RECONSTITUTED SOLUTION: Performed by: INTERNAL MEDICINE

## 2024-01-04 PROCEDURE — 96372 THER/PROPH/DIAG INJ SC/IM: CPT

## 2024-01-04 RX ADMIN — MEPOLIZUMAB 300 MG: 100 INJECTION, POWDER, FOR SOLUTION SUBCUTANEOUS at 08:56

## 2024-01-23 NOTE — PROGRESS NOTES
Subjective:     Encounter Date:01/24/2024    Primary Care Physician: Simon Easley MD      Patient ID: Uzair Jacob is a 78 y.o. male.    Chief Complaint:Coronary Artery Disease    PROBLEM LIST:  Coronary artery disease:  Catheterization, March 2012: 70% to 80% mid-LAD with ‘borderline” FFR. 3.5 x 15 Xience. EF normal.   11/6/2019.  Normal stress perfusion and echo  Peripheral arterial disease:  Asymptomatic abnormal [place].  Right 0.7, left NC, April 2012.  Hypertension.  Hyperlipidemia.  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  1/18.  Asthma.  Gastroesophageal reflux disease.  Tonsillectomy.  Sinus polyp extraction.      No Known Allergies      Current Outpatient Medications:     albuterol sulfate HFA (Ventolin HFA) 108 (90 Base) MCG/ACT inhaler, Inhale 2 puffs Every 4 (Four) Hours As Needed for Wheezing., Disp: 18 g, Rfl: 1    aspirin 325 MG tablet, Take 1 tablet by mouth Daily., Disp: , Rfl:     atorvastatin (LIPITOR) 20 MG tablet, TAKE 1 TABLET BY MOUTH EVERYDAY AT BEDTIME, Disp: 90 tablet, Rfl: 3    Budeson-Glycopyrrol-Formoterol (Breztri Aerosphere) 160-9-4.8 MCG/ACT aerosol inhaler, Inhale 2 puffs 2 (Two) Times a Day., Disp: 3 each, Rfl: 3    chlorthalidone (HYGROTON) 25 MG tablet, TAKE 1/2 TABLET BY MOUTH EVERY DAY, Disp: 45 tablet, Rfl: 3    esomeprazole (nexIUM) 20 MG capsule, Take 1 capsule by mouth Every Morning Before Breakfast., Disp: , Rfl:     lisinopril (PRINIVIL,ZESTRIL) 10 MG tablet, TAKE 1 TABLET BY MOUTH EVERY DAY, Disp: 90 tablet, Rfl: 3    Mepolizumab 100 MG/ML solution prefilled syringe, Inject 3 mL under the skin into the appropriate area as directed Every 28 (Twenty-Eight) Days., Disp: 3 mL, Rfl: 11    nebivolol (BYSTOLIC) 10 MG tablet, TAKE 1 TABLET BY MOUTH EVERY DAY, Disp: 90 tablet, Rfl: 3    omeprazole (priLOSEC) 20 MG capsule, Take 1 capsule by mouth Daily., Disp: , Rfl:     ipratropium (ATROVENT) 0.03 %  "nasal spray, 2 sprays into the nostril(s) as directed by provider Every 12 (Twelve) Hours. (Patient not taking: Reported on 11/29/2023), Disp: 1 each, Rfl: 12    Triamcinolone Acetonide (NASACORT) 55 MCG/ACT nasal inhaler, 2 sprays into the nostril(s) as directed by provider 2 (Two) Times a Day. (Patient not taking: Reported on 11/29/2023), Disp: , Rfl:         History of Present Illness    Patient returns for annual follow-up for coronary disease and peripheral arterial disease.  He has been diagnosed with Churg-Reza syndrome in the interim.  He is being treated for this.  Thinks that is helping somewhat with his dyspnea.  Overall from cardiovascular point is unchanged.  Denies any exertional chest pain shortness of breath claudication orthopnea PND.  Blood pressure log reviewed from home with systolic blood pressures are consistently in the 120s with diastolics in the 60s.    The following portions of the patient's history were reviewed and updated as appropriate: allergies, current medications, past family history, past medical history, past social history, past surgical history and problem list.      Social History     Tobacco Use    Smoking status: Never    Smokeless tobacco: Never   Vaping Use    Vaping Use: Never used   Substance Use Topics    Alcohol use: Yes     Alcohol/week: 1.0 standard drink of alcohol     Types: 1 Glasses of wine per week    Drug use: Never         ROS       Objective:   /68   Pulse 64   Ht 170.2 cm (67\")   Wt 78.2 kg (172 lb 6.4 oz)   SpO2 96%   BMI 27.00 kg/m²         Vitals reviewed.   Constitutional:       Appearance: Well-developed and not in distress.   Neck:      Thyroid: No thyromegaly.      Vascular: No carotid bruit or JVD.   Pulmonary:      Breath sounds: Examination of the left-middle field reveals rhonchi. Examination of the right-lower field reveals rhonchi. Examination of the left-lower field reveals rhonchi. Rhonchi present.   Cardiovascular:      Regular " rhythm.      No gallop. No S3 and S4 gallop.   Pulses:     Intact distal pulses.      Carotid: 2+ bilaterally.     Radial: 2+ bilaterally.  Edema:     Peripheral edema absent.   Abdominal:      General: Bowel sounds are normal.      Palpations: Abdomen is soft. There is no abdominal mass.      Tenderness: There is no abdominal tenderness.   Musculoskeletal:         General: No deformity.      Extremities: No clubbing present.Skin:     General: Skin is warm and dry.      Findings: No rash.   Neurological:      Mental Status: Alert and oriented to person, place, and time.         Procedures          Assessment:   Assessment & Plan      Diagnoses and all orders for this visit:    1. Coronary artery disease involving native coronary artery of native heart without angina pectoris (Primary)      1.  Coronary artery disease, status post remote PCI, stable no angina  2.  Peripheral arterial disease asymptomatic  3.  Hypertension well-controlled by home blood pressure log  4.  Dyslipidemia well-controlled on moderate intensity statin.  Last LDL of 70.    Recommendations:  1.  Continue current medical therapy  2.  No ischemic evaluation in nearly 5 years.  Patient is asymptomatic we will defer for now.  Can perform if symptoms recur.         Advance Care Planning   ACP discussion was held with the patient during this visit. Patient does not have an advance directive, information provided.      Vic Blas MD    Dictated utilizing Dragon dictation

## 2024-01-24 ENCOUNTER — OFFICE VISIT (OUTPATIENT)
Dept: CARDIOLOGY | Facility: CLINIC | Age: 79
End: 2024-01-24
Payer: MEDICARE

## 2024-01-24 VITALS
HEART RATE: 64 BPM | OXYGEN SATURATION: 96 % | SYSTOLIC BLOOD PRESSURE: 161 MMHG | DIASTOLIC BLOOD PRESSURE: 68 MMHG | BODY MASS INDEX: 27.06 KG/M2 | HEIGHT: 67 IN | WEIGHT: 172.4 LBS

## 2024-01-24 DIAGNOSIS — I25.10 CORONARY ARTERY DISEASE INVOLVING NATIVE CORONARY ARTERY OF NATIVE HEART WITHOUT ANGINA PECTORIS: Primary | ICD-10-CM

## 2024-01-24 PROCEDURE — 1159F MED LIST DOCD IN RCRD: CPT | Performed by: INTERNAL MEDICINE

## 2024-01-24 PROCEDURE — 3077F SYST BP >= 140 MM HG: CPT | Performed by: INTERNAL MEDICINE

## 2024-01-24 PROCEDURE — 3078F DIAST BP <80 MM HG: CPT | Performed by: INTERNAL MEDICINE

## 2024-01-24 PROCEDURE — 1160F RVW MEDS BY RX/DR IN RCRD: CPT | Performed by: INTERNAL MEDICINE

## 2024-01-24 PROCEDURE — 99213 OFFICE O/P EST LOW 20 MIN: CPT | Performed by: INTERNAL MEDICINE

## 2024-02-01 ENCOUNTER — INFUSION (OUTPATIENT)
Dept: ONCOLOGY | Facility: HOSPITAL | Age: 79
End: 2024-02-01
Payer: MEDICARE

## 2024-02-01 VITALS
TEMPERATURE: 97.6 F | RESPIRATION RATE: 16 BRPM | DIASTOLIC BLOOD PRESSURE: 74 MMHG | SYSTOLIC BLOOD PRESSURE: 128 MMHG | HEART RATE: 78 BPM

## 2024-02-01 DIAGNOSIS — M30.1 EOSINOPHILIC GRANULOMATOSIS WITH POLYANGIITIS (EGPA): Primary | ICD-10-CM

## 2024-02-01 DIAGNOSIS — D72.18 EOSINOPHILIC GRANULOMATOSIS WITH POLYANGIITIS (EGPA): Primary | ICD-10-CM

## 2024-02-01 PROCEDURE — 96372 THER/PROPH/DIAG INJ SC/IM: CPT

## 2024-02-01 PROCEDURE — 25010000002 MEPOLIZUMAB 100 MG RECONSTITUTED SOLUTION: Performed by: INTERNAL MEDICINE

## 2024-02-01 RX ADMIN — MEPOLIZUMAB 300 MG: 100 INJECTION, POWDER, FOR SOLUTION SUBCUTANEOUS at 08:52

## 2024-02-27 ENCOUNTER — OFFICE VISIT (OUTPATIENT)
Dept: PULMONOLOGY | Facility: CLINIC | Age: 79
End: 2024-02-27
Payer: MEDICARE

## 2024-02-27 VITALS
TEMPERATURE: 97.8 F | HEIGHT: 67 IN | HEART RATE: 67 BPM | OXYGEN SATURATION: 94 % | BODY MASS INDEX: 26.77 KG/M2 | RESPIRATION RATE: 16 BRPM | WEIGHT: 170.56 LBS

## 2024-02-27 DIAGNOSIS — J45.909 IDIOPATHIC ASTHMA: ICD-10-CM

## 2024-02-27 DIAGNOSIS — D72.18 EOSINOPHILIC GRANULOMATOSIS WITH POLYANGIITIS (EGPA): Primary | ICD-10-CM

## 2024-02-27 DIAGNOSIS — J30.2 SEASONAL AND PERENNIAL ALLERGIC RHINITIS: ICD-10-CM

## 2024-02-27 DIAGNOSIS — Z78.9 NON-SMOKER: ICD-10-CM

## 2024-02-27 DIAGNOSIS — M30.1 EOSINOPHILIC GRANULOMATOSIS WITH POLYANGIITIS (EGPA): Primary | ICD-10-CM

## 2024-02-27 DIAGNOSIS — J30.89 SEASONAL AND PERENNIAL ALLERGIC RHINITIS: ICD-10-CM

## 2024-02-27 RX ORDER — ALBUTEROL SULFATE 90 UG/1
2 AEROSOL, METERED RESPIRATORY (INHALATION) EVERY 4 HOURS PRN
Qty: 18 G | Refills: 11 | Status: SHIPPED | OUTPATIENT
Start: 2024-02-27

## 2024-02-27 RX ORDER — ALBUTEROL SULFATE 90 UG/1
2 AEROSOL, METERED RESPIRATORY (INHALATION) EVERY 4 HOURS PRN
Qty: 18 G | Refills: 1 | Status: SHIPPED | OUTPATIENT
Start: 2024-02-27 | End: 2024-02-27 | Stop reason: SDUPTHER

## 2024-02-27 NOTE — PROGRESS NOTES
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, perennial rhinitis, non-smoker, reflux    History of Present Illness     78-year-old male returns today for follow-up  I last saw him 11/29/2023    He has severe obstructive airways disease due to chronic persistent asthma  He has had a positive atypical ANCA and elevated eosinophil counts in the past  He has been on Nucala for presumptive diagnosis of EGPA  Recently his Nucala was changed from 100 to 300 mg per dose given persistent symptoms and airway obstruction on spirometry    He is also on Breztri with albuterol as needed    He is in no acute exacerbation of symptoms since I last saw him    He is a lifelong non-smoker    He has perennial rhinitis for which she has used saline rinses, antihistamine, and Nasacort on a regular basis    Patient Active Problem List   Diagnosis    CAD (coronary artery disease)    PAD (peripheral artery disease)    Essential hypertension    Mixed hyperlipidemia    Peripheral vascular disease    Edema of right lower extremity    Annual physical exam    Alcohol use    Preoperative examination    Sinus bradycardia, persistent    Postphlebitic syndrome    GERD    Perennial rhinitis    Non-smoker    Eosinophilic granulomatosis with polyangiitis (EGPA)    Bilateral pulmonary infiltrates    Screening PSA (prostate specific antigen)    Medicare annual wellness visit, subsequent    Chronic persistent asthma    Abnormal glucose      No Known Allergies    Current Outpatient Medications:     albuterol sulfate HFA (Ventolin HFA) 108 (90 Base) MCG/ACT inhaler, Inhale 2 puffs Every 4 (Four) Hours As Needed for Wheezing., Disp: 18 g, Rfl: 1    aspirin 325 MG tablet, Take 1 tablet by mouth Daily., Disp: , Rfl:     atorvastatin (LIPITOR) 20 MG tablet, TAKE 1 TABLET BY MOUTH EVERYDAY AT BEDTIME, Disp: 90 tablet, Rfl: 3    Budeson-Glycopyrrol-Formoterol (Breztri Aerosphere) 160-9-4.8 MCG/ACT aerosol inhaler, Inhale 2  "puffs 2 (Two) Times a Day., Disp: 3 each, Rfl: 3    chlorthalidone (HYGROTON) 25 MG tablet, TAKE 1/2 TABLET BY MOUTH EVERY DAY, Disp: 45 tablet, Rfl: 3    esomeprazole (nexIUM) 20 MG capsule, Take 1 capsule by mouth Every Morning Before Breakfast., Disp: , Rfl:     ipratropium (ATROVENT) 0.03 % nasal spray, 2 sprays into the nostril(s) as directed by provider Every 12 (Twelve) Hours., Disp: 1 each, Rfl: 12    lisinopril (PRINIVIL,ZESTRIL) 10 MG tablet, TAKE 1 TABLET BY MOUTH EVERY DAY, Disp: 90 tablet, Rfl: 3    Mepolizumab 100 MG/ML solution prefilled syringe, Inject 3 mL under the skin into the appropriate area as directed Every 28 (Twenty-Eight) Days., Disp: 3 mL, Rfl: 11    nebivolol (BYSTOLIC) 10 MG tablet, TAKE 1 TABLET BY MOUTH EVERY DAY, Disp: 90 tablet, Rfl: 3    omeprazole (priLOSEC) 20 MG capsule, Take 1 capsule by mouth Daily., Disp: , Rfl:     Triamcinolone Acetonide (NASACORT) 55 MCG/ACT nasal inhaler, 2 sprays into the nostril(s) as directed by provider 2 (Two) Times a Day., Disp: , Rfl:   MEDICATION LIST AND ALLERGIES REVIEWED.    Family History   Problem Relation Age of Onset    Esophageal cancer Father         cause of death    Cancer Father     Asthma Sister     Asthma Brother      Social History     Tobacco Use    Smoking status: Never     Passive exposure: Never    Smokeless tobacco: Never   Vaping Use    Vaping Use: Never used   Substance Use Topics    Alcohol use: Yes     Alcohol/week: 1.0 standard drink of alcohol     Types: 1 Glasses of wine per week    Drug use: Never     Social History     Social History Narrative        Retired     Lifelong non-smoker    Drinks about 1 alcoholic beverage on a weekly basis     FAMILY AND SOCIAL HISTORY REVIEWED.    Review of Systems  IF PRESENT REFER TO SCANNED ROS SHEET FROM SAME DATE  OTHERWISE ROS OBTAINED AND NON-CONTRIBUTORY OVER HPI.    Pulse 67   Temp 97.8 °F (36.6 °C)   Resp 16   Ht 170.2 cm (67.01\")   Wt 77.4 kg " (170 lb 9 oz)   SpO2 94% Comment: room air at rest  BMI 26.71 kg/m²   Physical Exam  Vitals and nursing note reviewed.   Constitutional:       General: He is not in acute distress.     Appearance: He is well-developed. He is not diaphoretic.   HENT:      Head: Normocephalic and atraumatic.   Neck:      Thyroid: No thyromegaly.   Cardiovascular:      Rate and Rhythm: Normal rate and regular rhythm.      Heart sounds: Normal heart sounds. No murmur heard.  Pulmonary:      Effort: Pulmonary effort is normal.      Breath sounds: No stridor.      Comments: A few scattered wheezes, right greater than left  Lymphadenopathy:      Cervical: No cervical adenopathy.      Upper Body:      Right upper body: No supraclavicular or epitrochlear adenopathy.      Left upper body: No supraclavicular or epitrochlear adenopathy.   Skin:     General: Skin is warm and dry.   Neurological:      Mental Status: He is alert and oriented to person, place, and time.   Psychiatric:         Behavior: Behavior normal.         Results     He has just started on a trial of increased Nucala dosing for asthma and presumed EGPA  Immunization History   Administered Date(s) Administered    COVID-19 (PFIZER) BIVALENT 12+YRS 11/05/2022    COVID-19 (PFIZER) Purple Cap Monovalent 01/11/2021, 02/01/2021, 10/02/2021    COVID-19 F23 (PFIZER) 12YRS+ (COMIRNATY) 10/30/2023    Covid-19 (Pfizer) Gray Cap Monovalent 06/11/2022    FLUAD TRI 65YR+ 10/21/2019, 09/30/2020    Fluad Quad 65+ 10/11/2021    Fluzone High Dose =>65 Years ([facility] ONLY) 10/01/2014, 12/14/2016, 10/10/2017, 11/30/2017, 10/01/2018, 10/21/2019, 09/30/2020    Fluzone High-Dose 65+yrs 09/30/2020, 11/05/2022, 10/19/2023    Fluzone Quad >6mos (Multi-dose) 10/30/2015    Hepatitis A 11/06/2018, 04/06/2019, 05/03/2019    Pneumococcal Conjugate 13-Valent (PCV13) 12/17/2014    Pneumococcal Polysaccharide (PPSV23) 06/25/2013, 11/16/2021    Pneumococcal, Unspecified 06/25/2013    Shingrix 03/18/2020,  05/01/2020, 06/10/2020    Tdap 06/25/2013    Zostavax 06/20/2012     Problem List       ICD-10-CM ICD-9-CM   1. Eosinophilic granulomatosis with polyangiitis (EGPA)  M30.1 446.4    D72.18    2. Chronic persistent asthma  J45.909 493.90   3. Non-smoker  Z78.9 V49.89   4. Perennial rhinitis  J30.89 477.9    J30.2        Discussion     He has just started on a regimen of increased Nucala for chronic persistent asthma and presumed EGPA  So far symptomatically not sure if there is been any difference    I recommended a 3 to 4-month trial assuming its affordable and tolerated  Will get PFTs on return    Otherwise he is can remain on Breztri with albuterol as needed    Moderate level of Medical Decision Making complexity based on 2 or more chronic stable illnesses and an independent review of test results and/or prescription drug management.    Jaspreet Quiroz MD  Note electronically signed    CC: Simon Easley MD

## 2024-02-29 ENCOUNTER — INFUSION (OUTPATIENT)
Dept: ONCOLOGY | Facility: HOSPITAL | Age: 79
End: 2024-02-29
Payer: MEDICARE

## 2024-02-29 VITALS
HEART RATE: 75 BPM | DIASTOLIC BLOOD PRESSURE: 78 MMHG | SYSTOLIC BLOOD PRESSURE: 149 MMHG | RESPIRATION RATE: 18 BRPM | TEMPERATURE: 97.6 F

## 2024-02-29 DIAGNOSIS — M30.1 EOSINOPHILIC GRANULOMATOSIS WITH POLYANGIITIS (EGPA): Primary | ICD-10-CM

## 2024-02-29 DIAGNOSIS — D72.18 EOSINOPHILIC GRANULOMATOSIS WITH POLYANGIITIS (EGPA): Primary | ICD-10-CM

## 2024-02-29 PROCEDURE — 96372 THER/PROPH/DIAG INJ SC/IM: CPT

## 2024-02-29 PROCEDURE — 25010000002 MEPOLIZUMAB 100 MG RECONSTITUTED SOLUTION: Performed by: INTERNAL MEDICINE

## 2024-02-29 RX ADMIN — MEPOLIZUMAB 300 MG: 100 INJECTION, POWDER, FOR SOLUTION SUBCUTANEOUS at 08:42

## 2024-03-28 ENCOUNTER — INFUSION (OUTPATIENT)
Dept: ONCOLOGY | Facility: HOSPITAL | Age: 79
End: 2024-03-28
Payer: MEDICARE

## 2024-03-28 VITALS
TEMPERATURE: 97.1 F | SYSTOLIC BLOOD PRESSURE: 171 MMHG | RESPIRATION RATE: 16 BRPM | DIASTOLIC BLOOD PRESSURE: 77 MMHG | HEART RATE: 82 BPM

## 2024-03-28 DIAGNOSIS — M30.1 EOSINOPHILIC GRANULOMATOSIS WITH POLYANGIITIS (EGPA): Primary | ICD-10-CM

## 2024-03-28 DIAGNOSIS — D72.18 EOSINOPHILIC GRANULOMATOSIS WITH POLYANGIITIS (EGPA): Primary | ICD-10-CM

## 2024-03-28 PROCEDURE — 96372 THER/PROPH/DIAG INJ SC/IM: CPT

## 2024-03-28 PROCEDURE — 25010000002 MEPOLIZUMAB 100 MG RECONSTITUTED SOLUTION: Performed by: INTERNAL MEDICINE

## 2024-03-28 RX ADMIN — MEPOLIZUMAB 300 MG: 100 INJECTION, POWDER, FOR SOLUTION SUBCUTANEOUS at 08:45

## 2024-04-15 RX ORDER — CHLORTHALIDONE 25 MG/1
12.5 TABLET ORAL DAILY
Qty: 45 TABLET | Refills: 3 | Status: SHIPPED | OUTPATIENT
Start: 2024-04-15

## 2024-04-25 ENCOUNTER — INFUSION (OUTPATIENT)
Dept: ONCOLOGY | Facility: HOSPITAL | Age: 79
End: 2024-04-25
Payer: MEDICARE

## 2024-04-25 VITALS
SYSTOLIC BLOOD PRESSURE: 154 MMHG | HEART RATE: 88 BPM | RESPIRATION RATE: 18 BRPM | DIASTOLIC BLOOD PRESSURE: 79 MMHG | TEMPERATURE: 97.4 F

## 2024-04-25 DIAGNOSIS — M30.1 EOSINOPHILIC GRANULOMATOSIS WITH POLYANGIITIS (EGPA): Primary | ICD-10-CM

## 2024-04-25 DIAGNOSIS — D72.18 EOSINOPHILIC GRANULOMATOSIS WITH POLYANGIITIS (EGPA): Primary | ICD-10-CM

## 2024-04-25 PROCEDURE — 96372 THER/PROPH/DIAG INJ SC/IM: CPT

## 2024-04-25 PROCEDURE — 25010000002 MEPOLIZUMAB 100 MG RECONSTITUTED SOLUTION: Performed by: INTERNAL MEDICINE

## 2024-04-25 RX ADMIN — MEPOLIZUMAB 300 MG: 100 INJECTION, POWDER, FOR SOLUTION SUBCUTANEOUS at 08:49

## 2024-05-15 ENCOUNTER — OFFICE VISIT (OUTPATIENT)
Dept: PULMONOLOGY | Facility: CLINIC | Age: 79
End: 2024-05-15
Payer: MEDICARE

## 2024-05-15 VITALS
TEMPERATURE: 98.2 F | WEIGHT: 160 LBS | SYSTOLIC BLOOD PRESSURE: 118 MMHG | HEIGHT: 67 IN | OXYGEN SATURATION: 92 % | HEART RATE: 83 BPM | BODY MASS INDEX: 25.11 KG/M2 | DIASTOLIC BLOOD PRESSURE: 70 MMHG

## 2024-05-15 DIAGNOSIS — Z78.9 ALCOHOL USE: ICD-10-CM

## 2024-05-15 DIAGNOSIS — M30.1 EOSINOPHILIC GRANULOMATOSIS WITH POLYANGIITIS (EGPA): ICD-10-CM

## 2024-05-15 DIAGNOSIS — Z78.9 NON-SMOKER: ICD-10-CM

## 2024-05-15 DIAGNOSIS — D72.18 EOSINOPHILIC GRANULOMATOSIS WITH POLYANGIITIS (EGPA): ICD-10-CM

## 2024-05-15 DIAGNOSIS — J45.909 IDIOPATHIC ASTHMA: Primary | ICD-10-CM

## 2024-05-15 LAB
BASOPHILS # BLD AUTO: 0.07 10*3/MM3 (ref 0–0.2)
BASOPHILS NFR BLD AUTO: 0.6 % (ref 0–1.5)
DEPRECATED RDW RBC AUTO: 40 FL (ref 37–54)
EOSINOPHIL # BLD AUTO: 0.01 10*3/MM3 (ref 0–0.4)
EOSINOPHIL NFR BLD AUTO: 0.1 % (ref 0.3–6.2)
ERYTHROCYTE [DISTWIDTH] IN BLOOD BY AUTOMATED COUNT: 12.7 % (ref 12.3–15.4)
HCT VFR BLD AUTO: 43.7 % (ref 37.5–51)
HGB BLD-MCNC: 14.5 G/DL (ref 13–17.7)
IMM GRANULOCYTES # BLD AUTO: 0.04 10*3/MM3 (ref 0–0.05)
IMM GRANULOCYTES NFR BLD AUTO: 0.4 % (ref 0–0.5)
LYMPHOCYTES # BLD AUTO: 0.98 10*3/MM3 (ref 0.7–3.1)
LYMPHOCYTES NFR BLD AUTO: 8.9 % (ref 19.6–45.3)
MCH RBC QN AUTO: 29 PG (ref 26.6–33)
MCHC RBC AUTO-ENTMCNC: 33.2 G/DL (ref 31.5–35.7)
MCV RBC AUTO: 87.4 FL (ref 79–97)
MONOCYTES # BLD AUTO: 1.24 10*3/MM3 (ref 0.1–0.9)
MONOCYTES NFR BLD AUTO: 11.3 % (ref 5–12)
NEUTROPHILS NFR BLD AUTO: 78.7 % (ref 42.7–76)
NEUTROPHILS NFR BLD AUTO: 8.67 10*3/MM3 (ref 1.7–7)
NRBC BLD AUTO-RTO: 0 /100 WBC (ref 0–0.2)
PLATELET # BLD AUTO: 271 10*3/MM3 (ref 140–450)
PMV BLD AUTO: 9.9 FL (ref 6–12)
RBC # BLD AUTO: 5 10*6/MM3 (ref 4.14–5.8)
WBC NRBC COR # BLD AUTO: 11.01 10*3/MM3 (ref 3.4–10.8)

## 2024-05-15 PROCEDURE — 86003 ALLG SPEC IGE CRUDE XTRC EA: CPT | Performed by: INTERNAL MEDICINE

## 2024-05-15 PROCEDURE — 86602 ANTINOMYCES ANTIBODY: CPT | Performed by: INTERNAL MEDICINE

## 2024-05-15 PROCEDURE — 82104 ALPHA-1-ANTITRYPSIN PHENO: CPT | Performed by: INTERNAL MEDICINE

## 2024-05-15 PROCEDURE — 86037 ANCA TITER EACH ANTIBODY: CPT | Performed by: INTERNAL MEDICINE

## 2024-05-15 PROCEDURE — 86671 FUNGUS NES ANTIBODY: CPT | Performed by: INTERNAL MEDICINE

## 2024-05-15 PROCEDURE — 86609 BACTERIUM ANTIBODY: CPT | Performed by: INTERNAL MEDICINE

## 2024-05-15 PROCEDURE — 80053 COMPREHEN METABOLIC PANEL: CPT | Performed by: INTERNAL MEDICINE

## 2024-05-15 PROCEDURE — 83516 IMMUNOASSAY NONANTIBODY: CPT | Performed by: INTERNAL MEDICINE

## 2024-05-15 PROCEDURE — 82785 ASSAY OF IGE: CPT | Performed by: INTERNAL MEDICINE

## 2024-05-15 PROCEDURE — 82103 ALPHA-1-ANTITRYPSIN TOTAL: CPT | Performed by: INTERNAL MEDICINE

## 2024-05-15 PROCEDURE — 85025 COMPLETE CBC W/AUTO DIFF WBC: CPT | Performed by: INTERNAL MEDICINE

## 2024-05-15 PROCEDURE — 82164 ANGIOTENSIN I ENZYME TEST: CPT | Performed by: INTERNAL MEDICINE

## 2024-05-15 PROCEDURE — 86331 IMMUNODIFFUSION OUCHTERLONY: CPT | Performed by: INTERNAL MEDICINE

## 2024-05-15 PROCEDURE — 86606 ASPERGILLUS ANTIBODY: CPT | Performed by: INTERNAL MEDICINE

## 2024-05-15 RX ORDER — PREDNISONE 10 MG/1
TABLET ORAL
Qty: 120 TABLET | Refills: 2 | Status: SHIPPED | OUTPATIENT
Start: 2024-05-15

## 2024-05-15 NOTE — PROGRESS NOTES
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, perennial rhinitis, non-smoker, reflux     History of Present Illness     78-year-old male returns today for follow-up  I last saw him 2/27/2024    He has severe obstructive airways disease due to chronic persistent asthma  He has had a positive atypical ANCA and elevated eosinophil counts in the past  He has been on Nucala for presumptive diagnosis of EGPA  Initially did well but has been doing worse so we increased his Nucala from 100 to 300 mg per dose earlier this year  He is also on Breztri with albuterol as needed    Follows had no acute exacerbation of symptoms since I last saw him he feels that he is slowly but progressively got worse  Dyspnea and wheezing have gotten a lot worse    Patient Active Problem List   Diagnosis    CAD (coronary artery disease)    PAD (peripheral artery disease)    Essential hypertension    Mixed hyperlipidemia    Peripheral vascular disease    Edema of right lower extremity    Annual physical exam    Alcohol use    Preoperative examination    Sinus bradycardia, persistent    Postphlebitic syndrome    GERD    Perennial rhinitis    Non-smoker    Eosinophilic granulomatosis with polyangiitis (EGPA)    Bilateral pulmonary infiltrates    Screening PSA (prostate specific antigen)    Medicare annual wellness visit, subsequent    Chronic persistent asthma    Abnormal glucose      No Known Allergies    Current Outpatient Medications:     albuterol sulfate HFA (Ventolin HFA) 108 (90 Base) MCG/ACT inhaler, Inhale 2 puffs Every 4 (Four) Hours As Needed for Wheezing., Disp: 18 g, Rfl: 11    aspirin 325 MG tablet, Take 1 tablet by mouth Daily., Disp: , Rfl:     atorvastatin (LIPITOR) 20 MG tablet, TAKE 1 TABLET BY MOUTH EVERYDAY AT BEDTIME, Disp: 90 tablet, Rfl: 3    Budeson-Glycopyrrol-Formoterol (Breztri Aerosphere) 160-9-4.8 MCG/ACT aerosol inhaler, Inhale 2 puffs 2 (Two) Times a Day., Disp: 3 each, Rfl: 3     "chlorthalidone (HYGROTON) 25 MG tablet, Take 0.5 tablets by mouth Daily., Disp: 45 tablet, Rfl: 3    esomeprazole (nexIUM) 20 MG capsule, Take 1 capsule by mouth Every Morning Before Breakfast., Disp: , Rfl:     ipratropium (ATROVENT) 0.03 % nasal spray, 2 sprays into the nostril(s) as directed by provider Every 12 (Twelve) Hours., Disp: 1 each, Rfl: 12    lisinopril (PRINIVIL,ZESTRIL) 10 MG tablet, TAKE 1 TABLET BY MOUTH EVERY DAY, Disp: 90 tablet, Rfl: 3    Mepolizumab 100 MG/ML solution prefilled syringe, Inject 3 mL under the skin into the appropriate area as directed Every 28 (Twenty-Eight) Days., Disp: 3 mL, Rfl: 11    nebivolol (BYSTOLIC) 10 MG tablet, TAKE 1 TABLET BY MOUTH EVERY DAY, Disp: 90 tablet, Rfl: 3    omeprazole (priLOSEC) 20 MG capsule, Take 1 capsule by mouth Daily., Disp: , Rfl:     Triamcinolone Acetonide (NASACORT) 55 MCG/ACT nasal inhaler, 2 sprays into the nostril(s) as directed by provider 2 (Two) Times a Day., Disp: , Rfl:   MEDICATION LIST AND ALLERGIES REVIEWED.    Family History   Problem Relation Age of Onset    Esophageal cancer Father         cause of death    Cancer Father     Asthma Sister     Asthma Brother      Social History     Tobacco Use    Smoking status: Never     Passive exposure: Never    Smokeless tobacco: Never   Vaping Use    Vaping status: Never Used   Substance Use Topics    Alcohol use: Yes     Alcohol/week: 1.0 standard drink of alcohol     Types: 1 Glasses of wine per week    Drug use: Never     Social History     Social History Narrative        Retired     Lifelong non-smoker    Drinks about 1 alcoholic beverage on a weekly basis     FAMILY AND SOCIAL HISTORY REVIEWED.    Review of Systems  IF PRESENT REFER TO SCANNED ROS SHEET FROM SAME DATE  OTHERWISE ROS OBTAINED AND NON-CONTRIBUTORY OVER HPI.    /70   Pulse 83   Temp 98.2 °F (36.8 °C)   Ht 170.2 cm (67.01\")   Wt 72.6 kg (160 lb)   SpO2 92% Comment: room air at rest  BMI " 25.05 kg/m²   Physical Exam  Vitals and nursing note reviewed.   Constitutional:       General: He is not in acute distress.     Appearance: He is well-developed. He is not diaphoretic.   HENT:      Head: Normocephalic and atraumatic.   Neck:      Thyroid: No thyromegaly.   Cardiovascular:      Rate and Rhythm: Normal rate and regular rhythm.      Heart sounds: Normal heart sounds. No murmur heard.  Pulmonary:      Effort: Pulmonary effort is normal.      Breath sounds: No stridor.   Lymphadenopathy:      Cervical: No cervical adenopathy.      Upper Body:      Right upper body: No supraclavicular or epitrochlear adenopathy.      Left upper body: No supraclavicular or epitrochlear adenopathy.   Skin:     General: Skin is warm and dry.   Neurological:      Mental Status: He is alert and oriented to person, place, and time.   Psychiatric:         Behavior: Behavior normal.         Results     Spirometry today reveals very severe airway obstruction with significant reduction in FEV1 compared to 11/29/2023  Immunization History   Administered Date(s) Administered    COVID-19 (PFIZER) BIVALENT 12+YRS 11/05/2022    COVID-19 (PFIZER) Purple Cap Monovalent 01/11/2021, 02/01/2021, 10/02/2021    COVID-19 F23 (PFIZER) 12YRS+ (COMIRNATY) 10/30/2023    Covid-19 (Pfizer) Gray Cap Monovalent 06/11/2022    FLUAD TRI 65YR+ 10/21/2019, 09/30/2020    Fluad Quad 65+ 10/11/2021    Fluzone High Dose =>65 Years ([facility] ONLY) 10/01/2014, 12/14/2016, 10/10/2017, 11/30/2017, 10/01/2018, 10/21/2019, 09/30/2020    Fluzone High-Dose 65+yrs 09/30/2020, 11/05/2022, 10/19/2023    Fluzone Quad >6mos (Multi-dose) 10/30/2015    Hepatitis A 11/06/2018, 04/06/2019, 05/03/2019    Pneumococcal Conjugate 13-Valent (PCV13) 12/17/2014    Pneumococcal Polysaccharide (PPSV23) 06/25/2013, 11/16/2021    Pneumococcal, Unspecified 06/25/2013    Shingrix 03/18/2020, 05/01/2020, 06/10/2020    Tdap 06/25/2013    Zostavax 06/20/2012     Problem List       ICD-10-CM  ICD-9-CM   1. Chronic persistent asthma  J45.909 493.90   2. Alcohol use  Z78.9 V49.89   3. Eosinophilic granulomatosis with polyangiitis (EGPA)  M30.1 446.4    D72.18    4. Non-smoker  Z78.9 V49.89       Discussion     Unexpectedly his symptoms and airway obstruction have gotten dramatically worse temporally associated with our increase in Nucala dosing.  Seems unlikely but this could be a paradoxical reaction to the Nucala and is confusing    We are just going to stop the Nucala and try to reset his airway obstruction with prolonged steroids.  Will reassess lab work including an ANCA  In addition, get a CT scan of the chest    I will plan to see him back in 6 weeks for follow-up    Level of service justified based on 43 minutes spent in patient care on this date of service including, but not limited to: preparing to see the patient, obtaining and/or reviewing history, performing medically appropriate examination, ordering tests/medicine/procedures, independently interpreting results, documenting clinical information in EHR, and counseling/education of patient/family/caregiver (excluding time spent on other separate services such as performing procedures or test interpretation, if applicable). (Level 4 30-39 minutes; Level 5 40-54 minutes)    Jaspreet Quiroz MD  Note electronically signed    CC: Simon Easley MD

## 2024-05-16 DIAGNOSIS — D72.18 EOSINOPHILIC GRANULOMATOSIS WITH POLYANGIITIS (EGPA): ICD-10-CM

## 2024-05-16 DIAGNOSIS — M30.1 EOSINOPHILIC GRANULOMATOSIS WITH POLYANGIITIS (EGPA): ICD-10-CM

## 2024-05-16 DIAGNOSIS — R91.8 BILATERAL PULMONARY INFILTRATES ON CHEST X-RAY: Primary | ICD-10-CM

## 2024-05-16 LAB
ALBUMIN SERPL-MCNC: 3.4 G/DL (ref 3.5–5.2)
ALBUMIN/GLOB SERPL: 1 G/DL
ALP SERPL-CCNC: 66 U/L (ref 39–117)
ALT SERPL W P-5'-P-CCNC: 15 U/L (ref 1–41)
ANION GAP SERPL CALCULATED.3IONS-SCNC: 12.3 MMOL/L (ref 5–15)
AST SERPL-CCNC: 17 U/L (ref 1–40)
BILIRUB SERPL-MCNC: 0.8 MG/DL (ref 0–1.2)
BUN SERPL-MCNC: 16 MG/DL (ref 8–23)
BUN/CREAT SERPL: 15.1 (ref 7–25)
CALCIUM SPEC-SCNC: 9.1 MG/DL (ref 8.6–10.5)
CHLORIDE SERPL-SCNC: 101 MMOL/L (ref 98–107)
CO2 SERPL-SCNC: 26.7 MMOL/L (ref 22–29)
CREAT SERPL-MCNC: 1.06 MG/DL (ref 0.76–1.27)
EGFRCR SERPLBLD CKD-EPI 2021: 71.8 ML/MIN/1.73
GLOBULIN UR ELPH-MCNC: 3.3 GM/DL
GLUCOSE SERPL-MCNC: 111 MG/DL (ref 65–99)
POTASSIUM SERPL-SCNC: 3.5 MMOL/L (ref 3.5–5.2)
PROT SERPL-MCNC: 6.7 G/DL (ref 6–8.5)
SODIUM SERPL-SCNC: 140 MMOL/L (ref 136–145)

## 2024-05-17 LAB
ACE SERPL-CCNC: <15 U/L (ref 14–82)
C-ANCA TITR SER IF: NORMAL TITER
MYELOPEROXIDASE AB SER IA-ACNC: <0.2 UNITS (ref 0–0.9)
P-ANCA ATYPICAL TITR SER IF: NORMAL TITER
P-ANCA TITR SER IF: NORMAL TITER
PROTEINASE3 AB SER IA-ACNC: <0.2 UNITS (ref 0–0.9)

## 2024-05-20 LAB
A ALTERNATA IGE QN: 9.23 KU/L
A FUMIGATUS IGE QN: 14.2 KU/L
AMER ROACH IGE QN: <0.1 KU/L
BAHIA GRASS IGE QN: <0.1 KU/L
BERMUDA GRASS IGE QN: 0.1 KU/L
BOXELDER IGE QN: 0.12 KU/L
C HERBARUM IGE QN: 0.91 KU/L
CAT DANDER IGE QN: <0.1 KU/L
CMN PIGWEED IGE QN: 0.13 KU/L
COMMON RAGWEED IGE QN: 0.59 KU/L
CONV CLASS DESCRIPTION: ABNORMAL
D FARINAE IGE QN: 0.33 KU/L
D PTERONYSS IGE QN: 0.2 KU/L
DOG DANDER IGE QN: <0.1 KU/L
ENGL PLANTAIN IGE QN: <0.1 KU/L
HAZELNUT POLN IGE QN: <0.1 KU/L
IGE SERPL-ACNC: 436 IU/ML (ref 6–495)
JOHNSON GRASS IGE QN: <0.1 KU/L
KENT BLUE GRASS IGE QN: <0.1 KU/L
LONDON PLANE IGE QN: <0.1 KU/L
M RACEMOSUS IGE QN: 0.47 KU/L
MT JUNIPER IGE QN: 0.19 KU/L
MUGWORT IGE QN: 0.11 KU/L
NETTLE IGE QN: 0.2 KU/L
P NOTATUM IGE QN: 4.48 KU/L
S BOTRYOSUM IGE QN: 2.73 KU/L
SHEEP SORREL IGE QN: <0.1 KU/L
SWEET GUM IGE QN: 0.11 KU/L
WHITE ELM IGE QN: 0.16 KU/L
WHITE HICKORY IGE QN: 1.6 KU/L
WHITE MULBERRY IGE QN: <0.1 KU/L
WHITE OAK IGE QN: <0.1 KU/L

## 2024-05-22 LAB
A FUMIGATUS IGG SER QL: NEGATIVE
A PULLULANS IGG SER QL: NEGATIVE
LACEYELLA SACCHARI AB SER QL ID: NEGATIVE
PIGEON SERUM IGG QL: NEGATIVE
S RECTIVIRGULA IGG SER QL ID: NEGATIVE
T VULGARIS IGG SER QL: NEGATIVE

## 2024-05-23 ENCOUNTER — APPOINTMENT (OUTPATIENT)
Dept: ONCOLOGY | Facility: HOSPITAL | Age: 79
End: 2024-05-23
Payer: MEDICARE

## 2024-05-23 ENCOUNTER — HOSPITAL ENCOUNTER (OUTPATIENT)
Dept: CT IMAGING | Facility: HOSPITAL | Age: 79
Discharge: HOME | End: 2024-05-23
Admitting: INTERNAL MEDICINE
Payer: MEDICARE

## 2024-05-23 DIAGNOSIS — D72.18 EOSINOPHILIC GRANULOMATOSIS WITH POLYANGIITIS (EGPA): ICD-10-CM

## 2024-05-23 DIAGNOSIS — M30.1 EOSINOPHILIC GRANULOMATOSIS WITH POLYANGIITIS (EGPA): ICD-10-CM

## 2024-05-23 DIAGNOSIS — R91.8 BILATERAL PULMONARY INFILTRATES ON CHEST X-RAY: ICD-10-CM

## 2024-05-23 PROCEDURE — 71250 CT THORAX DX C-: CPT

## 2024-05-27 LAB
A1AT PHENOTYP SERPL IFE: ABNORMAL
A1AT SERPL-MCNC: 200 MG/DL (ref 101–187)

## 2024-05-29 ENCOUNTER — OFFICE VISIT (OUTPATIENT)
Dept: INTERNAL MEDICINE | Facility: CLINIC | Age: 79
End: 2024-05-29
Payer: MEDICARE

## 2024-05-29 ENCOUNTER — LAB (OUTPATIENT)
Dept: LAB | Facility: HOSPITAL | Age: 79
End: 2024-05-29
Payer: MEDICARE

## 2024-05-29 ENCOUNTER — DOCUMENTATION (OUTPATIENT)
Dept: PULMONOLOGY | Facility: CLINIC | Age: 79
End: 2024-05-29
Payer: MEDICARE

## 2024-05-29 VITALS
BODY MASS INDEX: 25.58 KG/M2 | OXYGEN SATURATION: 95 % | TEMPERATURE: 98.2 F | HEIGHT: 67 IN | SYSTOLIC BLOOD PRESSURE: 140 MMHG | DIASTOLIC BLOOD PRESSURE: 70 MMHG | WEIGHT: 163 LBS | HEART RATE: 69 BPM

## 2024-05-29 DIAGNOSIS — R73.09 ABNORMAL GLUCOSE: ICD-10-CM

## 2024-05-29 DIAGNOSIS — E78.2 MIXED HYPERLIPIDEMIA: ICD-10-CM

## 2024-05-29 DIAGNOSIS — I10 ESSENTIAL HYPERTENSION: Primary | ICD-10-CM

## 2024-05-29 LAB
CHOLEST SERPL-MCNC: 156 MG/DL (ref 0–200)
HBA1C MFR BLD: 7 % (ref 4.8–5.6)
HDLC SERPL-MCNC: 87 MG/DL (ref 40–60)
LDLC SERPL CALC-MCNC: 59 MG/DL (ref 0–100)
LDLC/HDLC SERPL: 0.69 {RATIO}
TRIGL SERPL-MCNC: 43 MG/DL (ref 0–150)
VLDLC SERPL-MCNC: 10 MG/DL (ref 5–40)

## 2024-05-29 PROCEDURE — G2211 COMPLEX E/M VISIT ADD ON: HCPCS | Performed by: INTERNAL MEDICINE

## 2024-05-29 PROCEDURE — 83036 HEMOGLOBIN GLYCOSYLATED A1C: CPT

## 2024-05-29 PROCEDURE — 3077F SYST BP >= 140 MM HG: CPT | Performed by: INTERNAL MEDICINE

## 2024-05-29 PROCEDURE — 3078F DIAST BP <80 MM HG: CPT | Performed by: INTERNAL MEDICINE

## 2024-05-29 PROCEDURE — 80061 LIPID PANEL: CPT

## 2024-05-29 PROCEDURE — 99214 OFFICE O/P EST MOD 30 MIN: CPT | Performed by: INTERNAL MEDICINE

## 2024-05-29 PROCEDURE — 1126F AMNT PAIN NOTED NONE PRSNT: CPT | Performed by: INTERNAL MEDICINE

## 2024-05-29 PROCEDURE — 1160F RVW MEDS BY RX/DR IN RCRD: CPT | Performed by: INTERNAL MEDICINE

## 2024-05-29 PROCEDURE — 1159F MED LIST DOCD IN RCRD: CPT | Performed by: INTERNAL MEDICINE

## 2024-05-29 NOTE — PROGRESS NOTES
The patient underwent a CT scan of the chest which I reviewed on PACS.  Stable with no new or progressive findings    I have reviewed his lab work as well.  The cystic fibrosis mutation panel is still pending.    I have discussed the results with the patient on the phone.    Of interest is that his Aspergillus IgE was quite high.  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  At this point we would need to get an IgG level and his eosinophil counts not high but he has been on the Nucala and is now on the prednisone so I am not sure that we are ever going to get an accurate eosinophil count.    The drug of choice is going to be steroids which we have put him on.  We could consider another biologic agent, such as Xolair but unfortunately we do not have much experience with anything other than the steroids and possible addition of antifungal therapy if we can taper him off of the oral corticosteroids.    Will discuss it further when he comes back to the office next month

## 2024-05-29 NOTE — PROGRESS NOTES
[facility]     Uzair Jacob  1945   [id number]      Patient Care Team:  Simon Easley MD as PCP - General  Vic Blas MD as Consulting Physician (Cardiology)  Sukhdev Sun MD (Dermatology)  Jaspreet Quiroz MD as Emergency Attending (Pulmonary Disease)  Joey Elizalde MD as Consulting Physician (Ophthalmology)  Jaspreet Quiroz MD as Emergency Attending (Pulmonary Disease)    Chief Complaint::   Chief Complaint   Patient presents with    Hypertension        HPI  History of Present Illness  The patient is a 77-year-old male who comes in for follow-up of hypertension, hyperlipidemia, and abnormal glucose. He continues to follow up with pulmonary for chronic asthma and chronic pulmonary infiltrates and with cardiology for cardiovascular disease.    In 02/2024, Dr. Quiroz recommended an increase in his Nucala dosage from 100 mg to 3 injections. However, the patient experienced an exacerbation of his respiratory symptoms, including low-grade fever, 2 to 3 days post-injection. Consequently, Dr. Quiroz recommended a 4 to 6-month trial of Nucala. In 04/2024, the patient opted not to continue Nucala injections, prompting a recent consultation with Dr. Quiroz. The decision was made to temporarily discontinue Nucala injections and prescribe prednisone. The patient has a long-standing history of asthma. His next appointment with Dr. Quiroz is scheduled for 08/2024. Recent blood tests by Dr. Quiroz did not reveal any significant findings, but a CAT scan was ordered to investigate potential underlying diseases. The patient's CAT scan revealed signs of infection and swelling. He reports that prednisone has effectively reduced his heart rate and improved his oxygen saturation.      Chronic Conditions:      Patient Active Problem List   Diagnosis    CAD (coronary artery disease)    PAD (peripheral artery disease)    Essential hypertension    Mixed  hyperlipidemia    Peripheral vascular disease    Edema of right lower extremity    Annual physical exam    Alcohol use    Preoperative examination    Sinus bradycardia, persistent    Postphlebitic syndrome    GERD    Perennial rhinitis    Non-smoker    Eosinophilic granulomatosis with polyangiitis (EGPA)    Bilateral pulmonary infiltrates    Screening PSA (prostate specific antigen)    Medicare annual wellness visit, subsequent    Chronic persistent asthma    Abnormal glucose        Past Medical History:   Diagnosis Date    Allergic     Allergic rhinitis     Asthma 03/08/2017    CAD (coronary artery disease) 03/08/2017    Chest pain     stent to LAD 2012    Chronic persistent asthma 12/04/2020    COPD (chronic obstructive pulmonary disease)     Deep vein thrombosis (DVT) of right lower extremity 04/18/2019    GERD (gastroesophageal reflux disease) 03/08/2017    Hyperlipidemia 03/08/2017    Hypertension 03/08/2017    Nasal polyps     nasal polypectomy 1996    PAD (peripheral artery disease) 03/08/2017    Pneumonia 1950       Past Surgical History:   Procedure Laterality Date    CARDIAC CATHETERIZATION  01/01/2018    CORONARY ANGIOPLASTY WITH STENT PLACEMENT  03/2012    Chest pain; stent to LAD    NASAL POLYP SURGERY  1996    Nasal polyps    POLYPECTOMY      Sinus polyp extraction.    TONSILLECTOMY         Family History   Problem Relation Age of Onset    Esophageal cancer Father         cause of death    Cancer Father     Asthma Sister     Asthma Brother        Social History     Socioeconomic History    Marital status:    Tobacco Use    Smoking status: Never     Passive exposure: Never    Smokeless tobacco: Never   Vaping Use    Vaping status: Never Used   Substance and Sexual Activity    Alcohol use: Yes     Alcohol/week: 1.0 standard drink of alcohol     Types: 1 Glasses of wine per week    Drug use: Never    Sexual activity: Not Currently     Partners: Female     Birth control/protection: Spermicide, Tubal  ligation       No Known Allergies      Current Outpatient Medications:     albuterol sulfate HFA (Ventolin HFA) 108 (90 Base) MCG/ACT inhaler, Inhale 2 puffs Every 4 (Four) Hours As Needed for Wheezing., Disp: 18 g, Rfl: 11    aspirin 325 MG tablet, Take 1 tablet by mouth Daily., Disp: , Rfl:     atorvastatin (LIPITOR) 20 MG tablet, TAKE 1 TABLET BY MOUTH EVERYDAY AT BEDTIME, Disp: 90 tablet, Rfl: 3    Budeson-Glycopyrrol-Formoterol (Breztri Aerosphere) 160-9-4.8 MCG/ACT aerosol inhaler, Inhale 2 puffs 2 (Two) Times a Day., Disp: 3 each, Rfl: 3    chlorthalidone (HYGROTON) 25 MG tablet, Take 0.5 tablets by mouth Daily., Disp: 45 tablet, Rfl: 3    esomeprazole (nexIUM) 20 MG capsule, Take 1 capsule by mouth Every Morning Before Breakfast., Disp: , Rfl:     ipratropium (ATROVENT) 0.03 % nasal spray, 2 sprays into the nostril(s) as directed by provider Every 12 (Twelve) Hours., Disp: 1 each, Rfl: 12    lisinopril (PRINIVIL,ZESTRIL) 10 MG tablet, TAKE 1 TABLET BY MOUTH EVERY DAY, Disp: 90 tablet, Rfl: 3    nebivolol (BYSTOLIC) 10 MG tablet, TAKE 1 TABLET BY MOUTH EVERY DAY, Disp: 90 tablet, Rfl: 3    omeprazole (priLOSEC) 20 MG capsule, Take 1 capsule by mouth Daily., Disp: , Rfl:     predniSONE (DELTASONE) 10 MG tablet, Take 4 tabs daily x 2 weeks, then take 3 tabs daily x 2 weeks, then take 2 tabs daily x 2 weeks, then take 1 tab daily  until return to the office, Disp: 120 tablet, Rfl: 2    Triamcinolone Acetonide (NASACORT) 55 MCG/ACT nasal inhaler, 2 sprays into the nostril(s) as directed by provider 2 (Two) Times a Day., Disp: , Rfl:     Review of Systems   Constitutional: Negative.    Respiratory: Negative.  Negative for chest tightness and shortness of breath.    Cardiovascular: Negative.  Negative for chest pain.   Gastrointestinal:  Negative for abdominal pain, blood in stool, constipation and diarrhea.        Vital Signs  Vitals:    05/29/24 0838   BP: 140/70   BP Location: Left arm   Patient Position:  "Sitting   Cuff Size: Adult   Pulse: 69   Temp: 98.2 °F (36.8 °C)   TempSrc: Infrared   SpO2: 95%   Weight: 73.9 kg (163 lb)   Height: 170.2 cm (67.01\")   PainSc: 0-No pain       Physical Exam  Vitals reviewed.   Constitutional:       Appearance: He is well-developed.   HENT:      Head: Normocephalic and atraumatic.   Neck:      Thyroid: No thyromegaly.      Vascular: No carotid bruit.   Cardiovascular:      Rate and Rhythm: Normal rate and regular rhythm.      Heart sounds: Normal heart sounds. No murmur heard.     No friction rub. No gallop.   Pulmonary:      Effort: Pulmonary effort is normal.      Breath sounds: Normal breath sounds.   Musculoskeletal:      Cervical back: Normal range of motion and neck supple.      Right lower leg: No edema.      Left lower leg: No edema.   Lymphadenopathy:      Cervical: No cervical adenopathy.   Skin:     General: Skin is warm and dry.   Neurological:      Mental Status: He is oriented to person, place, and time.      Cranial Nerves: No cranial nerve deficit.   Psychiatric:         Mood and Affect: Mood normal.         Behavior: Behavior normal.        Physical Exam  Lungs are clear.    Procedures    ACE III MINI        Results  Imaging  CAT scan showed some signs of infection and swelling from infection.           Assessment/Plan:    Diagnoses and all orders for this visit:    1. Essential hypertension (Primary)    2. Mixed hyperlipidemia  -     Lipid Panel; Future    3. Abnormal glucose  -     Hemoglobin A1c; Future      Assessment & Plan  1. Hypertension.  The patient's blood pressure remains effectively managed with the current regimen of chlorthalidone, lisinopril, and nebivolol.    2. Hyperlipidemia.  A lipid panel results are pending. The patient is advised to continue with the atorvastatin regimen and a healthy diet.    3. Abnormal glucose.  The patient's A1c results are pending. This could potentially be elevated due to the patient's recent use of prednisone. The " patient's efforts to maintain a healthy diet will be continued.    4. Chronic asthma and chronic pulmonary infiltrates.  The patient's primary concern remains his pulmonary issues, for which he is under the care of a pulmonologist.    Follow-up  The patient is scheduled for a follow-up visit in 6 months.      Plan of care reviewed with patient at the conclusion of today's visit. Education was provided regarding diagnosis, management, and any prescribed or recommended OTC medications.Patient verbalizes understanding of and agreement with management plan.     Patient or patient representative verbalized consent for the use of Ambient Listening during the visit with  Simon Easley MD for chart documentation. 5/31/2024  14:37 EDT        Simon Easley MD

## 2024-05-31 LAB — CFTR MUT ANL BLD/T: NORMAL

## 2024-06-19 RX ORDER — ATORVASTATIN CALCIUM 20 MG/1
20 TABLET, FILM COATED ORAL
Qty: 90 TABLET | Refills: 3 | Status: SHIPPED | OUTPATIENT
Start: 2024-06-19

## 2024-06-26 ENCOUNTER — APPOINTMENT (OUTPATIENT)
Dept: GENERAL RADIOLOGY | Facility: HOSPITAL | Age: 79
End: 2024-06-26
Payer: MEDICARE

## 2024-06-26 ENCOUNTER — HOSPITAL ENCOUNTER (OUTPATIENT)
Facility: HOSPITAL | Age: 79
Setting detail: OBSERVATION
Discharge: HOME | End: 2024-06-28
Attending: EMERGENCY MEDICINE | Admitting: INTERNAL MEDICINE
Payer: MEDICARE

## 2024-06-26 DIAGNOSIS — N17.9 ACUTE KIDNEY INJURY: ICD-10-CM

## 2024-06-26 DIAGNOSIS — J96.21 ACUTE ON CHRONIC RESPIRATORY FAILURE WITH HYPOXIA: Primary | ICD-10-CM

## 2024-06-26 DIAGNOSIS — J44.1 COPD EXACERBATION: ICD-10-CM

## 2024-06-26 DIAGNOSIS — J96.01 ACUTE RESPIRATORY FAILURE WITH HYPOXIA: ICD-10-CM

## 2024-06-26 LAB
ALBUMIN SERPL-MCNC: 3 G/DL (ref 3.5–5.2)
ALBUMIN/GLOB SERPL: 0.8 G/DL
ALP SERPL-CCNC: 59 U/L (ref 39–117)
ALT SERPL W P-5'-P-CCNC: 28 U/L (ref 1–41)
ANION GAP SERPL CALCULATED.3IONS-SCNC: 9 MMOL/L (ref 5–15)
ARTERIAL PATENCY WRIST A: POSITIVE
AST SERPL-CCNC: 23 U/L (ref 1–40)
ATMOSPHERIC PRESS: ABNORMAL MM[HG]
B PARAPERT DNA SPEC QL NAA+PROBE: NOT DETECTED
B PERT DNA SPEC QL NAA+PROBE: NOT DETECTED
BASE EXCESS BLDA CALC-SCNC: 5.7 MMOL/L (ref 0–2)
BASOPHILS # BLD AUTO: 0.04 10*3/MM3 (ref 0–0.2)
BASOPHILS NFR BLD AUTO: 0.4 % (ref 0–1.5)
BDY SITE: ABNORMAL
BILIRUB SERPL-MCNC: 1.5 MG/DL (ref 0–1.2)
BODY TEMPERATURE: 37
BUN SERPL-MCNC: 36 MG/DL (ref 8–23)
BUN/CREAT SERPL: 27.1 (ref 7–25)
C PNEUM DNA NPH QL NAA+NON-PROBE: NOT DETECTED
CALCIUM SPEC-SCNC: 8.7 MG/DL (ref 8.6–10.5)
CHLORIDE SERPL-SCNC: 96 MMOL/L (ref 98–107)
CO2 BLDA-SCNC: 31.8 MMOL/L (ref 22–33)
CO2 SERPL-SCNC: 32 MMOL/L (ref 22–29)
COHGB MFR BLD: 1.4 % (ref 0–2)
CREAT SERPL-MCNC: 1.33 MG/DL (ref 0.76–1.27)
DEPRECATED RDW RBC AUTO: 45.7 FL (ref 37–54)
EGFRCR SERPLBLD CKD-EPI 2021: 54.7 ML/MIN/1.73
EOSINOPHIL # BLD AUTO: 0 10*3/MM3 (ref 0–0.4)
EOSINOPHIL NFR BLD AUTO: 0 % (ref 0.3–6.2)
EPAP: 0
ERYTHROCYTE [DISTWIDTH] IN BLOOD BY AUTOMATED COUNT: 14.3 % (ref 12.3–15.4)
FLUAV SUBTYP SPEC NAA+PROBE: NOT DETECTED
FLUBV RNA ISLT QL NAA+PROBE: NOT DETECTED
GLOBULIN UR ELPH-MCNC: 3.7 GM/DL
GLUCOSE SERPL-MCNC: 173 MG/DL (ref 65–99)
HADV DNA SPEC NAA+PROBE: NOT DETECTED
HCO3 BLDA-SCNC: 30.4 MMOL/L (ref 20–26)
HCOV 229E RNA SPEC QL NAA+PROBE: NOT DETECTED
HCOV HKU1 RNA SPEC QL NAA+PROBE: NOT DETECTED
HCOV NL63 RNA SPEC QL NAA+PROBE: NOT DETECTED
HCOV OC43 RNA SPEC QL NAA+PROBE: NOT DETECTED
HCT VFR BLD AUTO: 46.3 % (ref 37.5–51)
HCT VFR BLD CALC: 48.4 % (ref 38–51)
HGB BLD-MCNC: 15.3 G/DL (ref 13–17.7)
HGB BLDA-MCNC: 15.8 G/DL (ref 13.5–17.5)
HMPV RNA NPH QL NAA+NON-PROBE: NOT DETECTED
HOLD SPECIMEN: NORMAL
HPIV1 RNA ISLT QL NAA+PROBE: NOT DETECTED
HPIV2 RNA SPEC QL NAA+PROBE: NOT DETECTED
HPIV3 RNA NPH QL NAA+PROBE: NOT DETECTED
HPIV4 P GENE NPH QL NAA+PROBE: NOT DETECTED
IMM GRANULOCYTES # BLD AUTO: 0.08 10*3/MM3 (ref 0–0.05)
IMM GRANULOCYTES NFR BLD AUTO: 0.8 % (ref 0–0.5)
INHALED O2 CONCENTRATION: 40 %
IPAP: 0
LYMPHOCYTES # BLD AUTO: 0.44 10*3/MM3 (ref 0.7–3.1)
LYMPHOCYTES NFR BLD AUTO: 4.4 % (ref 19.6–45.3)
M PNEUMO IGG SER IA-ACNC: NOT DETECTED
MAGNESIUM SERPL-MCNC: 1.7 MG/DL (ref 1.6–2.4)
MCH RBC QN AUTO: 28.8 PG (ref 26.6–33)
MCHC RBC AUTO-ENTMCNC: 33 G/DL (ref 31.5–35.7)
MCV RBC AUTO: 87 FL (ref 79–97)
METHGB BLD QL: 0.1 % (ref 0–1.5)
MODALITY: ABNORMAL
MONOCYTES # BLD AUTO: 0.63 10*3/MM3 (ref 0.1–0.9)
MONOCYTES NFR BLD AUTO: 6.3 % (ref 5–12)
NEUTROPHILS NFR BLD AUTO: 8.88 10*3/MM3 (ref 1.7–7)
NEUTROPHILS NFR BLD AUTO: 88.1 % (ref 42.7–76)
NRBC BLD AUTO-RTO: 0 /100 WBC (ref 0–0.2)
NT-PROBNP SERPL-MCNC: 600.4 PG/ML (ref 0–1800)
OXYHGB MFR BLDV: 97.3 % (ref 94–99)
PAW @ PEAK INSP FLOW SETTING VENT: 0 CMH2O
PCO2 BLDA: 43.1 MM HG (ref 35–45)
PCO2 TEMP ADJ BLD: 43.1 MM HG (ref 35–48)
PH BLDA: 7.46 PH UNITS (ref 7.35–7.45)
PH, TEMP CORRECTED: 7.46 PH UNITS
PLATELET # BLD AUTO: 219 10*3/MM3 (ref 140–450)
PMV BLD AUTO: 9.1 FL (ref 6–12)
PO2 BLDA: 114 MM HG (ref 83–108)
PO2 TEMP ADJ BLD: 114 MM HG (ref 83–108)
POTASSIUM SERPL-SCNC: 4.5 MMOL/L (ref 3.5–5.2)
PROT SERPL-MCNC: 6.7 G/DL (ref 6–8.5)
RBC # BLD AUTO: 5.32 10*6/MM3 (ref 4.14–5.8)
RHINOVIRUS RNA SPEC NAA+PROBE: NOT DETECTED
RSV RNA NPH QL NAA+NON-PROBE: NOT DETECTED
SARS-COV-2 RNA NPH QL NAA+NON-PROBE: NOT DETECTED
SODIUM SERPL-SCNC: 137 MMOL/L (ref 136–145)
TOTAL RATE: 0 BREATHS/MINUTE
TROPONIN T SERPL HS-MCNC: 37 NG/L
TROPONIN T SERPL HS-MCNC: 41 NG/L
WBC NRBC COR # BLD AUTO: 10.07 10*3/MM3 (ref 3.4–10.8)
WHOLE BLOOD HOLD COAG: NORMAL
WHOLE BLOOD HOLD SPECIMEN: NORMAL

## 2024-06-26 PROCEDURE — 83880 ASSAY OF NATRIURETIC PEPTIDE: CPT | Performed by: EMERGENCY MEDICINE

## 2024-06-26 PROCEDURE — 94640 AIRWAY INHALATION TREATMENT: CPT

## 2024-06-26 PROCEDURE — 71045 X-RAY EXAM CHEST 1 VIEW: CPT

## 2024-06-26 PROCEDURE — 93005 ELECTROCARDIOGRAM TRACING: CPT

## 2024-06-26 PROCEDURE — 94799 UNLISTED PULMONARY SVC/PX: CPT

## 2024-06-26 PROCEDURE — 96374 THER/PROPH/DIAG INJ IV PUSH: CPT

## 2024-06-26 PROCEDURE — 85025 COMPLETE CBC W/AUTO DIFF WBC: CPT | Performed by: EMERGENCY MEDICINE

## 2024-06-26 PROCEDURE — 25010000002 HEPARIN (PORCINE) PER 1000 UNITS: Performed by: NURSE PRACTITIONER

## 2024-06-26 PROCEDURE — 93005 ELECTROCARDIOGRAM TRACING: CPT | Performed by: EMERGENCY MEDICINE

## 2024-06-26 PROCEDURE — 25010000002 METHYLPREDNISOLONE PER 125 MG: Performed by: EMERGENCY MEDICINE

## 2024-06-26 PROCEDURE — G0378 HOSPITAL OBSERVATION PER HR: HCPCS

## 2024-06-26 PROCEDURE — 0202U NFCT DS 22 TRGT SARS-COV-2: CPT | Performed by: EMERGENCY MEDICINE

## 2024-06-26 PROCEDURE — 99222 1ST HOSP IP/OBS MODERATE 55: CPT | Performed by: NURSE PRACTITIONER

## 2024-06-26 PROCEDURE — 80053 COMPREHEN METABOLIC PANEL: CPT | Performed by: EMERGENCY MEDICINE

## 2024-06-26 PROCEDURE — 36600 WITHDRAWAL OF ARTERIAL BLOOD: CPT

## 2024-06-26 PROCEDURE — 82375 ASSAY CARBOXYHB QUANT: CPT

## 2024-06-26 PROCEDURE — 83050 HGB METHEMOGLOBIN QUAN: CPT

## 2024-06-26 PROCEDURE — 82805 BLOOD GASES W/O2 SATURATION: CPT

## 2024-06-26 PROCEDURE — 84484 ASSAY OF TROPONIN QUANT: CPT | Performed by: NURSE PRACTITIONER

## 2024-06-26 PROCEDURE — 36415 COLL VENOUS BLD VENIPUNCTURE: CPT

## 2024-06-26 PROCEDURE — 99285 EMERGENCY DEPT VISIT HI MDM: CPT

## 2024-06-26 PROCEDURE — 99291 CRITICAL CARE FIRST HOUR: CPT

## 2024-06-26 PROCEDURE — 96372 THER/PROPH/DIAG INJ SC/IM: CPT

## 2024-06-26 PROCEDURE — 84484 ASSAY OF TROPONIN QUANT: CPT | Performed by: EMERGENCY MEDICINE

## 2024-06-26 PROCEDURE — 83735 ASSAY OF MAGNESIUM: CPT | Performed by: NURSE PRACTITIONER

## 2024-06-26 RX ORDER — ACETAMINOPHEN 325 MG/1
650 TABLET ORAL EVERY 4 HOURS PRN
Status: DISCONTINUED | OUTPATIENT
Start: 2024-06-26 | End: 2024-06-28 | Stop reason: HOSPADM

## 2024-06-26 RX ORDER — CHLORTHALIDONE 25 MG/1
12.5 TABLET ORAL DAILY
Status: DISCONTINUED | OUTPATIENT
Start: 2024-06-27 | End: 2024-06-28 | Stop reason: HOSPADM

## 2024-06-26 RX ORDER — IPRATROPIUM BROMIDE AND ALBUTEROL SULFATE 2.5; .5 MG/3ML; MG/3ML
3 SOLUTION RESPIRATORY (INHALATION) ONCE
Status: COMPLETED | OUTPATIENT
Start: 2024-06-26 | End: 2024-06-26

## 2024-06-26 RX ORDER — LISINOPRIL 10 MG/1
10 TABLET ORAL DAILY
Status: DISCONTINUED | OUTPATIENT
Start: 2024-06-27 | End: 2024-06-28 | Stop reason: HOSPADM

## 2024-06-26 RX ORDER — IPRATROPIUM BROMIDE AND ALBUTEROL SULFATE 2.5; .5 MG/3ML; MG/3ML
3 SOLUTION RESPIRATORY (INHALATION)
Status: DISCONTINUED | OUTPATIENT
Start: 2024-06-27 | End: 2024-06-28 | Stop reason: HOSPADM

## 2024-06-26 RX ORDER — IPRATROPIUM BROMIDE AND ALBUTEROL SULFATE 2.5; .5 MG/3ML; MG/3ML
3 SOLUTION RESPIRATORY (INHALATION) EVERY 4 HOURS PRN
Status: DISCONTINUED | OUTPATIENT
Start: 2024-06-26 | End: 2024-06-28 | Stop reason: HOSPADM

## 2024-06-26 RX ORDER — ATORVASTATIN CALCIUM 20 MG/1
20 TABLET, FILM COATED ORAL NIGHTLY
Status: DISCONTINUED | OUTPATIENT
Start: 2024-06-27 | End: 2024-06-28 | Stop reason: HOSPADM

## 2024-06-26 RX ORDER — SODIUM CHLORIDE 9 MG/ML
40 INJECTION, SOLUTION INTRAVENOUS AS NEEDED
Status: DISCONTINUED | OUTPATIENT
Start: 2024-06-26 | End: 2024-06-28 | Stop reason: HOSPADM

## 2024-06-26 RX ORDER — NEBIVOLOL 10 MG/1
10 TABLET ORAL DAILY
Status: DISCONTINUED | OUTPATIENT
Start: 2024-06-27 | End: 2024-06-28 | Stop reason: HOSPADM

## 2024-06-26 RX ORDER — ASPIRIN 325 MG
325 TABLET ORAL DAILY
Status: DISCONTINUED | OUTPATIENT
Start: 2024-06-27 | End: 2024-06-28

## 2024-06-26 RX ORDER — HEPARIN SODIUM 5000 [USP'U]/ML
5000 INJECTION, SOLUTION INTRAVENOUS; SUBCUTANEOUS EVERY 8 HOURS SCHEDULED
Status: DISCONTINUED | OUTPATIENT
Start: 2024-06-26 | End: 2024-06-27

## 2024-06-26 RX ORDER — DOXYCYCLINE 100 MG/1
100 CAPSULE ORAL 2 TIMES DAILY
Qty: 10 CAPSULE | Refills: 0 | Status: DISCONTINUED | OUTPATIENT
Start: 2024-06-26 | End: 2024-06-28 | Stop reason: HOSPADM

## 2024-06-26 RX ORDER — PANTOPRAZOLE SODIUM 40 MG/1
40 TABLET, DELAYED RELEASE ORAL
Status: DISCONTINUED | OUTPATIENT
Start: 2024-06-27 | End: 2024-06-28 | Stop reason: HOSPADM

## 2024-06-26 RX ORDER — SODIUM CHLORIDE 0.9 % (FLUSH) 0.9 %
10 SYRINGE (ML) INJECTION AS NEEDED
Status: DISCONTINUED | OUTPATIENT
Start: 2024-06-26 | End: 2024-06-28 | Stop reason: HOSPADM

## 2024-06-26 RX ORDER — METHYLPREDNISOLONE SODIUM SUCCINATE 40 MG/ML
40 INJECTION, POWDER, LYOPHILIZED, FOR SOLUTION INTRAMUSCULAR; INTRAVENOUS EVERY 8 HOURS
Status: DISCONTINUED | OUTPATIENT
Start: 2024-06-27 | End: 2024-06-27

## 2024-06-26 RX ORDER — METHYLPREDNISOLONE SODIUM SUCCINATE 125 MG/2ML
125 INJECTION, POWDER, LYOPHILIZED, FOR SOLUTION INTRAMUSCULAR; INTRAVENOUS ONCE
Status: COMPLETED | OUTPATIENT
Start: 2024-06-26 | End: 2024-06-26

## 2024-06-26 RX ORDER — SODIUM CHLORIDE 0.9 % (FLUSH) 0.9 %
10 SYRINGE (ML) INJECTION EVERY 12 HOURS SCHEDULED
Status: DISCONTINUED | OUTPATIENT
Start: 2024-06-26 | End: 2024-06-28 | Stop reason: HOSPADM

## 2024-06-26 RX ORDER — IPRATROPIUM BROMIDE 21 UG/1
2 SPRAY, METERED NASAL EVERY 12 HOURS
Status: DISCONTINUED | OUTPATIENT
Start: 2024-06-26 | End: 2024-06-28 | Stop reason: HOSPADM

## 2024-06-26 RX ADMIN — IPRATROPIUM BROMIDE AND ALBUTEROL SULFATE 3 ML: 2.5; .5 SOLUTION RESPIRATORY (INHALATION) at 19:00

## 2024-06-26 RX ADMIN — IPRATROPIUM BROMIDE 2 SPRAY: 21 SPRAY, METERED NASAL at 23:34

## 2024-06-26 RX ADMIN — METHYLPREDNISOLONE SODIUM SUCCINATE 125 MG: 125 INJECTION, POWDER, FOR SOLUTION INTRAMUSCULAR; INTRAVENOUS at 17:59

## 2024-06-26 RX ADMIN — DOXYCYCLINE 100 MG: 100 CAPSULE ORAL at 23:24

## 2024-06-26 RX ADMIN — HEPARIN SODIUM 5000 UNITS: 5000 INJECTION INTRAVENOUS; SUBCUTANEOUS at 23:23

## 2024-06-26 RX ADMIN — IPRATROPIUM BROMIDE AND ALBUTEROL SULFATE 3 ML: 2.5; .5 SOLUTION RESPIRATORY (INHALATION) at 18:03

## 2024-06-26 RX ADMIN — Medication 10 ML: at 23:24

## 2024-06-26 NOTE — H&P
[facility]  HISTORY AND PHYSICAL    Patient Name: Uzair Jacob  : 1945  MRN: [id number]  Primary Care Physician: Simon Easley MD  Date of admission: 2024    Subjective   Subjective     Chief Complaint:  Shortness of breath     HPI:  Uzair Jacob is a 78 y.o. male with PMH significant for asthma, COPD, CAD, HLD, HTN, PAD, who presents to the ED with complaint of shortness of breath.  He states that he has been having progressive shortness of breath over the past 2 weeks.  He admits to having productive cough with large amounts of sputum.  He reports that this occurs intermittently at his baseline secondary to his severe asthma.  He denies fever, nausea, vomiting, diarrhea.  He notes that he has been unable to even walk short distances without becoming severely short of breath.  He follows outpatient with pulmonology who prescribed a long steroid taper which he has been taking as prescribed without improvement.  Upon arrival to the ED, he was reported to be hypoxic.  He is currently requiring 4 LNC supplemental oxygen.  He does not require supplemental oxygen at home.  He has mildly elevated troponin and mildly elevated creatinine from his baseline.  Pittore panel is negative as well as chest x-ray.  He will be admitted to hospital medicine for further evaluation.    Review of Systems   Constitutional:  Positive for activity change. Negative for appetite change, chills, diaphoresis, fatigue, fever and unexpected weight change.   HENT:  Positive for voice change. Negative for congestion, sinus pressure, sore throat and trouble swallowing.    Eyes: Negative.  Negative for visual disturbance.   Respiratory:  Positive for cough, shortness of breath and wheezing. Negative for chest tightness.    Cardiovascular:  Positive for leg swelling. Negative for chest pain and palpitations.   Gastrointestinal: Negative.  Negative for abdominal  Patient returned call and states that he prefers to  the list of STD clinics for further testing. He states that he will stop by on Thursday to  this list.   distention, constipation, diarrhea, nausea and vomiting.   Endocrine: Negative.  Negative for polydipsia and polyphagia.   Genitourinary: Negative.  Negative for difficulty urinating, dysuria, frequency and urgency.   Musculoskeletal: Negative.  Negative for arthralgias, back pain, gait problem, joint swelling, myalgias and neck pain.   Skin: Negative.  Negative for color change, pallor, rash and wound.   Allergic/Immunologic: Positive for immunocompromised state.   Neurological: Negative.  Negative for dizziness, speech difficulty, weakness, light-headedness, numbness and headaches.   Hematological: Negative.  Does not bruise/bleed easily.   Psychiatric/Behavioral: Negative.  Negative for confusion. The patient is not nervous/anxious.           Personal History     Past Medical History:   Diagnosis Date    Allergic     Allergic rhinitis     Asthma 03/08/2017    CAD (coronary artery disease) 03/08/2017    Chest pain     stent to LAD 2012    Chronic persistent asthma 12/04/2020    COPD (chronic obstructive pulmonary disease)     Deep vein thrombosis (DVT) of right lower extremity 04/18/2019    GERD (gastroesophageal reflux disease) 03/08/2017    Hyperlipidemia 03/08/2017    Hypertension 03/08/2017    Nasal polyps     nasal polypectomy 1996    PAD (peripheral artery disease) 03/08/2017    Pneumonia 1950       Past Surgical History:   Procedure Laterality Date    CARDIAC CATHETERIZATION  01/01/2018    CORONARY ANGIOPLASTY WITH STENT PLACEMENT  03/2012    Chest pain; stent to LAD    NASAL POLYP SURGERY  1996    Nasal polyps    POLYPECTOMY      Sinus polyp extraction.    TONSILLECTOMY         Family History:  family history includes Asthma in his brother and sister; Cancer in his father; Esophageal cancer in his father.     Social History:  reports that he has never smoked. He has never been exposed to tobacco smoke. He has never used smokeless tobacco. He reports current alcohol use of about 1.0 standard drink of  alcohol per week. He reports that he does not use drugs.  Social History     Social History Narrative        Retired     Lifelong non-smoker    Drinks about 1 alcoholic beverage on a weekly basis       Medications:  Budeson-Glycopyrrol-Formoterol, Triamcinolone Acetonide, albuterol sulfate HFA, aspirin, atorvastatin, chlorthalidone, esomeprazole, ipratropium, lisinopril, nebivolol, and predniSONE    No Known Allergies    Objective   Objective     Vital Signs:   Temp:  [98.2 °F (36.8 °C)] 98.2 °F (36.8 °C)  Heart Rate:  [68-76] 76  Resp:  [18-20] 18  BP: ()/(62-97) 107/80  Flow (L/min):  [5] 5    Physical Exam   Constitutional: Awake, alert, no acute distress   Eyes: PERRLA, sclerae anicteric, no conjunctival injection  HENT: NCAT, mucous membranes moist  Neck: Supple, no thyromegaly, no lymphadenopathy, trachea midline  Respiratory:decreased bilaterally, Left lung expiratory wheezing, nonlabored respirations, 4L NC, congested cough during exam   Cardiovascular: RRR, no murmurs, rubs, or gallops, palpable pedal pulses bilaterally  Gastrointestinal: Positive bowel sounds, soft, nontender, nondistended  Musculoskeletal: bilateral ankle edema, R>L no clubbing or cyanosis to extremities  Psychiatric: Appropriate affect, cooperative  Neurologic: Oriented x 3, strength symmetric in all extremities, Cranial Nerves grossly intact to confrontation, speech clear  Skin: No rashes      Result Review:  I have personally reviewed the results from the time of this admission to 6/26/2024 20:08 EDT and agree with these findings:  [x]  Laboratory list / accordion  [x]  Microbiology  [x]  Radiology  [x]  EKG/Telemetry   []  Cardiology/Vascular   []  Pathology  [x]  Old records  []  Other:  Most notable findings include:     LAB RESULTS:      Lab 06/26/24  1632   WBC 10.07   HEMOGLOBIN 15.3   HEMATOCRIT 46.3   PLATELETS 219   NEUTROS ABS 8.88*   IMMATURE GRANS (ABS) 0.08*   LYMPHS ABS 0.44*   MONOS ABS  0.63   EOS ABS 0.00   MCV 87.0         Lab 06/26/24  1632   SODIUM 137   POTASSIUM 4.5   CHLORIDE 96*   CO2 32.0*   ANION GAP 9.0   BUN 36*   CREATININE 1.33*   EGFR 54.7*   GLUCOSE 173*   CALCIUM 8.7         Lab 06/26/24  1632   TOTAL PROTEIN 6.7   ALBUMIN 3.0*   GLOBULIN 3.7   ALT (SGPT) 28   AST (SGOT) 23   BILIRUBIN 1.5*   ALK PHOS 59         Lab 06/26/24  1940 06/26/24  1632   PROBNP  --  600.4   HSTROP T 41* 37*                 Lab 06/26/24  1807   PH, ARTERIAL 7.457*   PCO2, ARTERIAL 43.1   PO2 .0*   FIO2 40   HCO3 ART 30.4*   BASE EXCESS ART 5.7*   CARBOXYHEMOGLOBIN 1.4     Brief Urine Lab Results  (Last result in the past 365 days)        Color   Clarity   Blood   Leuk Est   Nitrite   Protein   CREAT   Urine HCG        11/15/23 0840             156.1               Microbiology Results (last 10 days)       Procedure Component Value - Date/Time    Respiratory Panel PCR w/COVID-19(SARS-CoV-2) CYRUS/MARIBEL/RUI/PAD/COR/REINIER In-House, NP Swab in UTM/VTM, 2 HR TAT - Swab, Nasopharynx [[id number]]  (Normal) Collected: 06/26/24 1652    Lab Status: Final result Specimen: Swab from Nasopharynx Updated: 06/26/24 1753     ADENOVIRUS, PCR Not Detected     Coronavirus 229E Not Detected     Coronavirus HKU1 Not Detected     Coronavirus NL63 Not Detected     Coronavirus OC43 Not Detected     COVID19 Not Detected     Human Metapneumovirus Not Detected     Human Rhinovirus/Enterovirus Not Detected     Influenza A PCR Not Detected     Influenza B PCR Not Detected     Parainfluenza Virus 1 Not Detected     Parainfluenza Virus 2 Not Detected     Parainfluenza Virus 3 Not Detected     Parainfluenza Virus 4 Not Detected     RSV, PCR Not Detected     Bordetella pertussis pcr Not Detected     Bordetella parapertussis PCR Not Detected     Chlamydophila pneumoniae PCR Not Detected     Mycoplasma pneumo by PCR Not Detected    Narrative:      In the setting of a positive respiratory panel with a viral infection PLUS a negative  procalcitonin without other underlying concern for bacterial infection, consider observing off antibiotics or discontinuation of antibiotics and continue supportive care. If the respiratory panel is positive for atypical bacterial infection (Bordetella pertussis, Chlamydophila pneumoniae, or Mycoplasma pneumoniae), consider antibiotic de-escalation to target atypical bacterial infection.            XR Chest 1 View    Result Date: 6/26/2024  XR CHEST 1 VW Date of Exam: 6/26/2024 4:59 PM EDT Indication: SOA triage protocol Comparison: 3/31/2022 Findings: Lungs demonstrate coarsened interstitial markings with bibasilar atelectasis or scarring. Probable granuloma within the right midlung, similar since 3/31/2022. No pleural effusion or pneumothorax is identified. The cardiomediastinal silhouette and pulmonary vasculature appear within normal limits. No acute or suspicious osseous lesion is identified.     Impression: Impression: 1.Coarsened pulmonary interstitial markings may indicate underlying interstitial lung disease. 2.No significant change since 3/31/2022 Electronically Signed: David Gonzalez MD  6/26/2024 5:38 PM EDT  Workstation ID: [id number]     Results for orders placed during the hospital encounter of 12/06/19    Adult Transthoracic Echo Complete W/ Cont if Necessary Per Protocol    Interpretation Summary  · Estimated EF = 60%.  · The cardiac valves are anatomically and functionally normal.      Assessment & Plan   Assessment & Plan       COPD exacerbation    CAD (coronary artery disease)    PAD (peripheral artery disease)    Essential hypertension    Mixed hyperlipidemia    GERD    Acute respiratory failure with hypoxia    78 y.o. male with PMH significant for asthma, COPD, CAD, HLD, HTN, PAD, who presents to the ED with complaint of shortness of breath who is found to have acute hypoxic respiratory failure secondary to COPD exacerbation.     Acute hypoxic respiratory failure  COPD exacerbation  - Has been  on long steroid taper outpatient per pulmonologist  - Consider pulmonology consult in a.m. (Dr. Quiroz)  - Wean supplemental oxygen  - DuoNebs scheduled and as needed  - Solu-Medrol 40 mg 3 times daily  - Doxycycline twice daily  - CBC, BMP in the a.m.    Right lower extremity swelling  - Previous history of DVT  - Venous duplex in the a.m.    Elevated creatinine  - Baseline 1.06, currently 1.33  - BMP in the a.m.    Hypertension  Dyslipidemia  CAD  PAD  - Continue daily ASA  - Continue Lipitor  - Continue chlorthalidone, Bystolic, lisinopril    GERD  - Continue PPI      DVT prophylaxis: Heparin    CODE STATUS:    Code Status (Patient has no pulse and is not breathing): CPR (Attempt to Resuscitate)  Medical Interventions (Patient has pulse or is breathing): Full Support      Expected Discharge  Expected Discharge Date: 6/28/2024; Expected Discharge Time:     Signature: Electronically signed by CATERINA Winston, 06/26/24, 8:08 PM EDT.

## 2024-06-26 NOTE — ED PROVIDER NOTES
Subjective   History of Present Illness  Is a 78-year-old male with past medical history of COPD presented to the emergency department with cough and difficulty breathing.  The patient has been dealing with this for the last 2 weeks.  Recently saw his pulmonologist was placed on a steroid taper.  He states that his symptoms are not getting any better.  He mainly notices when he exerts himself.  He states when he gets up to walk to the bathroom even he gets very short of breath.  Is atypical for him.  He does not normally require oxygen at home.  He had a mild cough.  Nonproductive in nature.  No hemoptysis.  Denies any fevers or chills.  No headache or change in vision.  No focal weakness.  No chest pain.  No abdominal pain or vomiting    History provided by:  Patient   used: No        Review of Systems   Constitutional:  Negative for chills and fever.   HENT:  Negative for congestion, ear pain and sore throat.    Eyes:  Negative for visual disturbance.   Respiratory:  Positive for cough, shortness of breath and wheezing.    Cardiovascular:  Negative for chest pain.   Gastrointestinal:  Negative for abdominal pain.   Genitourinary:  Negative for difficulty urinating.   Musculoskeletal:  Negative for arthralgias.   Skin:  Negative for rash.   Neurological:  Negative for dizziness, weakness and numbness.   Psychiatric/Behavioral:  Negative for agitation.        Past Medical History:   Diagnosis Date    Allergic     Allergic rhinitis     Asthma 03/08/2017    CAD (coronary artery disease) 03/08/2017    Chest pain     stent to LAD 2012    Chronic persistent asthma 12/04/2020    COPD (chronic obstructive pulmonary disease)     Deep vein thrombosis (DVT) of right lower extremity 04/18/2019    GERD (gastroesophageal reflux disease) 03/08/2017    Hyperlipidemia 03/08/2017    Hypertension 03/08/2017    Nasal polyps     nasal polypectomy 1996    PAD (peripheral artery disease) 03/08/2017    Pneumonia 1950        No Known Allergies    Past Surgical History:   Procedure Laterality Date    CARDIAC CATHETERIZATION  01/01/2018    CORONARY ANGIOPLASTY WITH STENT PLACEMENT  03/2012    Chest pain; stent to LAD    NASAL POLYP SURGERY  1996    Nasal polyps    POLYPECTOMY      Sinus polyp extraction.    TONSILLECTOMY         Family History   Problem Relation Age of Onset    Esophageal cancer Father         cause of death    Cancer Father     Asthma Sister     Asthma Brother        Social History     Socioeconomic History    Marital status:    Tobacco Use    Smoking status: Never     Passive exposure: Never    Smokeless tobacco: Never   Vaping Use    Vaping status: Never Used   Substance and Sexual Activity    Alcohol use: Yes     Alcohol/week: 1.0 standard drink of alcohol     Types: 1 Glasses of wine per week    Drug use: Never    Sexual activity: Not Currently     Partners: Female     Birth control/protection: Spermicide, Tubal ligation           Objective   Physical Exam  Vitals and nursing note reviewed.   Constitutional:       General: He is not in acute distress.     Appearance: He is not ill-appearing or toxic-appearing.   HENT:      Mouth/Throat:      Pharynx: No posterior oropharyngeal erythema.   Eyes:      Extraocular Movements: Extraocular movements intact.      Pupils: Pupils are equal, round, and reactive to light.   Cardiovascular:      Rate and Rhythm: Normal rate and regular rhythm.   Pulmonary:      Effort: Pulmonary effort is normal. Tachypnea and respiratory distress present.      Breath sounds: Wheezing present.   Abdominal:      General: Abdomen is flat. There is no distension.      Palpations: There is no mass.      Tenderness: There is no abdominal tenderness. There is no guarding or rebound.   Musculoskeletal:         General: No deformity. Normal range of motion.   Neurological:      General: No focal deficit present.      Mental Status: He is alert.      Sensory: No sensory deficit.      Motor:  No weakness.         ECG 12 Lead      Date/Time: 6/26/2024 4:26 PM    Performed by: Blas Palomares MD  Authorized by: Blas Palomares MD  Interpreted by ED physician  Comparison: compared with previous ECG   Similar to previous ECG  Rhythm: sinus rhythm  Rate: normal  BPM: 74  QRS axis: normal  Conduction: conduction normal  Other findings comments: Nonspecific ST changes  Clinical impression: non-specific ECG  Comments: Interpretation:  EKG was directly visualized by myself, interpretations as documented in hospital course.               ED Course  ED Course as of 06/26/24 1904 Wed Jun 26, 2024 1628 BP: 122/96 [JK]   1628 Temp: 98.2 °F (36.8 °C) [JK]   1628 Temp src: Oral [JK]   1628 Heart Rate: 76 [JK]   1628 Resp: 20 [JK]   1628 SpO2: 90 % [JK]   1628 Device (Oxygen Therapy): room air  Interpretation:  Patient's repeat vitals, telemetry tracing, and pulse oximetry tracing were directly viewed and interpreted by myself.  Normal sinus rhythm [JK]   1902 Comprehensive Metabolic Panel(!) [JK]   1902 BNP [JK]   1902 Single High Sensitivity Troponin T(!) [JK]   1902 Respiratory Panel PCR w/COVID-19(SARS-CoV-2) CYRUS/MARIBEL/RUI/PAD/COR/REINIER In-House, NP Swab in UTM/VTM, 2 HR TAT - Swab, Nasopharynx [JK]   1902 CBC & Differential(!)  Interpretation:  Laboratory studies were reviewed and interpreted directly by myself.  CBC normal, respiratory panel normal, initial troponin was 37, BNP normal, CMP showed some acute kidney injury with a BUN 36, creatinine 1.33 [JK]   1903 XR Chest 1 View  Interpretation:  Imaging was directly visualized by myself, per my interpretations, chest x-ray showed diffuse interstitial disease. [JK]   1903 On reevaluation, the patient states that his breathing is improved.  Repeat exam shows wheezing is improved.  However the patient remains hypoxic.  We did trial him off of supplemental oxygen and he did not tolerate.  Patient be admitted to hospital for acute respiratory failure. [JK]    1903 Based on the patient's presentation, history and diffuse work-up in the emergency department, the patient is deemed appropriate for admission to the hospital for further evaluation and treatment.  This was discussed with the patient at bedside.  They are in agreement with the current medical management.    Admitting physician: Dr. Singh    Discussion was had with admitting physician regarding the laboratory and imaging findings.  We did discuss current therapeutics in the emergency department and progression of the patient.  Working diagnosis was conveyed to the admitting physician, as well as current status and prognosis for the patient.  They are in agreement with these findings and have accepted admission.    Shared decision making:   After full review of the patient's clinical presentation, review of any work-up including but not limited to laboratory studies and radiology obtained, I had a discussion with the patient.  Treatment options were discussed as well as the risks, benefits and consequences.  I discussed all findings with the patient and family members if available.  During the discussion, treatment goals were understood by all as well as any misconceptions which were addressed with the patient.  Ample time was given for any questions they may have had.  They are in agreement with the treatment plan as well as final disposition. [JK]      ED Course User Index  [JK] Blas Palomares MD                                             Medical Decision Making  This is a 78-year-old male with history of COPD presented to the emergency department with difficulty breathing.  The patient does have some hypoxia as well as wheezing on initial presentation.  Symptoms seem consistent with COPD exacerbation and respiratory failure.  Patient is currently requiring 2 L nasal cannula oxygen supplementation.  He does not normally wear oxygen at home.  Concern for possible pneumonia or bronchitis.  Overall, the  patient is nontoxic.  Afebrile.  IV access was established and the patient.  Placed on continuous telemetry monitoring.  Given the patient's presentation, differential is broad and will require further evaluation.  Workup initiated.      Differential diagnosis: URI, bronchitis, COVID, influenza, sinusitis, viral syndrome, RSV, pneumonia, COPD exacerbation, respiratory failure      Amount and/or Complexity of Data Reviewed  External Data Reviewed: labs, radiology, ECG and notes.     Details: External laboratories, imaging as well as notes were reviewed personally by myself.  All relevant studies were used to guide decision making.     Date of previous record: 5/16/2024    Source of note: Pulmonology    Summary: Patient was seen and evaluated for difficulty breathing.  I did review basic laboratory studies on file as well as a CT of the chest which revealed significant bronchopneumonia, EKG reviewed, records reviewed    Labs: ordered. Decision-making details documented in ED Course.  Radiology: ordered and independent interpretation performed. Decision-making details documented in ED Course.  ECG/medicine tests: ordered and independent interpretation performed. Decision-making details documented in ED Course.    Risk  Prescription drug management.    Critical Care  Total time providing critical care: 32 minutes (Authorized and performed by: Blas Palomares MD  I personally spent a total of 32 minutes of critical care time with the patient.  Due to the high probability of clinically significant, life-threatening deterioration, the patient required my highest level of care to intervene emergently.  These interventions, including, but not limited to, establishing IV access, continuous pulse oximeter and telemetry monitoring, frequent monitoring and reevaluations, management the patient's airway and cardiovascular system, discussion with other consultants as needed, which bear directly on the management the patient.   This also includes obtaining history, examining the patient, frequent reevaluations and coordinating high level of care.  Failure to emergently initiate these interventions would carry a high probability of resulting in sudden, clinically significant or life threatening deterioration in the patient's condition.  This does not include time spent on separately reported billable procedures.)        Final diagnoses:   Acute on chronic respiratory failure with hypoxia   Acute kidney injury   COPD exacerbation       ED Disposition  ED Disposition       ED Disposition   Decision to Admit    Condition   --    Comment   --               No follow-up provider specified.       Medication List      No changes were made to your prescriptions during this visit.            Blas Palomares MD  06/26/24 [id number]

## 2024-06-27 ENCOUNTER — APPOINTMENT (OUTPATIENT)
Dept: CARDIOLOGY | Facility: HOSPITAL | Age: 79
End: 2024-06-27
Payer: MEDICARE

## 2024-06-27 ENCOUNTER — APPOINTMENT (OUTPATIENT)
Dept: CT IMAGING | Facility: HOSPITAL | Age: 79
End: 2024-06-27
Payer: MEDICARE

## 2024-06-27 LAB
ANION GAP SERPL CALCULATED.3IONS-SCNC: 11 MMOL/L (ref 5–15)
BASOPHILS # BLD AUTO: 0.02 10*3/MM3 (ref 0–0.2)
BASOPHILS NFR BLD AUTO: 0.3 % (ref 0–1.5)
BH CV LOW VAS RIGHT DISTAL FEMORAL SPONT: 1
BH CV LOW VAS RIGHT GASTRONEMIUS VESSEL: 1
BH CV LOW VAS RIGHT MID FEMORAL SPONT: 1
BH CV LOW VAS RIGHT PERONEAL VESSEL: 1
BH CV LOW VAS RIGHT POPLITEAL SPONT: 1
BH CV LOW VAS RIGHT POSTERIOR TIBIAL VESSEL: 1
BH CV LOW VAS RIGHT PROXIMAL FEMORAL SPONT: 1
BH CV LOWER VASCULAR RIGHT COMMON FEMORAL AUGMENT: NORMAL
BH CV LOWER VASCULAR RIGHT COMMON FEMORAL COMPETENT: NORMAL
BH CV LOWER VASCULAR RIGHT COMMON FEMORAL COMPRESS: NORMAL
BH CV LOWER VASCULAR RIGHT COMMON FEMORAL PHASIC: NORMAL
BH CV LOWER VASCULAR RIGHT COMMON FEMORAL SPONT: NORMAL
BH CV LOWER VASCULAR RIGHT DISTAL FEMORAL AUGMENT: NORMAL
BH CV LOWER VASCULAR RIGHT DISTAL FEMORAL COMPETENT: NORMAL
BH CV LOWER VASCULAR RIGHT DISTAL FEMORAL COMPRESS: NORMAL
BH CV LOWER VASCULAR RIGHT DISTAL FEMORAL PHASIC: NORMAL
BH CV LOWER VASCULAR RIGHT DISTAL FEMORAL SPONT: NORMAL
BH CV LOWER VASCULAR RIGHT GASTRONEMIUS COMPRESS: NORMAL
BH CV LOWER VASCULAR RIGHT GREATER SAPH AK COMPRESS: NORMAL
BH CV LOWER VASCULAR RIGHT GREATER SAPH BK COMPRESS: NORMAL
BH CV LOWER VASCULAR RIGHT LESSER SAPH COMPRESS: NORMAL
BH CV LOWER VASCULAR RIGHT MID FEMORAL AUGMENT: NORMAL
BH CV LOWER VASCULAR RIGHT MID FEMORAL COMPETENT: NORMAL
BH CV LOWER VASCULAR RIGHT MID FEMORAL COMPRESS: NORMAL
BH CV LOWER VASCULAR RIGHT MID FEMORAL PHASIC: NORMAL
BH CV LOWER VASCULAR RIGHT MID FEMORAL SPONT: NORMAL
BH CV LOWER VASCULAR RIGHT PERONEAL COMPRESS: NORMAL
BH CV LOWER VASCULAR RIGHT POPLITEAL AUGMENT: NORMAL
BH CV LOWER VASCULAR RIGHT POPLITEAL COMPETENT: NORMAL
BH CV LOWER VASCULAR RIGHT POPLITEAL COMPRESS: NORMAL
BH CV LOWER VASCULAR RIGHT POPLITEAL PHASIC: NORMAL
BH CV LOWER VASCULAR RIGHT POPLITEAL SPONT: NORMAL
BH CV LOWER VASCULAR RIGHT POSTERIOR TIBIAL COMPRESS: NORMAL
BH CV LOWER VASCULAR RIGHT PROXIMAL FEMORAL AUGMENT: NORMAL
BH CV LOWER VASCULAR RIGHT PROXIMAL FEMORAL COMPETENT: NORMAL
BH CV LOWER VASCULAR RIGHT PROXIMAL FEMORAL COMPRESS: NORMAL
BH CV LOWER VASCULAR RIGHT PROXIMAL FEMORAL PHASIC: NORMAL
BH CV LOWER VASCULAR RIGHT PROXIMAL FEMORAL SPONT: NORMAL
BH CV LOWER VASCULAR RIGHT SAPHENOFEMORAL JUNCTION COMPRESS: NORMAL
BH CV VAS PRELIMINARY FINDINGS SCRIPTING: 1
BUN SERPL-MCNC: 46 MG/DL (ref 8–23)
BUN/CREAT SERPL: 33.8 (ref 7–25)
CALCIUM SPEC-SCNC: 8.3 MG/DL (ref 8.6–10.5)
CHLORIDE SERPL-SCNC: 96 MMOL/L (ref 98–107)
CO2 SERPL-SCNC: 30 MMOL/L (ref 22–29)
CREAT SERPL-MCNC: 1.36 MG/DL (ref 0.76–1.27)
DEPRECATED RDW RBC AUTO: 45.6 FL (ref 37–54)
EGFRCR SERPLBLD CKD-EPI 2021: 53.3 ML/MIN/1.73
EOSINOPHIL # BLD AUTO: 0 10*3/MM3 (ref 0–0.4)
EOSINOPHIL NFR BLD AUTO: 0 % (ref 0.3–6.2)
ERYTHROCYTE [DISTWIDTH] IN BLOOD BY AUTOMATED COUNT: 14.3 % (ref 12.3–15.4)
GEN 5 2HR TROPONIN T REFLEX: 29 NG/L
GLUCOSE SERPL-MCNC: 286 MG/DL (ref 65–99)
HCT VFR BLD AUTO: 44.1 % (ref 37.5–51)
HGB BLD-MCNC: 14.6 G/DL (ref 13–17.7)
IMM GRANULOCYTES # BLD AUTO: 0.05 10*3/MM3 (ref 0–0.05)
IMM GRANULOCYTES NFR BLD AUTO: 0.7 % (ref 0–0.5)
LYMPHOCYTES # BLD AUTO: 0.27 10*3/MM3 (ref 0.7–3.1)
LYMPHOCYTES NFR BLD AUTO: 3.7 % (ref 19.6–45.3)
MAGNESIUM SERPL-MCNC: 1.8 MG/DL (ref 1.6–2.4)
MCH RBC QN AUTO: 28.9 PG (ref 26.6–33)
MCHC RBC AUTO-ENTMCNC: 33.1 G/DL (ref 31.5–35.7)
MCV RBC AUTO: 87.3 FL (ref 79–97)
MONOCYTES # BLD AUTO: 0.11 10*3/MM3 (ref 0.1–0.9)
MONOCYTES NFR BLD AUTO: 1.5 % (ref 5–12)
NEUTROPHILS NFR BLD AUTO: 6.85 10*3/MM3 (ref 1.7–7)
NEUTROPHILS NFR BLD AUTO: 93.8 % (ref 42.7–76)
NRBC BLD AUTO-RTO: 0 /100 WBC (ref 0–0.2)
PLATELET # BLD AUTO: 202 10*3/MM3 (ref 140–450)
PMV BLD AUTO: 9.5 FL (ref 6–12)
POTASSIUM SERPL-SCNC: 4.4 MMOL/L (ref 3.5–5.2)
RBC # BLD AUTO: 5.05 10*6/MM3 (ref 4.14–5.8)
SODIUM SERPL-SCNC: 137 MMOL/L (ref 136–145)
TROPONIN T DELTA: -12 NG/L
WBC NRBC COR # BLD AUTO: 7.3 10*3/MM3 (ref 3.4–10.8)

## 2024-06-27 PROCEDURE — 80048 BASIC METABOLIC PNL TOTAL CA: CPT | Performed by: NURSE PRACTITIONER

## 2024-06-27 PROCEDURE — 83735 ASSAY OF MAGNESIUM: CPT | Performed by: NURSE PRACTITIONER

## 2024-06-27 PROCEDURE — 84484 ASSAY OF TROPONIN QUANT: CPT | Performed by: NURSE PRACTITIONER

## 2024-06-27 PROCEDURE — 96376 TX/PRO/DX INJ SAME DRUG ADON: CPT

## 2024-06-27 PROCEDURE — 71275 CT ANGIOGRAPHY CHEST: CPT

## 2024-06-27 PROCEDURE — 94664 DEMO&/EVAL PT USE INHALER: CPT

## 2024-06-27 PROCEDURE — 85025 COMPLETE CBC W/AUTO DIFF WBC: CPT | Performed by: NURSE PRACTITIONER

## 2024-06-27 PROCEDURE — 93971 EXTREMITY STUDY: CPT

## 2024-06-27 PROCEDURE — 99232 SBSQ HOSP IP/OBS MODERATE 35: CPT | Performed by: INTERNAL MEDICINE

## 2024-06-27 PROCEDURE — 94799 UNLISTED PULMONARY SVC/PX: CPT

## 2024-06-27 PROCEDURE — 25510000001 IOPAMIDOL PER 1 ML: Performed by: INTERNAL MEDICINE

## 2024-06-27 PROCEDURE — 25010000002 METHYLPREDNISOLONE PER 40 MG: Performed by: NURSE PRACTITIONER

## 2024-06-27 PROCEDURE — 96372 THER/PROPH/DIAG INJ SC/IM: CPT

## 2024-06-27 PROCEDURE — 25010000002 HEPARIN (PORCINE) PER 1000 UNITS: Performed by: NURSE PRACTITIONER

## 2024-06-27 PROCEDURE — G0378 HOSPITAL OBSERVATION PER HR: HCPCS

## 2024-06-27 PROCEDURE — 93971 EXTREMITY STUDY: CPT | Performed by: INTERNAL MEDICINE

## 2024-06-27 RX ORDER — PREDNISONE 20 MG/1
40 TABLET ORAL
Status: DISCONTINUED | OUTPATIENT
Start: 2024-06-28 | End: 2024-06-28 | Stop reason: HOSPADM

## 2024-06-27 RX ADMIN — IOPAMIDOL 60 ML: 755 INJECTION, SOLUTION INTRAVENOUS at 12:32

## 2024-06-27 RX ADMIN — ASPIRIN 325 MG: 325 TABLET ORAL at 08:50

## 2024-06-27 RX ADMIN — LISINOPRIL 10 MG: 10 TABLET ORAL at 08:50

## 2024-06-27 RX ADMIN — HEPARIN SODIUM 5000 UNITS: 5000 INJECTION INTRAVENOUS; SUBCUTANEOUS at 05:17

## 2024-06-27 RX ADMIN — IPRATROPIUM BROMIDE AND ALBUTEROL SULFATE 3 ML: 2.5; .5 SOLUTION RESPIRATORY (INHALATION) at 07:18

## 2024-06-27 RX ADMIN — NEBIVOLOL 10 MG: 10 TABLET ORAL at 08:52

## 2024-06-27 RX ADMIN — IPRATROPIUM BROMIDE 2 SPRAY: 21 SPRAY, METERED NASAL at 09:21

## 2024-06-27 RX ADMIN — APIXABAN 10 MG: 5 TABLET, FILM COATED ORAL at 19:58

## 2024-06-27 RX ADMIN — IPRATROPIUM BROMIDE AND ALBUTEROL SULFATE 3 ML: 2.5; .5 SOLUTION RESPIRATORY (INHALATION) at 00:51

## 2024-06-27 RX ADMIN — IPRATROPIUM BROMIDE 2 SPRAY: 21 SPRAY, METERED NASAL at 19:58

## 2024-06-27 RX ADMIN — DOXYCYCLINE 100 MG: 100 CAPSULE ORAL at 08:50

## 2024-06-27 RX ADMIN — APIXABAN 10 MG: 5 TABLET, FILM COATED ORAL at 13:03

## 2024-06-27 RX ADMIN — CHLORTHALIDONE 12.5 MG: 25 TABLET ORAL at 09:21

## 2024-06-27 RX ADMIN — ATORVASTATIN CALCIUM 20 MG: 20 TABLET, FILM COATED ORAL at 19:58

## 2024-06-27 RX ADMIN — Medication 10 ML: at 08:53

## 2024-06-27 RX ADMIN — PANTOPRAZOLE SODIUM 40 MG: 40 TABLET, DELAYED RELEASE ORAL at 05:17

## 2024-06-27 RX ADMIN — METHYLPREDNISOLONE SODIUM SUCCINATE 40 MG: 40 INJECTION, POWDER, FOR SOLUTION INTRAMUSCULAR; INTRAVENOUS at 05:17

## 2024-06-27 RX ADMIN — IPRATROPIUM BROMIDE AND ALBUTEROL SULFATE 3 ML: 2.5; .5 SOLUTION RESPIRATORY (INHALATION) at 19:00

## 2024-06-27 RX ADMIN — DOXYCYCLINE 100 MG: 100 CAPSULE ORAL at 19:58

## 2024-06-27 RX ADMIN — IPRATROPIUM BROMIDE AND ALBUTEROL SULFATE 3 ML: 2.5; .5 SOLUTION RESPIRATORY (INHALATION) at 13:23

## 2024-06-27 NOTE — ED NOTES
Uzair Jacob    Nursing Report ED to Floor:  Mental status: Alert and oriented x4  Ambulatory status: SBA   Oxygen Therapy:  5L NC  Cardiac Rhythm: NSR 80s  Admitted from: Home   Safety Concerns:  Fall risk   Social Issues: none  ED Room #:  09    ED Nurse Phone Extension - #[phone] or may call [phone].      HPI:   Chief Complaint   Patient presents with    Shortness of Breath       Past Medical History:  Past Medical History:   Diagnosis Date    Allergic     Allergic rhinitis     Asthma 03/08/2017    CAD (coronary artery disease) 03/08/2017    Chest pain     stent to LAD 2012    Chronic persistent asthma 12/04/2020    COPD (chronic obstructive pulmonary disease)     Deep vein thrombosis (DVT) of right lower extremity 04/18/2019    GERD (gastroesophageal reflux disease) 03/08/2017    Hyperlipidemia 03/08/2017    Hypertension 03/08/2017    Nasal polyps     nasal polypectomy 1996    PAD (peripheral artery disease) 03/08/2017    Pneumonia 1950        Past Surgical History:  Past Surgical History:   Procedure Laterality Date    CARDIAC CATHETERIZATION  01/01/2018    CORONARY ANGIOPLASTY WITH STENT PLACEMENT  03/2012    Chest pain; stent to LAD    NASAL POLYP SURGERY  1996    Nasal polyps    POLYPECTOMY      Sinus polyp extraction.    TONSILLECTOMY          Admitting Doctor:   Maira Singh MD    Consulting Provider(s):  Consults       No orders found from 5/28/2024 to 6/27/2024.             Admitting Diagnosis:   The primary encounter diagnosis was Acute on chronic respiratory failure with hypoxia. Diagnoses of Acute kidney injury and COPD exacerbation were also pertinent to this visit.    Most Recent Vitals:   Vitals:    06/26/24 1845 06/26/24 1900 06/26/24 1915 06/26/24 1930   BP: 106/74  98/62 107/80   BP Location:       Patient Position:       Pulse: 70 73 71 76   Resp:  18     Temp:       TempSrc:       SpO2: 96% 93% 94% 93%   Weight:       Height:           Active LDAs/IV Access:   Lines, Drains & Airways        Active LDAs       Name Placement date Placement time Site Days    Peripheral IV 06/26/24 1753 Anterior;Left Forearm 06/26/24 1753  Forearm  less than 1                    Labs (abnormal labs have a star):   Labs Reviewed   COMPREHENSIVE METABOLIC PANEL - Abnormal; Notable for the following components:       Result Value    Glucose 173 (*)     BUN 36 (*)     Creatinine 1.33 (*)     Chloride 96 (*)     CO2 32.0 (*)     Albumin 3.0 (*)     Total Bilirubin 1.5 (*)     BUN/Creatinine Ratio 27.1 (*)     eGFR 54.7 (*)     All other components within normal limits    Narrative:     GFR Normal >60  Chronic Kidney Disease <60  Kidney Failure <15    The GFR formula is only valid for adults with stable renal function between ages 18 and 70.   SINGLE HS TROPONIN T - Abnormal; Notable for the following components:    HS Troponin T 37 (*)     All other components within normal limits    Narrative:     High Sensitive Troponin T Reference Range:  <14.0 ng/L- Negative Female for AMI  <22.0 ng/L- Negative Male for AMI  >=14 - Abnormal Female indicating possible myocardial injury.  >=22 - Abnormal Male indicating possible myocardial injury.   Clinicians would have to utilize clinical acumen, EKG, Troponin, and serial changes to determine if it is an Acute Myocardial Infarction or myocardial injury due to an underlying chronic condition.        CBC WITH AUTO DIFFERENTIAL - Abnormal; Notable for the following components:    Neutrophil % 88.1 (*)     Lymphocyte % 4.4 (*)     Eosinophil % 0.0 (*)     Immature Grans % 0.8 (*)     Neutrophils, Absolute 8.88 (*)     Lymphocytes, Absolute 0.44 (*)     Immature Grans, Absolute 0.08 (*)     All other components within normal limits   BLOOD GAS, ARTERIAL W/CO-OXIMETRY - Abnormal; Notable for the following components:    pH, Arterial 7.457 (*)     pO2, Arterial 114.0 (*)     HCO3, Arterial 30.4 (*)     Base Excess, Arterial 5.7 (*)     pO2, Temperature Corrected 114 (*)     All other  components within normal limits   TROPONIN - Abnormal; Notable for the following components:    HS Troponin T 41 (*)     All other components within normal limits    Narrative:     High Sensitive Troponin T Reference Range:  <14.0 ng/L- Negative Female for AMI  <22.0 ng/L- Negative Male for AMI  >=14 - Abnormal Female indicating possible myocardial injury.  >=22 - Abnormal Male indicating possible myocardial injury.   Clinicians would have to utilize clinical acumen, EKG, Troponin, and serial changes to determine if it is an Acute Myocardial Infarction or myocardial injury due to an underlying chronic condition.        RESPIRATORY PANEL PCR W/ COVID-19 (SARS-COV-2), NP SWAB IN UTM/VTP, 2 HR TAT - Normal    Narrative:     In the setting of a positive respiratory panel with a viral infection PLUS a negative procalcitonin without other underlying concern for bacterial infection, consider observing off antibiotics or discontinuation of antibiotics and continue supportive care. If the respiratory panel is positive for atypical bacterial infection (Bordetella pertussis, Chlamydophila pneumoniae, or Mycoplasma pneumoniae), consider antibiotic de-escalation to target atypical bacterial infection.   BNP (IN-HOUSE) - Normal    Narrative:     This assay is used as an aid in the diagnosis of individuals suspected of having heart failure. It can be used as an aid in the diagnosis of acute decompensated heart failure (ADHF) in patients presenting with signs and symptoms of ADHF to the emergency department (ED). In addition, NT-proBNP of <300 pg/mL indicates ADHF is not likely.    Age Range Result Interpretation  NT-proBNP Concentration (pg/mL:      <50             Positive            >450                   Gray                 300-450                    Negative             <300    50-75           Positive            >900                  Gray                300-900                  Negative            <300      >75              Positive            >1800                  Gray                300-1800                  Negative            <300   RAINBOW DRAW    Narrative:     The following orders were created for panel order [place] Draw.  Procedure                               Abnormality         Status                     ---------                               -----------         ------                     Green Top (Gel)[[id number]]                                  Final result               Lavender Top[[id number]]                                     Final result               Gold Top - SST[[id number]]                                   Final result               Brewer Top[[id number]]                                         Final result               Light Blue Top[[id number]]                                   Final result                 Please view results for these tests on the individual orders.   BLOOD GAS, ARTERIAL   HIGH SENSITIVITIY TROPONIN T 2HR   CBC AND DIFFERENTIAL    Narrative:     The following orders were created for panel order CBC & Differential.  Procedure                               Abnormality         Status                     ---------                               -----------         ------                     CBC Auto Differential[[id number]]        Abnormal            Final result                 Please view results for these tests on the individual orders.   GREEN TOP   LAVENDER TOP   GOLD TOP - SST   GRAY TOP   LIGHT BLUE TOP       Meds Given in ED:   Medications   sodium chloride 0.9 % flush 10 mL (has no administration in time range)   ipratropium-albuterol (DUO-NEB) nebulizer solution 3 mL (3 mL Nebulization Given 6/26/24 1803)   methylPREDNISolone sodium succinate (SOLU-Medrol) injection 125 mg (125 mg Intravenous Given 6/26/24 1759)   ipratropium-albuterol (DUO-NEB) nebulizer solution 3 mL (3 mL Nebulization Given 6/26/24 1900)           Last NIH score:                                                           Dysphagia screening results:        [place] Coma Scale:  No data recorded     CIWA:        Restraint Type:            Isolation Status:  No active isolations

## 2024-06-27 NOTE — PROGRESS NOTES
[facility] Medicine Services  PROGRESS NOTE    Patient Name: Uzair Jacob  : 1945  MRN: [id number]    Date of Admission: 2024  Primary Care Physician: Simon Easley MD    Subjective   Subjective     CC:  SOA    HPI:  Pt states that his SOA is better. Thinks he will be ready to go home soon- just wants to have supplemental O2 set up at home prior to discharge.       Objective   Objective     Vital Signs:   Temp:  [97.6 °F (36.4 °C)-98.2 °F (36.8 °C)] 97.6 °F (36.4 °C)  Heart Rate:  [64-77] 64  Resp:  [18-20] 18  BP: ()/(60-97) 114/65  Flow (L/min):  [3-5] 3     Physical Exam:  Constitutional: No acute distress, awake, alert, ultrasound tech at bedside getting venous duplex of RLE  HENT: NCAT, mucous membranes moist  Respiratory: Clear to auscultation bilaterally, respiratory effort normal   Cardiovascular: RRR, no murmurs, rubs, or gallops  Gastrointestinal: Positive bowel sounds, soft, nontender, nondistended  Musculoskeletal: No L ankle edema, RLE with edema   Psychiatric: Appropriate affect, cooperative  Neurologic: Oriented x 3, strength symmetric in all extremities, Cranial Nerves grossly intact to confrontation, speech clear  Skin: No rashes     Results Reviewed:  LAB RESULTS:      Lab 24  1632   WBC 7.30 10.07   HEMOGLOBIN 14.6 15.3   HEMATOCRIT 44.1 46.3   PLATELETS 202 219   NEUTROS ABS 6.85 8.88*   IMMATURE GRANS (ABS) 0.05 0.08*   LYMPHS ABS 0.27* 0.44*   MONOS ABS 0.11 0.63   EOS ABS 0.00 0.00   MCV 87.3 87.0         Lab 24  0013 06/26/24  1940 06/26/24  1632   SODIUM 137  --  137   POTASSIUM 4.4  --  4.5   CHLORIDE 96*  --  96*   CO2 30.0*  --  32.0*   ANION GAP 11.0  --  9.0   BUN 46*  --  36*   CREATININE 1.36*  --  1.33*   EGFR 53.3*  --  54.7*   GLUCOSE 286*  --  173*   CALCIUM 8.3*  --  8.7   MAGNESIUM 1.8 1.7  --          Lab 24  1632   TOTAL PROTEIN 6.7   ALBUMIN 3.0*   GLOBULIN 3.7   ALT (SGPT) 28    AST (SGOT) 23   BILIRUBIN 1.5*   ALK PHOS 59         Lab 06/27/24  0013 06/26/24  1940 06/26/24  1632   PROBNP  --   --  600.4   HSTROP T 29* 41* 37*                 Lab 06/26/24  1807   PH, ARTERIAL 7.457*   PCO2, ARTERIAL 43.1   PO2 .0*   FIO2 40   HCO3 ART 30.4*   BASE EXCESS ART 5.7*   CARBOXYHEMOGLOBIN 1.4     Brief Urine Lab Results  (Last result in the past 365 days)        Color   Clarity   Blood   Leuk Est   Nitrite   Protein   CREAT   Urine HCG        11/15/23 0840             156.1                 Microbiology Results Abnormal       Procedure Component Value - Date/Time    Respiratory Panel PCR w/COVID-19(SARS-CoV-2) CYRUS/MARIBEL/RUI/PAD/COR/REINIER In-House, NP Swab in UTM/VTM, 2 HR TAT - Swab, Nasopharynx [[id number]]  (Normal) Collected: 06/26/24 1652    Lab Status: Final result Specimen: Swab from Nasopharynx Updated: 06/26/24 1753     ADENOVIRUS, PCR Not Detected     Coronavirus 229E Not Detected     Coronavirus HKU1 Not Detected     Coronavirus NL63 Not Detected     Coronavirus OC43 Not Detected     COVID19 Not Detected     Human Metapneumovirus Not Detected     Human Rhinovirus/Enterovirus Not Detected     Influenza A PCR Not Detected     Influenza B PCR Not Detected     Parainfluenza Virus 1 Not Detected     Parainfluenza Virus 2 Not Detected     Parainfluenza Virus 3 Not Detected     Parainfluenza Virus 4 Not Detected     RSV, PCR Not Detected     Bordetella pertussis pcr Not Detected     Bordetella parapertussis PCR Not Detected     Chlamydophila pneumoniae PCR Not Detected     Mycoplasma pneumo by PCR Not Detected    Narrative:      In the setting of a positive respiratory panel with a viral infection PLUS a negative procalcitonin without other underlying concern for bacterial infection, consider observing off antibiotics or discontinuation of antibiotics and continue supportive care. If the respiratory panel is positive for atypical bacterial infection (Bordetella pertussis, Chlamydophila  pneumoniae, or Mycoplasma pneumoniae), consider antibiotic de-escalation to target atypical bacterial infection.            XR Chest 1 View    Result Date: 6/26/2024  XR CHEST 1 VW Date of Exam: 6/26/2024 4:59 PM EDT Indication: SOA triage protocol Comparison: 3/31/2022 Findings: Lungs demonstrate coarsened interstitial markings with bibasilar atelectasis or scarring. Probable granuloma within the right midlung, similar since 3/31/2022. No pleural effusion or pneumothorax is identified. The cardiomediastinal silhouette and pulmonary vasculature appear within normal limits. No acute or suspicious osseous lesion is identified.     Impression: Impression: 1.Coarsened pulmonary interstitial markings may indicate underlying interstitial lung disease. 2.No significant change since 3/31/2022 Electronically Signed: David Gonzalez MD  6/26/2024 5:38 PM EDT  Workstation ID: [id number]     Results for orders placed during the hospital encounter of 12/06/19    Adult Transthoracic Echo Complete W/ Cont if Necessary Per Protocol    Interpretation Summary  · Estimated EF = 60%.  · The cardiac valves are anatomically and functionally normal.      Current medications:  Scheduled Meds:aspirin, 325 mg, Oral, Daily  atorvastatin, 20 mg, Oral, Nightly  chlorthalidone, 12.5 mg, Oral, Daily  doxycycline, 100 mg, Oral, BID  heparin (porcine), 5,000 Units, Subcutaneous, Q8H  ipratropium, 2 spray, Each Nare, Q12H  ipratropium-albuterol, 3 mL, Nebulization, Q6H - RT  lisinopril, 10 mg, Oral, Daily  methylPREDNISolone sodium succinate, 40 mg, Intravenous, Q8H  nebivolol, 10 mg, Oral, Daily  pantoprazole, 40 mg, Oral, Q AM  sodium chloride, 10 mL, Intravenous, Q12H      Continuous Infusions:   PRN Meds:.  acetaminophen    ipratropium-albuterol    sodium chloride    sodium chloride    sodium chloride    Assessment & Plan   Assessment & Plan     Active Hospital Problems    Diagnosis  POA    **COPD exacerbation [J44.1]  Yes    Acute respiratory  failure with hypoxia [J96.01]  Yes    GERD [K21.9]  Yes    PAD (peripheral artery disease) [I73.9]  Yes    Essential hypertension [I10]  Yes    Mixed hyperlipidemia [E78.2]  Yes    CAD (coronary artery disease) [I25.10]  Yes      Resolved Hospital Problems   No resolved problems to display.        Brief Hospital Course to date:  Uzair Jacob is a 78 y.o. male  with PMH significant for asthma, COPD, CAD, HLD, HTN, PAD, who presented to the ED with complaint of shortness of breath and was found to have acute hypoxic respiratory failure secondary to COPD exacerbation.      Acute hypoxic respiratory failure  COPD exacerbation  - Has been on long steroid taper outpatient per pulmonologist  - Wean supplemental oxygen  - DuoNebs scheduled and as needed  - transition to PO steroids as lungs are clear now  - Doxycycline twice daily  - CBC, BMP in the a.m.  -- CTA chest as noted below     Acute DVT  - Previous history of DVT  - Venous duplex prelim with DVT  -- get CTA chest to rule out PE given hypoxia  -- start Eliquis. Will need lifelong anticoagulation as this is his second event     Elevated creatinine  - Baseline 1.06, was at 1.33 on admission   - BMP in the a.m.     Hypertension  Dyslipidemia  CAD  PAD  - Continue daily ASA  - Continue Lipitor  - Continue chlorthalidone, Bystolic, lisinopril     GERD  - Continue PPI    Total time spent: Time Spent: Time Spent: 25 minutes  Time spent includes time reviewing chart, face-to-face time, counseling patient/family/caregiver, ordering medications/tests/procedures, communicating with other health care professionals, documenting clinical information in the electronic health record, and coordination of care.        Expected Discharge Location and Transportation: home  Expected Discharge   Expected Discharge Date: 6/28/2024; Expected Discharge Time:      VTE Prophylaxis:  Pharmacologic VTE prophylaxis orders are present.              CODE STATUS:   Code Status and  Medical Interventions:   Ordered at: 06/26/24 2007     Code Status (Patient has no pulse and is not breathing):    CPR (Attempt to Resuscitate)     Medical Interventions (Patient has pulse or is breathing):    Full Support       Brenda Smart MD  06/27/24

## 2024-06-27 NOTE — CASE MANAGEMENT/SOCIAL WORK
Discharge Planning Assessment  [facility]     Patient Name: Uzair Jacob  MRN: [id number]  Today's Date: 6/27/2024    Admit Date: 6/26/2024    Plan: Home   Discharge Needs Assessment       Row Name 06/27/24 1711       Living Environment    People in Home alone    Current Living Arrangements home    Primary Care Provided by self    Provides Primary Care For no one    Family Caregiver if Needed child(maya), adult    Family Caregiver Names Minoo Alejo- dorothea    Quality of Family Relationships helpful;supportive    Able to Return to Prior Arrangements yes       Transition Planning    Patient/Family Anticipates Transition to home    Patient/Family Anticipated Services at Transition none    Transportation Anticipated family or friend will provide       Discharge Needs Assessment    Readmission Within the Last 30 Days no previous admission in last 30 days    Equipment Currently Used at Home none    Concerns to be Addressed discharge planning    Equipment Needed After Discharge oxygen    Current Discharge Risk chronically ill;lives alone                   Discharge Plan       Row Name 06/27/24 1712       Plan    Plan Home    Patient/Family in Agreement with Plan yes    Plan Comments I have met with Mr. Jacob at the bedside to initiate discharge planning.  He is sitting up in chair.  He states that he lives alone in his own home in [place].  He reports that he is independent with activities of daily living and mobility.  He denies use of any DME and current receipt of home helath/outpatient services.  I have confirmed that his PCP is David Easley MD and his insurance is [employer].  He denies difficulty affording/obtaining prescription medications.  I have ordered home O2 setup with [facility].  A portable O2 tank has been delivered to bedside.  Mr. Jacob plans to return home at discharge and states that his daughter is available to provide assistance and transportation as needed.  He denies  additional discharge needs.    Final Discharge Disposition Code 01 - home or self-care                  Continued Care and Services - Admitted Since 6/26/2024       Durable Medical Equipment       Service Provider Request Status Selected Services Address Phone Fax Patient Preferred    [facility] - [facility] [address], [facility] [address] [phone] --                  Expected Discharge Date and Time       Expected Discharge Date Expected Discharge Time    Jun 28, 2024            Demographic Summary       Row Name 06/27/24 1710       General Information    Admission Type observation    Arrived From home    Referral Source admission list    Reason for Consult discharge planning       Contact Information    Permission Granted to Share Info With                    Functional Status       Row Name 06/27/24 1710       Functional Status, IADL    Medications independent    Meal Preparation independent    Housekeeping independent    Laundry independent    Shopping independent       Employment/    Employment Status retired                   Psychosocial    No documentation.                  Abuse/Neglect    No documentation.                  Legal    No documentation.                  Substance Abuse    No documentation.                  Patient Forms    No documentation.                     Rdaha Kevin, RN

## 2024-06-28 ENCOUNTER — READMISSION MANAGEMENT (OUTPATIENT)
Dept: CALL CENTER | Facility: HOSPITAL | Age: 79
End: 2024-06-28
Payer: MEDICARE

## 2024-06-28 VITALS
SYSTOLIC BLOOD PRESSURE: 143 MMHG | HEIGHT: 67 IN | DIASTOLIC BLOOD PRESSURE: 95 MMHG | OXYGEN SATURATION: 89 % | HEART RATE: 100 BPM | WEIGHT: 155 LBS | RESPIRATION RATE: 18 BRPM | TEMPERATURE: 98 F | BODY MASS INDEX: 24.33 KG/M2

## 2024-06-28 DIAGNOSIS — J45.909 IDIOPATHIC ASTHMA: Primary | ICD-10-CM

## 2024-06-28 PROBLEM — I82.4Y1 ACUTE DEEP VEIN THROMBOSIS (DVT) OF PROXIMAL VEIN OF RIGHT LOWER EXTREMITY: Status: ACTIVE | Noted: 2024-06-28

## 2024-06-28 LAB
ANION GAP SERPL CALCULATED.3IONS-SCNC: 4 MMOL/L (ref 5–15)
BASOPHILS # BLD AUTO: 0.01 10*3/MM3 (ref 0–0.2)
BASOPHILS NFR BLD AUTO: 0.1 % (ref 0–1.5)
BUN SERPL-MCNC: 40 MG/DL (ref 8–23)
BUN/CREAT SERPL: 39.6 (ref 7–25)
CALCIUM SPEC-SCNC: 8.9 MG/DL (ref 8.6–10.5)
CHLORIDE SERPL-SCNC: 101 MMOL/L (ref 98–107)
CO2 SERPL-SCNC: 35 MMOL/L (ref 22–29)
CREAT SERPL-MCNC: 1.01 MG/DL (ref 0.76–1.27)
DEPRECATED RDW RBC AUTO: 46.5 FL (ref 37–54)
EGFRCR SERPLBLD CKD-EPI 2021: 76.1 ML/MIN/1.73
EOSINOPHIL # BLD AUTO: 0.01 10*3/MM3 (ref 0–0.4)
EOSINOPHIL NFR BLD AUTO: 0.1 % (ref 0.3–6.2)
ERYTHROCYTE [DISTWIDTH] IN BLOOD BY AUTOMATED COUNT: 14.1 % (ref 12.3–15.4)
GLUCOSE SERPL-MCNC: 118 MG/DL (ref 65–99)
HCT VFR BLD AUTO: 44 % (ref 37.5–51)
HGB BLD-MCNC: 14.2 G/DL (ref 13–17.7)
IMM GRANULOCYTES # BLD AUTO: 0.07 10*3/MM3 (ref 0–0.05)
IMM GRANULOCYTES NFR BLD AUTO: 1 % (ref 0–0.5)
LYMPHOCYTES # BLD AUTO: 0.82 10*3/MM3 (ref 0.7–3.1)
LYMPHOCYTES NFR BLD AUTO: 12.2 % (ref 19.6–45.3)
MCH RBC QN AUTO: 28.8 PG (ref 26.6–33)
MCHC RBC AUTO-ENTMCNC: 32.3 G/DL (ref 31.5–35.7)
MCV RBC AUTO: 89.2 FL (ref 79–97)
MONOCYTES # BLD AUTO: 0.67 10*3/MM3 (ref 0.1–0.9)
MONOCYTES NFR BLD AUTO: 9.9 % (ref 5–12)
NEUTROPHILS NFR BLD AUTO: 5.16 10*3/MM3 (ref 1.7–7)
NEUTROPHILS NFR BLD AUTO: 76.7 % (ref 42.7–76)
NRBC BLD AUTO-RTO: 0 /100 WBC (ref 0–0.2)
PLATELET # BLD AUTO: 224 10*3/MM3 (ref 140–450)
PMV BLD AUTO: 9.9 FL (ref 6–12)
POTASSIUM SERPL-SCNC: 4 MMOL/L (ref 3.5–5.2)
QT INTERVAL: 416 MS
QTC INTERVAL: 461 MS
RBC # BLD AUTO: 4.93 10*6/MM3 (ref 4.14–5.8)
SODIUM SERPL-SCNC: 140 MMOL/L (ref 136–145)
WBC NRBC COR # BLD AUTO: 6.74 10*3/MM3 (ref 3.4–10.8)

## 2024-06-28 PROCEDURE — 80048 BASIC METABOLIC PNL TOTAL CA: CPT | Performed by: INTERNAL MEDICINE

## 2024-06-28 PROCEDURE — 85025 COMPLETE CBC W/AUTO DIFF WBC: CPT | Performed by: INTERNAL MEDICINE

## 2024-06-28 PROCEDURE — 99238 HOSP IP/OBS DSCHRG MGMT 30/<: CPT | Performed by: INTERNAL MEDICINE

## 2024-06-28 PROCEDURE — 94799 UNLISTED PULMONARY SVC/PX: CPT

## 2024-06-28 PROCEDURE — G0378 HOSPITAL OBSERVATION PER HR: HCPCS

## 2024-06-28 PROCEDURE — 63710000001 PREDNISONE PER 1 MG: Performed by: INTERNAL MEDICINE

## 2024-06-28 RX ORDER — ASPIRIN 81 MG/1
81 TABLET ORAL DAILY
Status: DISCONTINUED | OUTPATIENT
Start: 2024-06-29 | End: 2024-06-28 | Stop reason: HOSPADM

## 2024-06-28 RX ORDER — ASPIRIN 81 MG/1
81 TABLET ORAL DAILY
Qty: 30 TABLET | Refills: 0 | Status: SHIPPED | OUTPATIENT
Start: 2024-06-29

## 2024-06-28 RX ORDER — DOXYCYCLINE 100 MG/1
100 CAPSULE ORAL 2 TIMES DAILY
Qty: 10 CAPSULE | Refills: 0 | Status: SHIPPED | OUTPATIENT
Start: 2024-06-28 | End: 2024-07-01

## 2024-06-28 RX ADMIN — NEBIVOLOL 10 MG: 10 TABLET ORAL at 09:25

## 2024-06-28 RX ADMIN — ASPIRIN 325 MG: 325 TABLET ORAL at 08:38

## 2024-06-28 RX ADMIN — CHLORTHALIDONE 12.5 MG: 25 TABLET ORAL at 09:25

## 2024-06-28 RX ADMIN — PREDNISONE 40 MG: 20 TABLET ORAL at 08:38

## 2024-06-28 RX ADMIN — PANTOPRAZOLE SODIUM 40 MG: 40 TABLET, DELAYED RELEASE ORAL at 05:37

## 2024-06-28 RX ADMIN — APIXABAN 10 MG: 5 TABLET, FILM COATED ORAL at 08:38

## 2024-06-28 RX ADMIN — IPRATROPIUM BROMIDE 2 SPRAY: 21 SPRAY, METERED NASAL at 09:25

## 2024-06-28 RX ADMIN — IPRATROPIUM BROMIDE AND ALBUTEROL SULFATE 3 ML: 2.5; .5 SOLUTION RESPIRATORY (INHALATION) at 00:39

## 2024-06-28 RX ADMIN — IPRATROPIUM BROMIDE AND ALBUTEROL SULFATE 3 ML: 2.5; .5 SOLUTION RESPIRATORY (INHALATION) at 07:03

## 2024-06-28 RX ADMIN — DOXYCYCLINE 100 MG: 100 CAPSULE ORAL at 08:38

## 2024-06-28 RX ADMIN — Medication 10 ML: at 08:39

## 2024-06-28 RX ADMIN — LISINOPRIL 10 MG: 10 TABLET ORAL at 08:38

## 2024-06-28 NOTE — CASE MANAGEMENT/SOCIAL WORK
Case Management Discharge Note      Final Note: Per medical team, pt is medically ready for discharge today. I met with pt at bedside and his discharge plan is home with daughter, Cheryl to transport. Pt is requiring home O2 arranged and home arranged through [facility]. [employer] [facility] has delivered a portable O2 tank to   patient's room for transportation home.  Once home, [facility] will set up home O2. Pt denies any other discharge needs         Selected Continued Care - Discharged on 6/28/2024 Admission date: 6/26/2024 - Discharge disposition: Home or Self Care      Destination    No services have been selected for the patient.                Durable Medical Equipment       Service Provider Selected Services Address Phone Fax Patient Preferred    [facility] - [facility] [address] , [facility] [address] [phone] --              Dialysis/Infusion    No services have been selected for the patient.                Home Medical Care    No services have been selected for the patient.                Therapy    No services have been selected for the patient.                Community Resources    No services have been selected for the patient.                Community & DME    No services have been selected for the patient.                         Final Discharge Disposition Code: 01 - home or self-care

## 2024-06-28 NOTE — PROGRESS NOTES
Patient has been initiated on Apixaban (Eliquis) during admission. Education and dosing schedule (when to transition from 10 mg BID to 5 mg BID) provided on 6/28/2024 verbally and in writing. Information provided includes effects of medication, drug-drug and drug-food interactions, and signs/symptoms of bleeding and clotting. Patient/family verbalized understanding through teach back. All pertinent questions were answered by pharmacist.    Royce Solitario PharmD  6/28/2024  11:00 EDT

## 2024-06-28 NOTE — DISCHARGE SUMMARY
[facility] Medicine Services  DISCHARGE SUMMARY    Patient Name: Uzair Jacob  : 1945  MRN: [id number]    Date of Admission: 2024  5:37 PM  Date of Discharge:  2024  Primary Care Physician: Simon Easley MD    Consults       No orders found from 2024 to 2024.            Hospital Course     Presenting Problem: COPD exacerbation/ RLE swelling    Active Hospital Problems    Diagnosis  POA    **COPD exacerbation [J44.1]  Yes    Acute deep vein thrombosis (DVT) of proximal vein of right lower extremity [I82.4Y1]  Unknown     Priority: High    Acute respiratory failure with hypoxia [J96.01]  Yes    GERD [K21.9]  Yes    PAD (peripheral artery disease) [I73.9]  Yes    Essential hypertension [I10]  Yes    Mixed hyperlipidemia [E78.2]  Yes    CAD (coronary artery disease) [I25.10]  Yes      Resolved Hospital Problems   No resolved problems to display.          Hospital Course:  Uzair Jacob is a 78 y.o. male with PMH significant for asthma, COPD, CAD, HLD, HTN, PAD, who presented to the ED with complaint of shortness of breath and was found to have acute hypoxic respiratory failure secondary to COPD exacerbation.      Acute hypoxic respiratory failure  COPD exacerbation  - Has been on long steroid taper outpatient per pulmonologist  - Wean supplemental oxygen  - DuoNebs scheduled and as needed  - transitioned to PO steroids as lungs are clear now.  Of note, pt is on his second week of prednisone 40 mg daily. As noted below in the med rec, Pulm had recommended tapering by 10 mg every two weeks, so next Friday, 24, he will taper to 30 mg daily as per previous taper  - continue Doxycycline twice daily  -- CTA chest as noted below- needs repeat CT in the future to ensure clearance of findings.      Acute RLE DVT  - Previous history of DVT  - Venous duplex with DVT involving proximal and distal veins of RLE  -- CTA chest was negative for PE  --  started Eliquis. Will need lifelong anticoagulation as this is his second event     Elevated creatinine  - Baseline 1.06, was at 1.33 on admission      Hypertension  Dyslipidemia  CAD  PAD  - Continue daily ASA but decrease from 325 mg to 81 mg daily due to addition of Eliquis  - Continue Lipitor  - Continue chlorthalidone, Bystolic, lisinopril     GERD  - Continue PPI      Discharge Follow Up Recommendations for outpatient labs/diagnostics:  Follow up with PCP within one week  Follow up with Pulmonary previously arranged  Recommend repeat CT chest in about a month to ensure resolution of CTA chest findings as noted below     Day of Discharge     HPI:   Doing well this am, feels ready to go home.  Daughter at bedside.     Review of Systems  Gen- No fevers, chills  CV- No chest pain, palpitations  Resp- No cough, dyspnea  GI- No N/V/D, abd pain      Vital Signs:   Temp:  [96.4 °F (35.8 °C)-98.2 °F (36.8 °C)] 98 °F (36.7 °C)  Heart Rate:  [58-94] 94  Resp:  [16-18] 18  BP: (106-140)/(64-94) 132/84  Flow (L/min):  [2] 2      Physical Exam:  Constitutional: No acute distress, awake, alert  HENT: NCAT, mucous membranes moist  Respiratory: Clear to auscultation bilaterally, respiratory effort normal   Cardiovascular: RRR, no murmurs, rubs, or gallops  Gastrointestinal: Positive bowel sounds, soft, nontender, nondistended  Musculoskeletal: No L ankle edema, RLE with edema   Psychiatric: Appropriate affect, cooperative  Neurologic: Oriented x 3, strength symmetric in all extremities, Cranial Nerves grossly intact to confrontation, speech clear  Skin: No rashes     Pertinent  and/or Most Recent Results     LAB RESULTS:      Lab 06/28/24  0522 06/27/24  0013 06/26/24  1632   WBC 6.74 7.30 10.07   HEMOGLOBIN 14.2 14.6 15.3   HEMATOCRIT 44.0 44.1 46.3   PLATELETS 224 202 219   NEUTROS ABS 5.16 6.85 8.88*   IMMATURE GRANS (ABS) 0.07* 0.05 0.08*   LYMPHS ABS 0.82 0.27* 0.44*   MONOS ABS 0.67 0.11 0.63   EOS ABS 0.01 0.00 0.00    MCV 89.2 87.3 87.0         Lab 06/28/24  0522 06/27/24  0013 06/26/24 1940 06/26/24  1632   SODIUM 140 137  --  137   POTASSIUM 4.0 4.4  --  4.5   CHLORIDE 101 96*  --  96*   CO2 35.0* 30.0*  --  32.0*   ANION GAP 4.0* 11.0  --  9.0   BUN 40* 46*  --  36*   CREATININE 1.01 1.36*  --  1.33*   EGFR 76.1 53.3*  --  54.7*   GLUCOSE 118* 286*  --  173*   CALCIUM 8.9 8.3*  --  8.7   MAGNESIUM  --  1.8 1.7  --          Lab 06/26/24  1632   TOTAL PROTEIN 6.7   ALBUMIN 3.0*   GLOBULIN 3.7   ALT (SGPT) 28   AST (SGOT) 23   BILIRUBIN 1.5*   ALK PHOS 59         Lab 06/27/24  0013 06/26/24 1940 06/26/24  1632   PROBNP  --   --  600.4   HSTROP T 29* 41* 37*                 Lab 06/26/24  1807   PH, ARTERIAL 7.457*   PCO2, ARTERIAL 43.1   PO2 .0*   FIO2 40   HCO3 ART 30.4*   BASE EXCESS ART 5.7*   CARBOXYHEMOGLOBIN 1.4     Brief Urine Lab Results  (Last result in the past 365 days)        Color   Clarity   Blood   Leuk Est   Nitrite   Protein   CREAT   Urine HCG        11/15/23 0840             156.1               Microbiology Results (last 10 days)       Procedure Component Value - Date/Time    Respiratory Panel PCR w/COVID-19(SARS-CoV-2) [facility]/RUI/PAD/COR/REINIER In-House, NP Swab in UTM/VTM, 2 HR TAT - Swab, Nasopharynx [[id number]]  (Normal) Collected: 06/26/24 1652    Lab Status: Final result Specimen: Swab from Nasopharynx Updated: 06/26/24 4893     ADENOVIRUS, PCR Not Detected     Coronavirus 229E Not Detected     Coronavirus HKU1 Not Detected     Coronavirus NL63 Not Detected     Coronavirus OC43 Not Detected     COVID19 Not Detected     Human Metapneumovirus Not Detected     Human Rhinovirus/Enterovirus Not Detected     Influenza A PCR Not Detected     Influenza B PCR Not Detected     Parainfluenza Virus 1 Not Detected     Parainfluenza Virus 2 Not Detected     Parainfluenza Virus 3 Not Detected     Parainfluenza Virus 4 Not Detected     RSV, PCR Not Detected     Bordetella pertussis pcr Not Detected     Bordetella  parapertussis PCR Not Detected     Chlamydophila pneumoniae PCR Not Detected     Mycoplasma pneumo by PCR Not Detected    Narrative:      In the setting of a positive respiratory panel with a viral infection PLUS a negative procalcitonin without other underlying concern for bacterial infection, consider observing off antibiotics or discontinuation of antibiotics and continue supportive care. If the respiratory panel is positive for atypical bacterial infection (Bordetella pertussis, Chlamydophila pneumoniae, or Mycoplasma pneumoniae), consider antibiotic de-escalation to target atypical bacterial infection.            CT Angiogram Chest    Result Date: 6/27/2024  CT ANGIOGRAM CHEST Date of Exam: 6/27/2024 12:29 PM EDT Indication: DVT and hypoxia. Comparison: None available. Technique: CTA of the chest was performed after the uneventful intravenous administration of 60 mL Isovue-370. Reconstructed coronal and sagittal images were also obtained. In addition, a 3-D volume rendered image was created for interpretation. Automated exposure control and iterative reconstruction methods were used. FINDINGS: Thoracic inlet: Unremarkable. Pulmonary arteries: No filling defects are identified within the pulmonary arteries to suggest acute pulmonary embolism. Great vessels: Atherosclerotic plaque is seen within the thoracic aorta and proximal arch vessels. Mediastinum/Beatrice: No pathologically enlarged mediastinal lymph nodes are seen. The esophagus is unremarkable. Lung parenchyma: There is tree-in-bud/centrilobular micronodularity and faint groundglass attenuation throughout both lungs, concerning for atypical infectious process. Right upper lobe granuloma is seen. Trachea and airways: Bronchial wall thickening. Otherwise, the central airways appear unremarkable. Pleural space: No significant pleural effusion or pneumothorax. Heart and pericardium: Coronary artery calcifications. Otherwise, the heart and pericardium appear  unremarkable. Chest wall: No acute or suspicious osseous or soft tissue lesion is identified. Upper abdomen: No acute abnormality is identified within the visualized upper abdomen.     1.Tree-in-bud/centrilobular micronodularity and faint groundglass attenuation throughout both lungs, concerning for atypical infectious process. Recommend short interval CT follow-up to ensure resolution. 2.No evidence of acute pulmonary embolism. 3.Please see above for additional details. Electronically Signed: David Gonzalez MD  6/27/2024 12:45 PM EDT  Workstation ID: [id number]    Duplex Venous Lower Extremity - Right CAR    Result Date: 6/27/2024    Acute right lower extremity deep vein thrombosis noted in the proximal femoral, mid femoral, distal femoral, popliteal, posterior tibial, peroneal and gastrocnemius.   All other right sided veins appeared normal.     XR Chest 1 View    Result Date: 6/26/2024  XR CHEST 1 VW Date of Exam: 6/26/2024 4:59 PM EDT Indication: SOA triage protocol Comparison: 3/31/2022 Findings: Lungs demonstrate coarsened interstitial markings with bibasilar atelectasis or scarring. Probable granuloma within the right midlung, similar since 3/31/2022. No pleural effusion or pneumothorax is identified. The cardiomediastinal silhouette and pulmonary vasculature appear within normal limits. No acute or suspicious osseous lesion is identified.     Impression: 1.Coarsened pulmonary interstitial markings may indicate underlying interstitial lung disease. 2.No significant change since 3/31/2022 Electronically Signed: David Gonzalez MD  6/26/2024 5:38 PM EDT  Workstation ID: [id number]     Results for orders placed during the hospital encounter of 06/26/24    Duplex Venous Lower Extremity - Right CAR    Interpretation Summary    Acute right lower extremity deep vein thrombosis noted in the proximal femoral, mid femoral, distal femoral, popliteal, posterior tibial, peroneal and gastrocnemius.    All other right sided  veins appeared normal.      Results for orders placed during the hospital encounter of 06/26/24    Duplex Venous Lower Extremity - Right CAR    Interpretation Summary    Acute right lower extremity deep vein thrombosis noted in the proximal femoral, mid femoral, distal femoral, popliteal, posterior tibial, peroneal and gastrocnemius.    All other right sided veins appeared normal.      Results for orders placed during the hospital encounter of 12/06/19    Adult Transthoracic Echo Complete W/ Cont if Necessary Per Protocol    Interpretation Summary  · Estimated EF = 60%.  · The cardiac valves are anatomically and functionally normal.      Plan for Follow-up of Pending Labs/Results:     Discharge Details        Discharge Medications        New Medications        Instructions Start Date   apixaban 5 MG tablet tablet  Commonly known as: ELIQUIS   Take 2 tablets by mouth Every 12 (Twelve) Hours for 6 days, THEN 1 tablet Every 12 (Twelve) Hours for 23 days. Indications: DVT/PE (active thrombosis)   Start Date: June 28, 2024     aspirin 81 MG EC tablet  Replaces: aspirin 325 MG tablet   81 mg, Oral, Daily   Start Date: June 29, 2024     doxycycline 100 MG capsule  Commonly known as: MONODOX   100 mg, Oral, 2 Times Daily             Continue These Medications        Instructions Start Date   albuterol sulfate  (90 Base) MCG/ACT inhaler  Commonly known as: Ventolin HFA   2 puffs, Inhalation, Every 4 Hours PRN      atorvastatin 20 MG tablet  Commonly known as: LIPITOR   20 mg, Oral, Every Night at Bedtime      Breztri Aerosphere 160-9-4.8 MCG/ACT aerosol inhaler  Generic drug: Budeson-Glycopyrrol-Formoterol   2 puffs, Inhalation, 2 Times Daily      chlorthalidone 25 MG tablet  Commonly known as: HYGROTON   12.5 mg, Oral, Daily      esomeprazole 20 MG capsule  Commonly known as: nexIUM   20 mg, Oral, Every Morning Before Breakfast      ipratropium 0.03 % nasal spray  Commonly known as: ATROVENT   2 sprays, Nasal, Every  12 Hours      lisinopril 10 MG tablet  Commonly known as: PRINIVIL,ZESTRIL   TAKE 1 TABLET BY MOUTH EVERY DAY      nebivolol 10 MG tablet  Commonly known as: BYSTOLIC   TAKE 1 TABLET BY MOUTH EVERY DAY      predniSONE 10 MG tablet  Commonly known as: DELTASONE   Take 4 tabs daily x 2 weeks, then take 3 tabs daily x 2 weeks, then take 2 tabs daily x 2 weeks, then take 1 tab daily  until return to the office      Triamcinolone Acetonide 55 MCG/ACT nasal inhaler  Commonly known as: NASACORT   2 sprays, Nasal, 2 Times Daily             Stop These Medications      aspirin 325 MG tablet  Replaced by: aspirin 81 MG EC tablet              No Known Allergies      Discharge Disposition:      Diet:  Hospital:  Diet Order   Procedures    Diet: Cardiac, Diabetic; Healthy Heart (2-3 Na+); Consistent Carbohydrate; Fluid Consistency: Thin (IDDSI 0)            Activity:      Restrictions or Other Recommendations:         CODE STATUS:    Code Status and Medical Interventions:   Ordered at: 06/26/24 2007     Code Status (Patient has no pulse and is not breathing):    CPR (Attempt to Resuscitate)     Medical Interventions (Patient has pulse or is breathing):    Full Support       Future Appointments   Date Time Provider Department Center   8/21/2024 10:30 AM [facility], [facility] LAB 1 [facility]   8/21/2024 11:15 AM Jaspreet Quiroz MD [facility]   12/18/2024  8:45 AM Simon Easley MD [facility]   1/29/2025 11:15 AM Vic Blas MD [facility]                 Brenda Smart MD  06/28/24      Time Spent on Discharge:  I spent  35 minutes on this discharge activity which included: face-to-face encounter with the patient, reviewing the data in the system, coordination of the care with the nursing staff as well as consultants, documentation, and entering orders.

## 2024-06-28 NOTE — OUTREACH NOTE
Prep Survey      Flowsheet Row Responses   [facility] patient discharged from? [place]   Is LACE score < 7 ? Yes   Eligibility [facility]   Date of Admission 06/26/24   Date of Discharge 06/28/24   Discharge Disposition Home or Self Care   Discharge diagnosis COPD exacerbation   Does the patient have one of the following disease processes/diagnoses(primary or secondary)? COPD   Does the patient have Home health ordered? No   Is there a DME ordered? Yes   What DME was ordered? [employer] for home O2   Prep survey completed? Yes            Megan SAMUELS - Registered Nurse

## 2024-06-29 ENCOUNTER — TRANSITIONAL CARE MANAGEMENT TELEPHONE ENCOUNTER (OUTPATIENT)
Dept: CALL CENTER | Facility: HOSPITAL | Age: 79
End: 2024-06-29
Payer: MEDICARE

## 2024-06-29 NOTE — OUTREACH NOTE
Call Center TCM Note      Flowsheet Row Responses   [facility] patient discharged from? [place]   Does the patient have one of the following disease processes/diagnoses(primary or secondary)? COPD   TCM attempt successful? Yes   Call start time 1605   Call end time 1607   Discharge diagnosis COPD exacerbation   Meds reviewed with patient/caregiver? Yes   Is the patient having any side effects they believe may be caused by any medication additions or changes? No   Does the patient have all medications ordered at discharge? Yes   Is the patient taking all medications as directed (includes completed medication regime)? Yes   Comments Pt has TCM PCP protocol hospital f/u appt with Dr Easley on 7/1/24   Does the patient have an appointment with their PCP within 7-14 days of discharge? Yes   Has home health visited the patient within 72 hours of discharge? N/A   Psychosocial issues? No   Did the patient receive a copy of their discharge instructions? Yes   Nursing interventions Reviewed instructions with patient   What is the patient's perception of their health status since discharge? Improving   Nursing Interventions Nurse provided patient education   Is the patient/caregiver able to teach back the hierarchy of who to call/visit for symptoms/problems? PCP, Specialist, Home health nurse, Urgent Care, ED, 911 Yes   Additional teach back comments states breathing comfortably with O2   Is the patient able to teach back COPD zones? Yes   Patient reports what zone on this call? Green Zone   Green Zone Reports doing well, Breathing without shortness of breath, Usual activity and exercise level, Usual amounts of cough and phlegm/mucous, Slept well last night, Appetite is good   Green Zone interventions: Take daily medications, Use oxygen as prescribed, Continue regular exercise/diet plan   TCM call completed? Yes   Call end time 1607   Would this patient benefit from a Referral to [facility] Social Work? No   Is the  patient interested in additional calls from an ambulatory ? No            Megan Mitchell RN    6/29/2024, 16:09 EDT

## 2024-07-01 ENCOUNTER — OFFICE VISIT (OUTPATIENT)
Dept: INTERNAL MEDICINE | Facility: CLINIC | Age: 79
End: 2024-07-01
Payer: MEDICARE

## 2024-07-01 VITALS
OXYGEN SATURATION: 92 % | TEMPERATURE: 99.9 F | BODY MASS INDEX: 23.86 KG/M2 | WEIGHT: 152 LBS | SYSTOLIC BLOOD PRESSURE: 128 MMHG | HEIGHT: 67 IN | HEART RATE: 85 BPM | DIASTOLIC BLOOD PRESSURE: 70 MMHG

## 2024-07-01 DIAGNOSIS — J96.01 ACUTE RESPIRATORY FAILURE WITH HYPOXIA: Primary | ICD-10-CM

## 2024-07-01 DIAGNOSIS — I82.4Y1 ACUTE DEEP VEIN THROMBOSIS (DVT) OF PROXIMAL VEIN OF RIGHT LOWER EXTREMITY: ICD-10-CM

## 2024-07-01 DIAGNOSIS — J41.0 SIMPLE CHRONIC BRONCHITIS: Primary | ICD-10-CM

## 2024-07-01 PROCEDURE — 1160F RVW MEDS BY RX/DR IN RCRD: CPT | Performed by: INTERNAL MEDICINE

## 2024-07-01 PROCEDURE — 1111F DSCHRG MED/CURRENT MED MERGE: CPT | Performed by: INTERNAL MEDICINE

## 2024-07-01 PROCEDURE — 1126F AMNT PAIN NOTED NONE PRSNT: CPT | Performed by: INTERNAL MEDICINE

## 2024-07-01 PROCEDURE — 1159F MED LIST DOCD IN RCRD: CPT | Performed by: INTERNAL MEDICINE

## 2024-07-01 PROCEDURE — 99495 TRANSJ CARE MGMT MOD F2F 14D: CPT | Performed by: INTERNAL MEDICINE

## 2024-07-01 PROCEDURE — 3074F SYST BP LT 130 MM HG: CPT | Performed by: INTERNAL MEDICINE

## 2024-07-01 PROCEDURE — 3078F DIAST BP <80 MM HG: CPT | Performed by: INTERNAL MEDICINE

## 2024-07-01 RX ORDER — IPRATROPIUM BROMIDE AND ALBUTEROL SULFATE 2.5; .5 MG/3ML; MG/3ML
3 SOLUTION RESPIRATORY (INHALATION) EVERY 4 HOURS PRN
Qty: 360 ML | Refills: 1 | Status: SHIPPED | OUTPATIENT
Start: 2024-07-01

## 2024-07-01 NOTE — PROGRESS NOTES
Transitional Care Follow Up Visit  Subjective     Uzair Jacob is a 78 y.o. male who presents for a transitional care management visit.    Within 48 business hours after discharge our office contacted him via telephone to coordinate his care and needs.      I reviewed and discussed the details of that call along with the discharge summary, hospital problems, inpatient lab results, inpatient diagnostic studies, and consultation reports with Uzair.     Current outpatient and discharge medications have been reconciled for the patient.  Reviewed by: Simon Easley MD          6/28/2024     6:29 PM   Date of TCM Phone Call   [facility]   Date of Admission 6/26/2024   Date of Discharge 6/28/2024   Discharge Disposition Home or Self Care     Risk for Readmission (LACE) Score: 5 (6/28/2024  6:01 AM)      History of Present Illness  The patient is a 78-year-old male who was admitted to the hospital on 06/26/2023 with a COPD exacerbation. He was incidentally found to have right lower extremity DVT. He was initially given IV, then p.o. steroids and placed on doxycycline. CT angiogram did show some evidence of infiltrate, but he has been followed for quite some time for chronic infiltrates by pulmonary. He was discharged on 06/28/2023.    The patient reports a general feeling of malaise, with a recorded oxygen saturation of 94 to 95 percent. He is currently on a regimen of prednisone, administered once daily. His leg condition is improving. He has not utilized Mucinex or guaifenesin for an extended period. During his hospital stay, he was utilizing a nebulizer, which he possesses at home, but possesses medication. He is seeking a prescription for a portable oxygen concentrator. He is scheduled to see Dr. Quiroz in 08/2023. He expresses interest in pulmonary rehabilitation.     Course During Hospital Stay:  see above     The following portions of the patient's history were reviewed and  "updated as appropriate: allergies, current medications, past family history, past medical history, past social history, past surgical history, and problem list.    Vitals:    07/01/24 1024   BP: 128/70   Pulse: 85   Temp: 99.9 °F (37.7 °C)   TempSrc: Temporal   SpO2: 92%   Weight: 68.9 kg (152 lb)   Height: 170.2 cm (67\")     Body mass index is 23.81 kg/m².  BMI is within normal parameters. No other follow-up for BMI required.     Advance Care Planning   ACP discussion was held with the patient during this visit. Patient has an advance directive (not in EMR), copy requested.     Review of Systems   Constitutional:  Positive for fatigue. Negative for chills and fever.   HENT:  Negative for congestion, ear pain and sinus pressure.    Respiratory:  Positive for shortness of breath. Negative for cough, chest tightness and wheezing.    Cardiovascular:  Negative for chest pain and palpitations.   Gastrointestinal:  Negative for abdominal pain, blood in stool and constipation.   Skin:  Negative for color change.   Allergic/Immunologic: Negative for environmental allergies.   Neurological:  Negative for dizziness, speech difficulty and headaches.   Psychiatric/Behavioral:  Negative for confusion. The patient is not nervous/anxious.      Physical Exam       Results  Imaging  CT angiogram did show some evidence of infiltrate.    Patient wellness counseling  Exercise:   Diet:   Screening:  Social History     Socioeconomic History    Marital status:    Tobacco Use    Smoking status: Never     Passive exposure: Never    Smokeless tobacco: Never   Vaping Use    Vaping status: Never Used   Substance and Sexual Activity    Alcohol use: Not Currently     Alcohol/week: 1.0 standard drink of alcohol     Types: 1 Glasses of wine per week    Drug use: Never    Sexual activity: Not Currently     Partners: Female     Birth control/protection: Spermicide, Tubal ligation       Objective   Physical Exam  Vitals reviewed. "   Constitutional:       Appearance: He is well-developed.   HENT:      Head: Normocephalic and atraumatic.   Cardiovascular:      Rate and Rhythm: Normal rate and regular rhythm.      Heart sounds: Normal heart sounds. No murmur heard.  Pulmonary:      Effort: Pulmonary effort is normal.      Breath sounds: Decreased air movement present.   Musculoskeletal:      Right lower leg: Edema present.      Left lower leg: No edema.   Neurological:      Mental Status: He is alert and oriented to person, place, and time.         Assessment & Plan   Assessment & Plan  1. Acute respiratory failure in a patient with known chronic pulmonary infiltrates.  The patient continues to experience significant shortness of breath, albeit not hypoxic. He experienced difficulty in walking down the tee today. The patient will complete his course of doxycycline and continue with oxygen. Guaifenesin has been suggested and his prednisone dosage will be increased to 20 mg daily. A prescription for DuoNeb has been sent to his pharmacy.    2. Acute right lower extremity DVT.  Today's examination revealed 1+ edema in the right lower extremity. No calf tenderness was observed. The patient is advised to continue apixaban.    Follow-up  A follow-up appointment is scheduled for 2 weeks from now.     Problem List Items Addressed This Visit       Acute respiratory failure with hypoxia - Primary    Acute deep vein thrombosis (DVT) of proximal vein of right lower extremity            There are no Patient Instructions on file for this visit.    Simon Easley MD     Patient or patient representative verbalized consent for the use of Ambient Listening during the visit with  Simon Easley MD for chart documentation. 7/8/2024  10:25 EDT

## 2024-07-18 NOTE — PROGRESS NOTES
[facility]     Uzair Jacob  1945   [id number]      Patient Care Team:  Simon Easley MD as PCP - General  Vic Blas MD as Consulting Physician (Cardiology)  Sukhdev Sun MD (Dermatology)  Jaspreet Quiroz MD as Emergency Attending (Pulmonary Disease)  Joey Elizalde MD as Consulting Physician (Ophthalmology)  Jaspreet Quiroz MD as Emergency Attending (Pulmonary Disease)    Chief Complaint::   Chief Complaint   Patient presents with    Acute Respiratory      2 week follow-up        HPI  History of Present Illness  The patient is a 78-year-old male who comes in for follow-up of DVT, acute respiratory failure, and hypertension.    The patient continues to experience weakness and dyspnea, albeit with notable improvement over the past 2 weeks. He recounts an incident last Wednesday where he experienced no difficulties, after which he removed his supplemental oxygen and engaged in household activities. However, the following day, he experienced a resurgence of symptoms. His condition fluctuates, with some days being better than others. He is contemplating reducing his supplemental oxygen intake during periods of well-being. He is inquiring about the necessity of antibiotic therapy, which was prescribed during his hospital stay. He reports expectorating phlegm, but denies any fever. His  inquires about the daily use of Mucinex. During his hospital stay, he was administered prednisone, reducing his dosage to 2 mg since his last visit. He reports feeling unwell during the initial 2 hours of waking in the morning, which improves as the day progresses. He has been attempting deep breathing exercises to facilitate airflow. His  notes that his energy levels are significantly low. His appetite and hydration status are satisfactory.    The patient is experiencing edema in his ankles. He is currently on a 15-day course of Eliquis and is  requesting a refill. He wears compression socks, which he believes are beneficial.      Chronic Conditions:      Patient Active Problem List   Diagnosis    CAD (coronary artery disease)    PAD (peripheral artery disease)    Essential hypertension    Mixed hyperlipidemia    Peripheral vascular disease    Edema of right lower extremity    Annual physical exam    Alcohol use    Preoperative examination    Sinus bradycardia, persistent    Postphlebitic syndrome    GERD    Perennial rhinitis    Non-smoker    Eosinophilic granulomatosis with polyangiitis (EGPA)    Bilateral pulmonary infiltrates    Screening PSA (prostate specific antigen)    Medicare annual wellness visit, subsequent    Chronic persistent asthma    Abnormal glucose    COPD exacerbation    Acute respiratory failure with hypoxia    Acute deep vein thrombosis (DVT) of proximal vein of right lower extremity        Past Medical History:   Diagnosis Date    Allergic     Allergic rhinitis     Asthma 03/08/2017    CAD (coronary artery disease) 03/08/2017    Chest pain     stent to LAD 2012    Chronic persistent asthma 12/04/2020    COPD (chronic obstructive pulmonary disease)     Deep vein thrombosis (DVT) of right lower extremity 04/18/2019    GERD (gastroesophageal reflux disease) 03/08/2017    Hyperlipidemia 03/08/2017    Hypertension 03/08/2017    Nasal polyps     nasal polypectomy 1996    PAD (peripheral artery disease) 03/08/2017    Pneumonia 1950       Past Surgical History:   Procedure Laterality Date    CARDIAC CATHETERIZATION  01/01/2018    CORONARY ANGIOPLASTY WITH STENT PLACEMENT  03/2012    Chest pain; stent to LAD    NASAL POLYP SURGERY  1996    Nasal polyps    POLYPECTOMY      Sinus polyp extraction.    TONSILLECTOMY         Family History   Problem Relation Age of Onset    Esophageal cancer Father         cause of death    Cancer Father     Asthma Sister     Asthma Brother        Social History     Socioeconomic History    Marital status:     Tobacco Use    Smoking status: Never     Passive exposure: Never    Smokeless tobacco: Never   Vaping Use    Vaping status: Never Used   Substance and Sexual Activity    Alcohol use: Not Currently     Alcohol/week: 1.0 standard drink of alcohol     Types: 1 Glasses of wine per week    Drug use: Never    Sexual activity: Not Currently     Partners: Female     Birth control/protection: Spermicide, Tubal ligation       No Known Allergies      Current Outpatient Medications:     albuterol sulfate HFA (Ventolin HFA) 108 (90 Base) MCG/ACT inhaler, Inhale 2 puffs Every 4 (Four) Hours As Needed for Wheezing., Disp: 18 g, Rfl: 11    aspirin 81 MG EC tablet, Take 1 tablet by mouth Daily., Disp: 30 tablet, Rfl: 0    atorvastatin (LIPITOR) 20 MG tablet, Take 1 tablet by mouth every night at bedtime., Disp: 90 tablet, Rfl: 3    Budeson-Glycopyrrol-Formoterol (Breztri Aerosphere) 160-9-4.8 MCG/ACT aerosol inhaler, Inhale 2 puffs 2 (Two) Times a Day., Disp: 3 each, Rfl: 3    chlorthalidone (HYGROTON) 25 MG tablet, Take 0.5 tablets by mouth Daily., Disp: 45 tablet, Rfl: 3    esomeprazole (nexIUM) 20 MG capsule, Take 1 capsule by mouth Every Morning Before Breakfast., Disp: , Rfl:     ipratropium (ATROVENT) 0.03 % nasal spray, 2 sprays into the nostril(s) as directed by provider Every 12 (Twelve) Hours., Disp: 1 each, Rfl: 12    ipratropium-albuterol (DUO-NEB) 0.5-2.5 mg/3 ml nebulizer, Take 3 mL by nebulization Every 4 (Four) Hours As Needed for Wheezing., Disp: 360 mL, Rfl: 1    lisinopril (PRINIVIL,ZESTRIL) 10 MG tablet, TAKE 1 TABLET BY MOUTH EVERY DAY, Disp: 90 tablet, Rfl: 3    nebivolol (BYSTOLIC) 10 MG tablet, TAKE 1 TABLET BY MOUTH EVERY DAY, Disp: 90 tablet, Rfl: 3    predniSONE (DELTASONE) 10 MG tablet, Take 4 tabs daily x 2 weeks, then take 3 tabs daily x 2 weeks, then take 2 tabs daily x 2 weeks, then take 1 tab daily  until return to the office, Disp: 120 tablet, Rfl: 2    Triamcinolone Acetonide (NASACORT) 55  "MCG/ACT nasal inhaler, 2 sprays into the nostril(s) as directed by provider 2 (Two) Times a Day., Disp: , Rfl:     apixaban (ELIQUIS) 5 MG tablet tablet, Take 1 tablet by mouth 2 (Two) Times a Day., Disp: 60 tablet, Rfl: 5    Review of Systems   Constitutional: Negative.    Respiratory: Negative.  Positive for shortness of breath. Negative for chest tightness.    Cardiovascular: Negative.  Negative for chest pain.   Gastrointestinal:  Negative for abdominal pain, blood in stool, constipation and diarrhea.        Vital Signs  Vitals:    07/18/24 1210   BP: 126/62   BP Location: Right arm   Patient Position: Sitting   Cuff Size: Adult   Pulse: 85   Temp: 98.2 °F (36.8 °C)   TempSrc: Infrared   SpO2: 91%   Weight: 69.2 kg (152 lb 9.6 oz)   Height: 170.2 cm (67.01\")   PainSc: 0-No pain       Physical Exam  Constitutional:       General: He is not in acute distress.     Appearance: Normal appearance.   Neck:      Vascular: No carotid bruit.   Cardiovascular:      Rate and Rhythm: Normal rate and regular rhythm.   Pulmonary:      Effort: Pulmonary effort is normal.   Musculoskeletal:      Cervical back: Normal range of motion and neck supple.   Lymphadenopathy:      Cervical: No cervical adenopathy.   Neurological:      Mental Status: He is alert.        Physical Exam  Lungs sound clear. Distant breath sounds present, but clear.    Procedures    ACE III MINI        Results             Assessment/Plan:    Diagnoses and all orders for this visit:    1. COPD exacerbation (Primary)    2. Essential hypertension    3. Acute deep vein thrombosis (DVT) of proximal vein of right lower extremity    Other orders  -     apixaban (ELIQUIS) 5 MG tablet tablet; Take 1 tablet by mouth 2 (Two) Times a Day.  Dispense: 60 tablet; Refill: 5  -     ipratropium-albuterol (DUO-NEB) 0.5-2.5 mg/3 ml nebulizer; Take 3 mL by nebulization Every 4 (Four) Hours As Needed for Wheezing.  Dispense: 360 mL; Refill: 1      Assessment & Plan  1. COPD with " recent acute respiratory failure.  The patient is demonstrating slow, gradual progress, albeit clearly showing improvement from 2 weeks ago. His condition fluctuates, with some days being better than others. However, he continues to produce significant mucus. It is recommended that the patient take two Mucinex tablets twice daily. An attempt will be made to reduce the steroid dosage to 10 mg, alternating with 5 mg every other day. However, should any respiratory difficulties arise, the patient will revert to the 10 mg dosage. No additional antibiotics are deemed necessary at this time.    2. Hypertension.  The patient's blood pressure is well-managed with lisinopril, nebivolol, and chlorthalidone.    3. DVT.  The patient will maintain his Eliquis regimen of 5 mg twice daily indefinitely. He has postphlebitic syndrome from previous DVTs and is wearing compression. The importance of elevation when seated during the day was emphasized.    Follow-up  The patient is scheduled for a follow-up visit as previously scheduled. He is scheduled to see pulmonary in 1 month.      Plan of care reviewed with patient at the conclusion of today's visit. Education was provided regarding diagnosis, management, and any prescribed or recommended OTC medications.Patient verbalizes understanding of and agreement with management plan.     Patient or patient representative verbalized consent for the use of Ambient Listening during the visit with  Simon Easley MD for chart documentation. 7/21/2024  10:08 EDT        Simon Easley MD

## 2024-08-12 RX ORDER — PREDNISONE 10 MG/1
TABLET ORAL
Qty: 120 TABLET | Refills: 2 | OUTPATIENT
Start: 2024-08-12

## 2024-11-12 NOTE — PATIENT INSTRUCTIONS
Advance Directive    Advance directives are legal documents that let you make choices ahead of time about your health care and medical treatment in case you become unable to communicate for yourself. Advance directives are a way for you to make known your wishes to family, friends, and health care providers. This can let others know about your end-of-life care if you become unable to communicate.  Discussing and writing advance directives should happen over time rather than all at once. Advance directives can be changed depending on your situation and what you want, even after you have signed the advance directives.  There are different types of advance directives, such as:  · Medical power of .  · Living will.  · Do not resuscitate (DNR) or do not attempt resuscitation (DNAR) order.  Health care proxy and medical power of   A health care proxy is also called a health care agent. This is a person who is appointed to make medical decisions for you in cases where you are unable to make the decisions yourself. Generally, people choose someone they know well and trust to represent their preferences. Make sure to ask this person for an agreement to act as your proxy. A proxy may have to exercise judgment in the event of a medical decision for which your wishes are not known.  A medical power of  is a legal document that names your health care proxy. Depending on the laws in your state, after the document is written, it may also need to be:  · Signed.  · Notarized.  · Dated.  · Copied.  · Witnessed.  · Incorporated into your medical record.  You may also want to appoint someone to manage your money in a situation in which you are unable to do so. This is called a durable power of  for finances. It is a separate legal document from the durable power of  for health care. You may choose the same person or someone different from your health care proxy to act as your agent in money  Ok to send in something for vaginal itching patient developed while on medication for UTI.  Please advise.   matters.  If you do not appoint a proxy, or if there is a concern that the proxy is not acting in your best interests, a court may appoint a guardian to act on your behalf.  Living will  A living will is a set of instructions that state your wishes about medical care when you cannot express them yourself. Health care providers should keep a copy of your living will in your medical record. You may want to give a copy to family members or friends. To alert caregivers in case of an emergency, you can place a card in your wallet to let them know that you have a living will and where they can find it. A living will is used if you become:  · Terminally ill.  · Disabled.  · Unable to communicate or make decisions.  Items to consider in your living will include:  · To use or not to use life-support equipment, such as dialysis machines and breathing machines (ventilators).  · A DNR or DNAR order. This tells health care providers not to use cardiopulmonary resuscitation (CPR) if breathing or heartbeat stops.  · To use or not to use tube feeding.  · To be given or not to be given food and fluids.  · Comfort (palliative) care when the goal becomes comfort rather than a cure.  · Donation of organs and tissues.  A living will does not give instructions for distributing your money and property if you should pass away.  DNR or DNAR  A DNR or DNAR order is a request not to have CPR in the event that your heart stops beating or you stop breathing. If a DNR or DNAR order has not been made and shared, a health care provider will try to help any patient whose heart has stopped or who has stopped breathing. If you plan to have surgery, talk with your health care provider about how your DNR or DNAR order will be followed if problems occur.  What if I do not have an advance directive?  If you do not have an advance directive, some states assign family decision makers to act on your behalf based on how closely you are related to them. Each  state has its own laws about advance directives. You may want to check with your health care provider, , or state representative about the laws in your state.  Summary  · Advance directives are the legal documents that allow you to make choices ahead of time about your health care and medical treatment in case you become unable to tell others about your care.  · The process of discussing and writing advance directives should happen over time. You can change the advance directives, even after you have signed them.  · Advance directives include DNR or DNAR orders, living pierson, and designating an agent as your medical power of .  This information is not intended to replace advice given to you by your health care provider. Make sure you discuss any questions you have with your health care provider.  Document Released: 2009 Document Revised: 2020 Document Reviewed: 2020  [employer] Patient Education ©  [employer]    Medicare Wellness  Personal Prevention Plan of Service     Date of Office Visit:  2020  Encounter Provider:  Amee Gil PA-C  Place of Service:  [facility]  Patient Name: Uzair Jacob  :  1945    As part of the Medicare Wellness portion of your visit today, we are providing you with this personalized preventive plan of services (PPPS). This plan is based upon recommendations of the [place] Preventive Services Task Force (USPSTF) and the Advisory Committee on Immunization Practices (ACIP).    This lists the preventive care services that should be considered, and provides dates of when you are due. Items listed as completed are up-to-date and do not require any further intervention.    Health Maintenance   Topic Date Due   • HEPATITIS C SCREENING  2017   • Pneumococcal Vaccine 65+ (2 of 2 - PPSV23) 2018   • ANNUAL WELLNESS VISIT  2020   • LIPID PANEL  2020   • TDAP/TD  VACCINES (2 - Td) 06/25/2023   • COLONOSCOPY  03/12/2029   • INFLUENZA VACCINE  Completed   • ZOSTER VACCINE  Completed       Orders Placed This Encounter   Procedures   • Comprehensive Metabolic Panel     Standing Status:   Future     Standing Expiration Date:   11/5/2021   • Lipid Panel     Standing Status:   Future     Standing Expiration Date:   11/5/2021   • MicroAlbumin, Urine, Random - Urine, Clean Catch     Standing Status:   Future     Standing Expiration Date:   11/5/2021   • PSA Screen     Standing Status:   Future     Standing Expiration Date:   11/5/2021   • CBC & Differential     Standing Status:   Future     Standing Expiration Date:   11/5/2021     Order Specific Question:   Manual Differential     Answer:   No       No follow-ups on file.